# Patient Record
Sex: FEMALE | Race: WHITE | NOT HISPANIC OR LATINO | Employment: FULL TIME | URBAN - METROPOLITAN AREA
[De-identification: names, ages, dates, MRNs, and addresses within clinical notes are randomized per-mention and may not be internally consistent; named-entity substitution may affect disease eponyms.]

---

## 2017-01-03 ENCOUNTER — TELEPHONE (OUTPATIENT)
Dept: CARDIOLOGY | Facility: CLINIC | Age: 62
End: 2017-01-03

## 2017-01-03 NOTE — TELEPHONE ENCOUNTER
Informed patient of results of holter monitor.  Advised patient to call our office if she continues to have symptoms.  She voiced understanding.

## 2017-01-03 NOTE — TELEPHONE ENCOUNTER
----- Message from Nila Sheehan MD sent at 12/30/2016  8:51 PM CST -----  Has few skipped beats and short run of Pat if symptoms continue will make a trial low ose b blocker

## 2017-01-04 ENCOUNTER — HOSPITAL ENCOUNTER (EMERGENCY)
Facility: HOSPITAL | Age: 62
Discharge: HOME | End: 2017-01-04
Payer: COMMERCIAL

## 2017-01-04 VITALS
BODY MASS INDEX: 34.93 KG/M2 | HEIGHT: 61 IN | WEIGHT: 185 LBS | SYSTOLIC BLOOD PRESSURE: 153 MMHG | OXYGEN SATURATION: 96 % | DIASTOLIC BLOOD PRESSURE: 89 MMHG | TEMPERATURE: 98 F | RESPIRATION RATE: 20 BRPM | HEART RATE: 94 BPM

## 2017-01-04 DIAGNOSIS — S39.012A LUMBAR STRAIN, INITIAL ENCOUNTER: ICD-10-CM

## 2017-01-04 DIAGNOSIS — S80.10XA CONTUSION OF LOWER LEG, UNSPECIFIED LATERALITY, INITIAL ENCOUNTER: ICD-10-CM

## 2017-01-04 DIAGNOSIS — S16.1XXA CERVICAL STRAIN, ACUTE, INITIAL ENCOUNTER: ICD-10-CM
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
## 2017-01-04 DIAGNOSIS — S62.112A: Primary | ICD-10-CM

## 2017-01-04 PROCEDURE — 29125 APPL SHORT ARM SPLINT STATIC: CPT | Mod: LT

## 2017-01-04 PROCEDURE — 99284 EMERGENCY DEPT VISIT MOD MDM: CPT | Mod: 25

## 2017-01-04 PROCEDURE — 25000003 PHARM REV CODE 250: Performed by: PHYSICIAN ASSISTANT

## 2017-01-04 RX ORDER — HYDROCODONE BITARTRATE AND ACETAMINOPHEN 7.5; 325 MG/1; MG/1
1 TABLET ORAL ONCE
Status: COMPLETED | OUTPATIENT
Start: 2017-01-04 | End: 2017-01-04

## 2017-01-04 RX ADMIN — HYDROCODONE BITARTRATE AND ACETAMINOPHEN 1 TABLET: 7.5; 325 TABLET ORAL at 08:01
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
## 2017-01-06 ENCOUNTER — TELEPHONE (OUTPATIENT)
Dept: INTERNAL MEDICINE | Facility: CLINIC | Age: 62
End: 2017-01-06

## 2017-01-06 ENCOUNTER — OFFICE VISIT (OUTPATIENT)
Dept: INTERNAL MEDICINE | Facility: CLINIC | Age: 62
End: 2017-01-06
Payer: COMMERCIAL

## 2017-01-06 VITALS
BODY MASS INDEX: 34.92 KG/M2 | DIASTOLIC BLOOD PRESSURE: 64 MMHG | TEMPERATURE: 97 F | HEIGHT: 61 IN | WEIGHT: 184.94 LBS | SYSTOLIC BLOOD PRESSURE: 120 MMHG

## 2017-01-06 DIAGNOSIS — K14.8 TONGUE BITING: ICD-10-CM

## 2017-01-06 DIAGNOSIS — S63.502D LEFT WRIST SPRAIN, SUBSEQUENT ENCOUNTER: ICD-10-CM

## 2017-01-06 DIAGNOSIS — T07.XXXA MULTIPLE CONTUSIONS: ICD-10-CM

## 2017-01-06 DIAGNOSIS — S09.90XD HEAD TRAUMA, SUBSEQUENT ENCOUNTER: Primary | ICD-10-CM

## 2017-01-06 PROCEDURE — 99999 PR PBB SHADOW E&M-EST. PATIENT-LVL III: CPT | Mod: PBBFAC,,, | Performed by: PHYSICIAN ASSISTANT

## 2017-01-06 PROCEDURE — 99213 OFFICE O/P EST LOW 20 MIN: CPT | Mod: S$GLB,,, | Performed by: PHYSICIAN ASSISTANT

## 2017-01-06 PROCEDURE — 1159F MED LIST DOCD IN RCRD: CPT | Mod: S$GLB,,, | Performed by: PHYSICIAN ASSISTANT
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
## 2017-01-09 DIAGNOSIS — M25.532 LEFT WRIST PAIN: Primary | ICD-10-CM

## 2017-01-10 ENCOUNTER — OFFICE VISIT (OUTPATIENT)
Dept: ORTHOPEDICS | Facility: CLINIC | Age: 62
End: 2017-01-10
Payer: COMMERCIAL

## 2017-01-10 ENCOUNTER — HOSPITAL ENCOUNTER (OUTPATIENT)
Dept: RADIOLOGY | Facility: HOSPITAL | Age: 62
Discharge: HOME | End: 2017-01-10
Attending: ORTHOPAEDIC SURGERY
Payer: COMMERCIAL

## 2017-01-10 VITALS
SYSTOLIC BLOOD PRESSURE: 121 MMHG | DIASTOLIC BLOOD PRESSURE: 68 MMHG | WEIGHT: 185.19 LBS | HEART RATE: 76 BPM | BODY MASS INDEX: 34.96 KG/M2 | HEIGHT: 61 IN

## 2017-01-10 DIAGNOSIS — S62.102A LEFT WRIST FRACTURE, CLOSED, INITIAL ENCOUNTER: Primary | ICD-10-CM

## 2017-01-10 DIAGNOSIS — S62.102A AVULSION FRACTURE OF LEFT WRIST: Primary | ICD-10-CM

## 2017-01-10 DIAGNOSIS — M25.532 LEFT WRIST PAIN: ICD-10-CM

## 2017-01-10 PROCEDURE — 99999 PR PBB SHADOW E&M-EST. PATIENT-LVL III: CPT | Mod: PBBFAC,,, | Performed by: PHYSICIAN ASSISTANT

## 2017-01-10 PROCEDURE — 73110 X-RAY EXAM OF WRIST: CPT | Mod: 26,LT,, | Performed by: RADIOLOGY

## 2017-01-10 PROCEDURE — 1159F MED LIST DOCD IN RCRD: CPT | Mod: S$GLB,,, | Performed by: PHYSICIAN ASSISTANT

## 2017-01-10 PROCEDURE — 99203 OFFICE O/P NEW LOW 30 MIN: CPT | Mod: 25,S$GLB,, | Performed by: PHYSICIAN ASSISTANT

## 2017-01-10 PROCEDURE — 29085 APPL CAST HAND&LWR FOREARM: CPT | Mod: LT,S$GLB,, | Performed by: PHYSICIAN ASSISTANT

## 2017-01-10 PROCEDURE — 73110 X-RAY EXAM OF WRIST: CPT | Mod: TC,PO,LT

## 2017-01-10 RX ORDER — MELOXICAM 15 MG/1
15 TABLET ORAL DAILY
Qty: 30 TABLET | Refills: 0 | Status: SHIPPED | OUTPATIENT
Start: 2017-01-10 | End: 2017-01-31 | Stop reason: SDUPTHER
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
## 2017-01-17 DIAGNOSIS — R00.2 PALPITATIONS: Primary | ICD-10-CM

## 2017-01-17 RX ORDER — METOPROLOL TARTRATE 25 MG/1
25 TABLET, FILM COATED ORAL 2 TIMES DAILY
Qty: 60 TABLET | Refills: 5 | Status: SHIPPED | OUTPATIENT
Start: 2017-01-17 | End: 2017-12-06 | Stop reason: SDUPTHER

## 2017-01-17 NOTE — TELEPHONE ENCOUNTER
Patient called to inform us that she is still experiencing palpitations, SOB, along with her left foot and ankle swelling.  She wants to know that since she is continuing to experience symptoms, as discussed with her holter monitor results, if you recommend she start on a low dose beta blocker.  Please advise.

## 2017-01-17 NOTE — TELEPHONE ENCOUNTER
----- Message from Mihaela Martinez sent at 1/17/2017  3:33 PM CST -----  Pt at [phone]//hospitals is calling to ask for you to call in a Beta Blocker med//uses//[facility] in [place] on [address]//please call to discuss//belinda/stephanie

## 2017-01-31 ENCOUNTER — HOSPITAL ENCOUNTER (OUTPATIENT)
Dept: RADIOLOGY | Facility: HOSPITAL | Age: 62
Discharge: HOME | End: 2017-01-31
Attending: ORTHOPAEDIC SURGERY
Payer: COMMERCIAL

## 2017-01-31 ENCOUNTER — HOSPITAL ENCOUNTER (OUTPATIENT)
Dept: RADIOLOGY | Facility: HOSPITAL | Age: 62
Discharge: HOME | End: 2017-01-31
Attending: NURSE PRACTITIONER
Payer: COMMERCIAL

## 2017-01-31 ENCOUNTER — OFFICE VISIT (OUTPATIENT)
Dept: ORTHOPEDICS | Facility: CLINIC | Age: 62
End: 2017-01-31
Payer: COMMERCIAL

## 2017-01-31 ENCOUNTER — OFFICE VISIT (OUTPATIENT)
Dept: INTERNAL MEDICINE | Facility: CLINIC | Age: 62
End: 2017-01-31
Payer: COMMERCIAL

## 2017-01-31 VITALS
BODY MASS INDEX: 34.96 KG/M2 | WEIGHT: 185.19 LBS | DIASTOLIC BLOOD PRESSURE: 82 MMHG | HEART RATE: 103 BPM | HEIGHT: 61 IN | SYSTOLIC BLOOD PRESSURE: 142 MMHG

## 2017-01-31 VITALS
BODY MASS INDEX: 34.66 KG/M2 | TEMPERATURE: 101 F | DIASTOLIC BLOOD PRESSURE: 70 MMHG | SYSTOLIC BLOOD PRESSURE: 140 MMHG | WEIGHT: 183.44 LBS

## 2017-01-31 DIAGNOSIS — R68.89 FLU-LIKE SYMPTOMS: Primary | ICD-10-CM

## 2017-01-31 DIAGNOSIS — R09.89 CHEST CONGESTION: ICD-10-CM

## 2017-01-31 DIAGNOSIS — S62.102A AVULSION FRACTURE OF LEFT WRIST: ICD-10-CM

## 2017-01-31 DIAGNOSIS — S62.102A LEFT WRIST FRACTURE, CLOSED, INITIAL ENCOUNTER: ICD-10-CM

## 2017-01-31 DIAGNOSIS — R05.9 COUGH: ICD-10-CM

## 2017-01-31 LAB
FLUAV AG SPEC QL IA: NEGATIVE
FLUBV AG SPEC QL IA: NEGATIVE
SPECIMEN SOURCE: NORMAL

## 2017-01-31 PROCEDURE — 71020 XR CHEST PA AND LATERAL: CPT | Mod: TC,PO

## 2017-01-31 PROCEDURE — 99999 PR PBB SHADOW E&M-EST. PATIENT-LVL III: CPT | Mod: PBBFAC,,, | Performed by: NURSE PRACTITIONER

## 2017-01-31 PROCEDURE — 87400 INFLUENZA A/B EACH AG IA: CPT | Mod: 59,PO

## 2017-01-31 PROCEDURE — 1159F MED LIST DOCD IN RCRD: CPT | Mod: S$GLB,,, | Performed by: PHYSICIAN ASSISTANT

## 2017-01-31 PROCEDURE — 96372 THER/PROPH/DIAG INJ SC/IM: CPT | Mod: 59,S$GLB,, | Performed by: NURSE PRACTITIONER

## 2017-01-31 PROCEDURE — 94640 AIRWAY INHALATION TREATMENT: CPT | Mod: S$GLB,,, | Performed by: NURSE PRACTITIONER

## 2017-01-31 PROCEDURE — 99214 OFFICE O/P EST MOD 30 MIN: CPT | Mod: 25,S$GLB,, | Performed by: PHYSICIAN ASSISTANT

## 2017-01-31 PROCEDURE — 99999 PR PBB SHADOW E&M-EST. PATIENT-LVL IV: CPT | Mod: PBBFAC,,, | Performed by: PHYSICIAN ASSISTANT

## 2017-01-31 PROCEDURE — 99213 OFFICE O/P EST LOW 20 MIN: CPT | Mod: 25,S$GLB,, | Performed by: NURSE PRACTITIONER

## 2017-01-31 PROCEDURE — 1159F MED LIST DOCD IN RCRD: CPT | Mod: S$GLB,,, | Performed by: NURSE PRACTITIONER

## 2017-01-31 PROCEDURE — 71020 XR CHEST PA AND LATERAL: CPT | Mod: 26,,, | Performed by: RADIOLOGY

## 2017-01-31 PROCEDURE — 73110 X-RAY EXAM OF WRIST: CPT | Mod: 26,LT,, | Performed by: RADIOLOGY

## 2017-01-31 PROCEDURE — 73110 X-RAY EXAM OF WRIST: CPT | Mod: TC,PO,LT

## 2017-01-31 RX ORDER — DOXYCYCLINE 100 MG/1
100 CAPSULE ORAL EVERY 12 HOURS
Qty: 20 CAPSULE | Refills: 0 | Status: SHIPPED | OUTPATIENT
Start: 2017-01-31 | End: 2017-02-10

## 2017-01-31 RX ORDER — BENZONATATE 100 MG/1
100 CAPSULE ORAL 3 TIMES DAILY PRN
Qty: 15 CAPSULE | Refills: 0 | Status: SHIPPED | OUTPATIENT
Start: 2017-01-31 | End: 2017-06-21

## 2017-01-31 RX ORDER — IPRATROPIUM BROMIDE AND ALBUTEROL SULFATE 2.5; .5 MG/3ML; MG/3ML
3 SOLUTION RESPIRATORY (INHALATION)
Status: COMPLETED | OUTPATIENT
Start: 2017-01-31 | End: 2017-01-31

## 2017-01-31 RX ORDER — MELOXICAM 15 MG/1
15 TABLET ORAL DAILY
Qty: 30 TABLET | Refills: 0 | Status: SHIPPED | OUTPATIENT
Start: 2017-01-31 | End: 2017-06-21

## 2017-01-31 RX ORDER — ALBUTEROL SULFATE 90 UG/1
1-2 AEROSOL, METERED RESPIRATORY (INHALATION) EVERY 6 HOURS PRN
Qty: 1 INHALER | Refills: 0 | Status: SHIPPED | OUTPATIENT
Start: 2017-01-31 | End: 2020-05-06

## 2017-01-31 RX ORDER — METHYLPREDNISOLONE ACETATE 40 MG/ML
40 INJECTION, SUSPENSION INTRA-ARTICULAR; INTRALESIONAL; INTRAMUSCULAR; SOFT TISSUE
Status: COMPLETED | OUTPATIENT
Start: 2017-01-31 | End: 2017-01-31

## 2017-01-31 RX ADMIN — METHYLPREDNISOLONE ACETATE 40 MG: 40 INJECTION, SUSPENSION INTRA-ARTICULAR; INTRALESIONAL; INTRAMUSCULAR; SOFT TISSUE at 11:01

## 2017-01-31 RX ADMIN — IPRATROPIUM BROMIDE AND ALBUTEROL SULFATE 3 ML: 2.5; .5 SOLUTION RESPIRATORY (INHALATION) at 11:01

## 2017-01-31 NOTE — MR AVS SNAPSHOT
[facility]  [address] [place] [address]  Phone: [phone]  Fax: [phone]                  Mouna Chandra   2017 11:00 AM   Office Visit    Description:  Female : 1955   Provider:  AMIE Mota   Department:  [facility] - Internal Medicine           Reason for Visit     Cough     Diarrhea     Nasal Congestion     Fever     Sore Throat           Diagnoses this Visit        Comments    Flu-like symptoms    -  Primary     Cough         Chest congestion                To Do List           Future Appointments        Provider Department Dept Phone    2017 11:30 AM [facility] [phone]    2017 1:15 PM [facility] [phone]    2017 1:45 PM Dian Masters PA-C [facility] [phone]    3/20/2017 4:00 PM Ofe Velez MD [facility] [phone]    2018 8:30 AM GEORGIA Tobin MD [facility] [phone]      Goals (5 Years of Data)     None      Follow-Up and Disposition     Return if symptoms worsen or fail to improve.      [facility]     [facility] - 24/7 Assistance  Registered nurses in the [facility] provide clinical advisement, health education, appointment booking, and other advisory services.  Call for this free service at [phone].             Medications           Message regarding Medications     Verify the changes and/or additions to your medication regime listed below are the same as discussed with your clinician today.  If any of these changes or additions are incorrect, please notify your healthcare provider.        These medications were administered today        Dose Freq    albuterol-ipratropium 2.5mg-0.5mg/3mL nebulizer solution 3 mL 3 mL Clinic/HOD 1 time    Sig: Take 3 mLs by nebulization one time.    Class: Normal    Route: Nebulization    methylPREDNISolone acetate  injection 40 mg 40 mg Clinic/HOD 1 time    Sig: Inject 1 mL (40 mg total) into the muscle one time.    Class: Normal    Route: Intramuscular           Verify that the below list of medications is an accurate representation of the medications you are currently taking.  If none reported, the list may be blank. If incorrect, please contact your healthcare provider. Carry this list with you in case of emergency.           Current Medications     alprazolam (XANAX) 0.25 MG tablet Take 1 tablet (0.25 mg total) by mouth 3 (three) times daily as needed for Anxiety.    buPROPion (WELLBUTRIN) 100 MG tablet TAKE 1 TABLET BY MOUTH EVERY MORNING    ergocalciferol (ERGOCALCIFEROL) 50,000 unit Cap Take 1 capsule (50,000 Units total) by mouth every 7 days.    esomeprazole magnesium (NEXIUM 24HR) 22.3 mg CpDR Take 1 tablet by mouth once daily.    estradiol 0.05 mg/24 hr td ptsw (VIVELLE-DOT) 0.05 mg/24 hr Place 1 patch onto the skin twice a week.     fluticasone (FLONASE) 50 mcg/actuation nasal spray TWO SPRAYS EACH NOSTRIL ONCE DAILY.    ibuprofen (ADVIL,MOTRIN) 800 MG tablet Take 800 mg by mouth every 6 (six) hours as needed for Pain.    lovastatin (MEVACOR) 40 MG tablet Take 1 tablet (40 mg total) by mouth every evening.    meloxicam (MOBIC) 15 MG tablet Take 1 tablet (15 mg total) by mouth once daily. Take with food    methocarbamol (ROBAXIN) 750 MG Tab Take 1 tablet (750 mg total) by mouth 4 (four) times daily.    metoprolol tartrate (LOPRESSOR) 25 MG tablet Take 1 tablet (25 mg total) by mouth 2 (two) times daily.    paroxetine (PAXIL) 40 MG tablet TAKE 1 TABLET BY MOUTH EVERY MORNING    trazodone (DESYREL) 50 MG tablet Take 1.5 tablets (75 mg total) by mouth every evening.           Clinical Reference Information           Vital Signs - Last Recorded  Most recent update: 1/31/2017 10:38 AM by Ave Burkett LPN    BP Temp Wt BMI       (!) 140/70 (!) 101.4 °F (38.6 °C) (Tympanic) 83.2 kg (183 lb 6.8 oz) 34.66 kg/m2        Blood Pressure          Most Recent Value    BP  (!)  140/70      Allergies as of 1/31/2017     Adhesive    Codeine    Iodine Containing Multivitamin    Lanolin    Latex, Natural Rubber    Neosporin [Benzalkonium Chloride]    Shellfish Containing Products    Neosporin (Neomycin-polymyx)      Immunizations Administered on Date of Encounter - 1/31/2017     None      Orders Placed During Today's Visit     Future Labs/Procedures Expected by Expires    Influenza antigen Nasal Swab  1/31/2017 4/1/2018    X-Ray Chest PA And Lateral  1/31/2017 1/31/2018

## 2017-01-31 NOTE — PROGRESS NOTES
Patient ID: Mouna Chandra is a 62 y.o. female.    Chief Complaint: Pain of the Left Wrist      HPI: Mouna Chandra  is a 62 y.o. female who c/o Pain of the Left Wrist   for duration of about 1 month now.  She comes in today for routine evaluation and follow-up.  At last office visit, she had some tenderness along the triquetrum with questionable avulsion injury to this bone.  I put her into a short arm cast for immobilization.  She comes in today for routine follow-up.  She states that her pain is doing much better than it was at her previous office visit.  It is down to 3 out of 10 in severity.  She has aching and intermittent pain.  Alleviating factors included the cast immobilization.  Aggravating factors include particular movements.  She denies associated numbness, tingling, and swelling.        Objective:        General    Nursing note and vitals reviewed.  Constitutional: She is oriented to person, place, and time. She appears well-developed and well-nourished.   HENT:   Head: Normocephalic and atraumatic.   Eyes: EOM are normal.   Cardiovascular: Normal rate and regular rhythm.    Pulmonary/Chest: Effort normal.   Abdominal: Soft.   Neurological: She is alert and oriented to person, place, and time.   Psychiatric: She has a normal mood and affect. Her behavior is normal.         Left Hand/Wrist Exam     Comments:  She has well-preserved range of motion of the left wrist.  He is able to make a full fist.  She does have decreased  strength on the left side in comparison with the right.  She has good range of motion of all of her digits.  She has no tenderness to palpation over the second metacarpal base.  She still has some tenderness over the triquetrum.  This is much improved since last office visit.  There is no associated swelling or erythema.  Compartments are soft and capillary refill less than 2 seconds.  She has a 2+ radial pulse.  Sensation intact to light  touch.                  Xray:   Left wrist from today images and report were reviewed.  The calcification noted along the triquetrum is stable in appearance to x-rays from previous office visit.  Additionally, the radiologist appreciated some radiolucency within the second metacarpal base.  Clinically, the patient does not have any in this area, so I do not feel that this is significant for nondisplaced fracture.      Assessment:       Encounter Diagnosis   Name Primary?    Avulsion fracture of left wrist           Plan:       Mouna was seen today for pain.    Diagnoses and all orders for this visit:    Avulsion fracture of left wrist  -     Ambulatory Referral to Physical/Occupational Therapy  -     meloxicam (MOBIC) 15 MG tablet; Take 1 tablet (15 mg total) by mouth once daily. Take with food    Ms. Freire comes in today for follow-up of an avulsion fracture to the left wrist.  At this time, she has been immobilized in a cast for over 3 weeks.  I recommend having her come out of the cast and back into her wrist splint.  She should continue to use that while working.  She will also continue with her anti-inflammatory in the form of meloxicam.  She needs another prescription for that so I have written for her.  I would also like to get her started in some occupational therapy to work on regaining her strength and motion.  I will see her back in the office in 1 month.  If she is not made significant improvement, we may consider an trigger point injection in the wrist.  Patient verbalizes understanding and agrees with the above plan.  Return in about 1 month (around 2/28/2017).          The patient understands, chooses and consents to this plan and accepts all   the risks which include but are not limited to the risks mentioned above.     Disclaimer: This note was prepared using a voice recognition system and is likely to have sound alike errors within the text.

## 2017-01-31 NOTE — PROGRESS NOTES
Subjective:   Patient ID: Mouna Chandra is a 62 y.o. female.    Chief Complaint: cough, fever, body aches  HPI Comments: Pt. Presents today for c/o cough, body aches, and chills. Symptoms started suddenly yesterday. Associated symptoms of nasal congestion, SOB, wheezing fever, nausea, and diarrhea. No blood in stool. No vomiting. No abd pain. Had flu shot.     Review of Systems   Constitutional: Positive for chills, fatigue and fever.   HENT: Positive for congestion, rhinorrhea and sore throat (scratchy throat). Negative for ear discharge, ear pain, facial swelling, postnasal drip, sinus pressure, trouble swallowing and voice change.    Eyes: Negative for itching and visual disturbance.   Respiratory: Positive for cough, chest tightness, shortness of breath and wheezing.    Cardiovascular: Positive for chest pain.   Gastrointestinal: Positive for diarrhea and nausea. Negative for abdominal pain and vomiting.   Genitourinary: Negative for difficulty urinating.   Musculoskeletal: Positive for myalgias (generalized body aches).   Skin: Negative for rash.   Neurological: Positive for headaches. Negative for dizziness, weakness and light-headedness.       Objective:      Physical Exam   Constitutional: She is oriented to person, place, and time. She appears well-developed and well-nourished. No distress.   HENT:   Head: Normocephalic and atraumatic.   Right Ear: Tympanic membrane, external ear and ear canal normal.   Left Ear: Tympanic membrane, external ear and ear canal normal.   Nose: Mucosal edema present. Right sinus exhibits no maxillary sinus tenderness and no frontal sinus tenderness. Left sinus exhibits frontal sinus tenderness. Left sinus exhibits no maxillary sinus tenderness.   Mouth/Throat: Uvula is midline and mucous membranes are normal. No oropharyngeal exudate, posterior oropharyngeal edema or posterior oropharyngeal erythema.   Eyes: Lids are normal.   Cardiovascular: Normal rate, regular  rhythm and normal heart sounds.    Pulmonary/Chest: Effort normal. No accessory muscle usage. No apnea, no tachypnea and no bradypnea. No respiratory distress. She has decreased breath sounds. She has wheezes. She has no rhonchi. She has no rales. She exhibits tenderness.       Musculoskeletal: Normal range of motion.   Lymphadenopathy:     She has no cervical adenopathy.   Neurological: She is alert and oriented to person, place, and time.   Skin: Skin is warm and dry. She is not diaphoretic.   Psychiatric: She has a normal mood and affect. Her behavior is normal. Judgment and thought content normal.   Nursing note and vitals reviewed.      Assessment:       1. Flu-like symptoms    2. Cough    3. Chest congestion        Plan:   Flu-like symptoms  -     Influenza antigen Nasal Swab; Future; Expected date: 1/31/17  Cough with wheezing - 02 sats are 95%  Chest congestion  -     X-Ray Chest PA And Lateral; Future; Expected date: 1/31/17  Other orders  -     albuterol-ipratropium 2.5mg-0.5mg/3mL nebulizer solution 3 mL; Take 3 mLs by nebulization one time.  -     methylPREDNISolone acetate injection 40 mg; Inject 1 mL (40 mg total) into the muscle one time.    Will f/u with above results today w/ further recs.     To ER if symptoms worsen    Addendum: Pt notified that flu test was neg and CXR did not reveal any evidence of pneumonia. RX for doxy BID for 10 days, proair, and tessalon sent prn cough to pt's pharmacy. Instructed to f/u in 2 days for re-check or if symptoms worsen to go to ER - she verbalizes an understanding.     Return if symptoms worsen or fail to improve.

## 2017-01-31 NOTE — MR AVS SNAPSHOT
[facility]  [address] [place] [address]  Phone: [phone]  Fax: [phone]                  Mouna Chandra   2017 1:45 PM   Office Visit    Description:  Female : 1955   Provider:  Dian Masters PA-C   Department:  [facility] - Orth[facility]           Reason for Visit     Left Wrist - Pain           Diagnoses this Visit        Comments    Avulsion fracture of left wrist                To Do List           Future Appointments        Provider Department Dept Phone    2017 1:45 PM Dian Masters PA-C [facility] [phone]    3/20/2017 4:00 PM Ofe Velez MD [facility] [phone]    2018 8:30 AM GEORGIA Tobin MD [facility] [phone]      Goals (5 Years of Data)     None       These Medications        Disp Refills Start End    meloxicam (MOBIC) 15 MG tablet 30 tablet 0 2017     Take 1 tablet (15 mg total) by mouth once daily. Take with food - Oral    Pharmacy: [facility] [address] [place] [address] 16 AT [facility] [place] [address] Ph #: [phone]         [facility]     [facility] Care Line -  Assistance  Registered nurses in the [facility] provide clinical advisement, health education, appointment booking, and other advisory services.  Call for this free service at [phone].             Medications           Message regarding Medications     Verify the changes and/or additions to your medication regime listed below are the same as discussed with your clinician today.  If any of these changes or additions are incorrect, please notify your healthcare provider.             Verify that the below list of medications is an accurate representation of the medications you are currently taking.  If none reported, the list may be blank. If incorrect, please contact your healthcare provider. Carry this list with you in case of  "emergency.           Current Medications     alprazolam (XANAX) 0.25 MG tablet Take 1 tablet (0.25 mg total) by mouth 3 (three) times daily as needed for Anxiety.    buPROPion (WELLBUTRIN) 100 MG tablet TAKE 1 TABLET BY MOUTH EVERY MORNING    ergocalciferol (ERGOCALCIFEROL) 50,000 unit Cap Take 1 capsule (50,000 Units total) by mouth every 7 days.    esomeprazole magnesium (NEXIUM 24HR) 22.3 mg CpDR Take 1 tablet by mouth once daily.    estradiol 0.05 mg/24 hr td ptsw (VIVELLE-DOT) 0.05 mg/24 hr Place 1 patch onto the skin twice a week.     fluticasone (FLONASE) 50 mcg/actuation nasal spray TWO SPRAYS EACH NOSTRIL ONCE DAILY.    ibuprofen (ADVIL,MOTRIN) 800 MG tablet Take 800 mg by mouth every 6 (six) hours as needed for Pain.    lovastatin (MEVACOR) 40 MG tablet Take 1 tablet (40 mg total) by mouth every evening.    meloxicam (MOBIC) 15 MG tablet Take 1 tablet (15 mg total) by mouth once daily. Take with food    methocarbamol (ROBAXIN) 750 MG Tab Take 1 tablet (750 mg total) by mouth 4 (four) times daily.    metoprolol tartrate (LOPRESSOR) 25 MG tablet Take 1 tablet (25 mg total) by mouth 2 (two) times daily.    paroxetine (PAXIL) 40 MG tablet TAKE 1 TABLET BY MOUTH EVERY MORNING    trazodone (DESYREL) 50 MG tablet Take 1.5 tablets (75 mg total) by mouth every evening.    albuterol 90 mcg/actuation inhaler Inhale 1-2 puffs into the lungs every 6 (six) hours as needed for Wheezing.    benzonatate (TESSALON) 100 MG capsule Take 1 capsule (100 mg total) by mouth 3 (three) times daily as needed for Cough.    doxycycline (MONODOX) 100 MG capsule Take 1 capsule (100 mg total) by mouth every 12 (twelve) hours.           Clinical Reference Information           Vital Signs - Last Recorded  Most recent update: 1/31/2017  1:53 PM by Nohemy Lakhani    BP Pulse Ht Wt BMI    (!) 142/82 (BP Location: Right arm, Patient Position: Sitting, BP Method: Automatic) 103 5' 1" (1.549 m) 84 kg (185 lb 3 oz) 34.99 kg/m2      Blood " Pressure          Most Recent Value    BP  (!)  142/82      Allergies as of 1/31/2017     Adhesive    Codeine    Iodine Containing Multivitamin    Lanolin    Latex, Natural Rubber    Neosporin [Benzalkonium Chloride]    Shellfish Containing Products    Neosporin (Neomycin-polymyx)      Immunizations Administered on Date of Encounter - 1/31/2017     None      Orders Placed During Today's Visit      Normal Orders This Visit    Ambulatory Referral to Physical/Occupational Therapy

## 2017-02-02 ENCOUNTER — OFFICE VISIT (OUTPATIENT)
Dept: INTERNAL MEDICINE | Facility: CLINIC | Age: 62
End: 2017-02-02
Payer: COMMERCIAL

## 2017-02-02 VITALS
TEMPERATURE: 97 F | SYSTOLIC BLOOD PRESSURE: 118 MMHG | WEIGHT: 183 LBS | DIASTOLIC BLOOD PRESSURE: 68 MMHG | HEIGHT: 61 IN | BODY MASS INDEX: 34.55 KG/M2 | HEART RATE: 69 BPM

## 2017-02-02 DIAGNOSIS — J40 BRONCHITIS: Primary | ICD-10-CM

## 2017-02-02 PROCEDURE — 99999 PR PBB SHADOW E&M-EST. PATIENT-LVL III: CPT | Mod: PBBFAC,,, | Performed by: NURSE PRACTITIONER

## 2017-02-02 PROCEDURE — 99212 OFFICE O/P EST SF 10 MIN: CPT | Mod: S$GLB,,, | Performed by: NURSE PRACTITIONER

## 2017-02-02 NOTE — MR AVS SNAPSHOT
[facility]  [address] [place] [address]  Phone: [phone]  Fax: [phone]                  Mouna Chandra   2017 3:00 PM   Office Visit    Description:  Female : 1955   Provider:  AMIE Mota   Department:  [facility] - Internal Medicine           Reason for Visit     Follow-up                To Do List           Future Appointments        Provider Department Dept Phone    2017 7:30 AM Kranthi Perrin PT [facility] [phone]    2017 1:45 PM Dian Masters PA-C [facility] [phone]    3/20/2017 4:00 PM Ofe Velez MD [facility] Internal Medicine [phone]    2018 8:30 AM GEORGIA Tobin MD [facility] Ophthalmology [phone]      Goals (5 Years of Data)     None      Follow-Up and Disposition     Return if symptoms worsen or fail to improve.      [facility] On Call     [facility] On Call Nurse Care Line -  Assistance  Registered nurses in the [facility] On Call Center provide clinical advisement, health education, appointment booking, and other advisory services.  Call for this free service at [phone].             Medications           Message regarding Medications     Verify the changes and/or additions to your medication regime listed below are the same as discussed with your clinician today.  If any of these changes or additions are incorrect, please notify your healthcare provider.             Verify that the below list of medications is an accurate representation of the medications you are currently taking.  If none reported, the list may be blank. If incorrect, please contact your healthcare provider. Carry this list with you in case of emergency.           Current Medications     albuterol 90 mcg/actuation inhaler Inhale 1-2 puffs into the lungs every 6 (six) hours as needed for Wheezing.    alprazolam (XANAX) 0.25 MG tablet Take 1 tablet (0.25 mg total) by mouth 3 (three) times  "daily as needed for Anxiety.    benzonatate (TESSALON) 100 MG capsule Take 1 capsule (100 mg total) by mouth 3 (three) times daily as needed for Cough.    buPROPion (WELLBUTRIN) 100 MG tablet TAKE 1 TABLET BY MOUTH EVERY MORNING    doxycycline (MONODOX) 100 MG capsule Take 1 capsule (100 mg total) by mouth every 12 (twelve) hours.    ergocalciferol (ERGOCALCIFEROL) 50,000 unit Cap Take 1 capsule (50,000 Units total) by mouth every 7 days.    esomeprazole magnesium (NEXIUM 24HR) 22.3 mg CpDR Take 1 tablet by mouth once daily.    estradiol 0.05 mg/24 hr td ptsw (VIVELLE-DOT) 0.05 mg/24 hr Place 1 patch onto the skin twice a week.     fluticasone (FLONASE) 50 mcg/actuation nasal spray TWO SPRAYS EACH NOSTRIL ONCE DAILY.    ibuprofen (ADVIL,MOTRIN) 800 MG tablet Take 800 mg by mouth every 6 (six) hours as needed for Pain.    lovastatin (MEVACOR) 40 MG tablet Take 1 tablet (40 mg total) by mouth every evening.    meloxicam (MOBIC) 15 MG tablet Take 1 tablet (15 mg total) by mouth once daily. Take with food    methocarbamol (ROBAXIN) 750 MG Tab Take 1 tablet (750 mg total) by mouth 4 (four) times daily.    metoprolol tartrate (LOPRESSOR) 25 MG tablet Take 1 tablet (25 mg total) by mouth 2 (two) times daily.    paroxetine (PAXIL) 40 MG tablet TAKE 1 TABLET BY MOUTH EVERY MORNING    trazodone (DESYREL) 50 MG tablet Take 1.5 tablets (75 mg total) by mouth every evening.           Clinical Reference Information           Your Vitals Were     BP Pulse Temp Height Weight BMI    118/68 69 97.1 °F (36.2 °C) (Tympanic) 5' 1" (1.549 m) 83 kg (182 lb 15.7 oz) 34.57 kg/m2      Blood Pressure          Most Recent Value    BP  118/68      Allergies as of 2/2/2017     Adhesive    Codeine    Iodine Containing Multivitamin    Lanolin    Latex, Natural Rubber    Neosporin [Benzalkonium Chloride]    Shellfish Containing Products    Neosporin (Neomycin-polymyx)      Immunizations Administered on Date of Encounter - 2/2/2017     None    "   Language Assistance Services     ATTENTION: Language assistance services are available, free of charge. Please call [phone].      ATENCIÓN: Si habla romel, tiene a smith disposición servicios gratuitos de asistencia lingüística. Llame al [phone].     CHÚ Ý: N?u b?n nói Ti?ng Vi?t, có các d?ch v? h? tr? ngôn ng? mi?n phí dành cho b?n. G?i s? [phone].         [facility] complies with applicable Federal civil rights laws and does not discriminate on the basis of race, color, national origin, age, disability, or sex.

## 2017-02-02 NOTE — PROGRESS NOTES
"Subjective:   Patient ID: Mouna Chandra is a 62 y.o. female.    Chief Complaint: Follow-up    HPI Comments: Pt. Presents today for f/u from her prior visit with me on 1/31/17. At that visit she presented w/ cough, body aches, nasal congestion, and chills.  Also had chest tightness, SOB, and fever. CXR was done with no evidence of pneumonia; flu test was done which was negative. Was tx w/ doxy, depomedrol shot in clinic, tessalon, and proair. Today she is presenting for a f/u on her symptoms. She states she is feeling much better. Still has a dry cough, but improving. No SOB. Has not had to use the proair. Highest temp got was "100.0" last night.     Review of Systems   Constitutional: Positive for fever (improving. had low grade temp last night of "100.0" ). Negative for chills and fatigue.   HENT: Positive for congestion (states is improving. ), postnasal drip, rhinorrhea (improving) and sinus pressure (improving). Negative for ear pain.    Respiratory: Positive for cough (reports dry and improving). Negative for chest tightness and wheezing.    Cardiovascular: Negative for chest pain.   Musculoskeletal: Negative for myalgias.   Neurological: Negative for dizziness, weakness, light-headedness and headaches.       Objective:      Physical Exam   Constitutional: She is oriented to person, place, and time. She appears well-developed and well-nourished. No distress.   HENT:   Head: Normocephalic and atraumatic.   Eyes: Lids are normal.   Cardiovascular: Normal rate, regular rhythm and normal heart sounds.    Pulmonary/Chest: Effort normal. No respiratory distress. She has wheezes in the right lower field. She has no rales. She exhibits no tenderness.   Lung sounds much improve than prior visit   Musculoskeletal: Normal range of motion.   Lymphadenopathy:     She has no cervical adenopathy.   Neurological: She is alert and oriented to person, place, and time.   Skin: Skin is warm and dry. She is not " "diaphoretic.   Psychiatric: She has a normal mood and affect. Her behavior is normal. Judgment and thought content normal.   Nursing note and vitals reviewed.      Assessment:       1. Bronchitis        Plan:   Bronchitis  - pt symptoms improving and she feels better. Fever has resolved other than low grade temp she had last night of "100.0". She denies any SOB today.   - Continue doxy and proAir as needed w/ the tessalon prn    Return if symptoms worsen or fail to improve.  "

## 2017-02-10 DIAGNOSIS — M51.9 LUMBAR DISC DISEASE: ICD-10-CM

## 2017-02-10 DIAGNOSIS — F41.9 ANXIETY: ICD-10-CM

## 2017-02-10 DIAGNOSIS — E55.9 VITAMIN D DEFICIENCY: ICD-10-CM

## 2017-02-10 DIAGNOSIS — F32.A ANXIETY AND DEPRESSION: ICD-10-CM

## 2017-02-10 DIAGNOSIS — F51.01 PRIMARY INSOMNIA: ICD-10-CM

## 2017-02-10 DIAGNOSIS — F41.9 ANXIETY AND DEPRESSION: ICD-10-CM

## 2017-02-10 DIAGNOSIS — E78.2 MIXED HYPERLIPIDEMIA: Chronic | ICD-10-CM

## 2017-02-10 RX ORDER — TRAZODONE HYDROCHLORIDE 50 MG/1
75 TABLET ORAL NIGHTLY
Qty: 45 TABLET | Refills: 11 | Status: SHIPPED | OUTPATIENT
Start: 2017-02-10 | End: 2018-03-26 | Stop reason: SDUPTHER

## 2017-02-10 RX ORDER — ERGOCALCIFEROL 1.25 MG/1
50000 CAPSULE ORAL
Qty: 24 CAPSULE | Refills: 3 | Status: SHIPPED | OUTPATIENT
Start: 2017-02-10 | End: 2018-05-02 | Stop reason: SDUPTHER

## 2017-02-10 RX ORDER — FLUTICASONE PROPIONATE 50 MCG
SPRAY, SUSPENSION (ML) NASAL
Qty: 16 G | Refills: 6 | Status: SHIPPED | OUTPATIENT
Start: 2017-02-10 | End: 2020-05-06 | Stop reason: SDUPTHER

## 2017-02-10 RX ORDER — ALPRAZOLAM 0.25 MG/1
0.25 TABLET ORAL 3 TIMES DAILY PRN
Qty: 60 TABLET | Refills: 1 | Status: SHIPPED | OUTPATIENT
Start: 2017-02-10 | End: 2020-05-06 | Stop reason: SDUPTHER

## 2017-02-10 RX ORDER — PAROXETINE HYDROCHLORIDE 40 MG/1
40 TABLET, FILM COATED ORAL EVERY MORNING
Qty: 30 TABLET | Refills: 11 | Status: SHIPPED | OUTPATIENT
Start: 2017-02-10 | End: 2018-03-26 | Stop reason: SDUPTHER

## 2017-02-10 RX ORDER — LOVASTATIN 40 MG/1
40 TABLET ORAL NIGHTLY
Qty: 90 TABLET | Refills: 3 | Status: SHIPPED | OUTPATIENT
Start: 2017-02-10 | End: 2018-02-26 | Stop reason: SDUPTHER

## 2017-02-10 RX ORDER — METHOCARBAMOL 750 MG/1
750 TABLET, FILM COATED ORAL 4 TIMES DAILY
Qty: 180 TABLET | Refills: 6 | Status: SHIPPED | OUTPATIENT
Start: 2017-02-10 | End: 2018-03-26 | Stop reason: SDUPTHER

## 2017-02-10 RX ORDER — BUPROPION HYDROCHLORIDE 100 MG/1
100 TABLET ORAL EVERY MORNING
Qty: 30 TABLET | Refills: 11 | Status: SHIPPED | OUTPATIENT
Start: 2017-02-10 | End: 2018-03-26 | Stop reason: SDUPTHER

## 2017-02-28 ENCOUNTER — TELEPHONE (OUTPATIENT)
Dept: ORTHOPEDICS | Facility: CLINIC | Age: 62
End: 2017-02-28

## 2017-02-28 NOTE — TELEPHONE ENCOUNTER
Spoke to pt she stated her hand is feeling much better and she is not having any pain. Pt stated she wants to cancel appointment. I verbalized I understood.

## 2017-03-08 ENCOUNTER — PATIENT OUTREACH (OUTPATIENT)
Dept: ADMINISTRATIVE | Facility: HOSPITAL | Age: 62
End: 2017-03-08
Payer: COMMERCIAL

## 2017-03-21 ENCOUNTER — OFFICE VISIT (OUTPATIENT)
Dept: INTERNAL MEDICINE | Facility: CLINIC | Age: 62
End: 2017-03-21
Payer: COMMERCIAL

## 2017-03-21 VITALS
HEIGHT: 61 IN | WEIGHT: 190.5 LBS | DIASTOLIC BLOOD PRESSURE: 74 MMHG | TEMPERATURE: 97 F | BODY MASS INDEX: 35.97 KG/M2 | OXYGEN SATURATION: 96 % | HEART RATE: 76 BPM | SYSTOLIC BLOOD PRESSURE: 136 MMHG

## 2017-03-21 DIAGNOSIS — Z29.9 PREVENTIVE MEASURE: ICD-10-CM

## 2017-03-21 DIAGNOSIS — F41.8 MIXED ANXIETY AND DEPRESSIVE DISORDER: ICD-10-CM

## 2017-03-21 DIAGNOSIS — E78.2 MIXED HYPERLIPIDEMIA: Primary | Chronic | ICD-10-CM

## 2017-03-21 DIAGNOSIS — R00.2 PALPITATIONS: ICD-10-CM

## 2017-03-21 DIAGNOSIS — K21.9 GASTROESOPHAGEAL REFLUX DISEASE WITHOUT ESOPHAGITIS: ICD-10-CM

## 2017-03-21 DIAGNOSIS — E55.9 VITAMIN D DEFICIENCY: ICD-10-CM

## 2017-03-21 DIAGNOSIS — R25.2 CRAMP OF BOTH LOWER EXTREMITIES: ICD-10-CM

## 2017-03-21 PROCEDURE — 90670 PCV13 VACCINE IM: CPT | Mod: S$GLB,,, | Performed by: INTERNAL MEDICINE

## 2017-03-21 PROCEDURE — 99999 PR PBB SHADOW E&M-EST. PATIENT-LVL IV: CPT | Mod: PBBFAC,,, | Performed by: INTERNAL MEDICINE

## 2017-03-21 PROCEDURE — 1160F RVW MEDS BY RX/DR IN RCRD: CPT | Mod: S$GLB,,, | Performed by: INTERNAL MEDICINE

## 2017-03-21 PROCEDURE — 99214 OFFICE O/P EST MOD 30 MIN: CPT | Mod: 25,S$GLB,, | Performed by: INTERNAL MEDICINE

## 2017-03-21 PROCEDURE — 90471 IMMUNIZATION ADMIN: CPT | Mod: S$GLB,,, | Performed by: INTERNAL MEDICINE
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
## 2017-03-21 NOTE — MR AVS SNAPSHOT
[facility] Internal [facility]  [address] [place] [address]  Phone: [phone]  Fax: [phone]                  Mouna Chandra   3/21/2017 3:00 PM   Office Visit    Description:  Female : 1955   Provider:  Ofe Velez MD   Department:  [facility] Internal Medicine           Reason for Visit     Follow-up           Diagnoses this Visit        Comments    Mixed hyperlipidemia    -  Primary     Vitamin D deficiency         Mixed anxiety and depressive disorder         Palpitations         Preventive measure         Gastroesophageal reflux disease without esophagitis         Cramp of both lower extremities                To Do List           Future Appointments        Provider Department Dept Phone    2017 3:20 PM Ofe Velez MD [facility] Internal Medicine [phone]    2018 8:30 AM GEORGIA Tobin MD [facility] Ophthalmology [phone]      Goals (5 Years of Data)     None      Follow-Up and Disposition     Return in about 3 months (around 2017).    Follow-up and Disposition History      [facility] On Call     [facility] On Call Nurse Care Line - 24/7 Assistance  Registered nurses in the [facility] On Call Center provide clinical advisement, health education, appointment booking, and other advisory services.  Call for this free service at [phone].             Medications           Message regarding Medications     Verify the changes and/or additions to your medication regime listed below are the same as discussed with your clinician today.  If any of these changes or additions are incorrect, please notify your healthcare provider.             Verify that the below list of medications is an accurate representation of the medications you are currently taking.  If none reported, the list may be blank. If incorrect, please contact your healthcare provider. Carry this list with you in case of emergency.           Current Medications     albuterol 90 mcg/actuation  "inhaler Inhale 1-2 puffs into the lungs every 6 (six) hours as needed for Wheezing.    alprazolam (XANAX) 0.25 MG tablet Take 1 tablet (0.25 mg total) by mouth 3 (three) times daily as needed for Anxiety.    benzonatate (TESSALON) 100 MG capsule Take 1 capsule (100 mg total) by mouth 3 (three) times daily as needed for Cough.    buPROPion (WELLBUTRIN) 100 MG tablet Take 1 tablet (100 mg total) by mouth every morning.    ergocalciferol (ERGOCALCIFEROL) 50,000 unit Cap Take 1 capsule (50,000 Units total) by mouth every 7 days.    esomeprazole magnesium (NEXIUM 24HR) 22.3 mg CpDR Take 1 tablet by mouth once daily.    estradiol 0.05 mg/24 hr td ptsw (VIVELLE-DOT) 0.05 mg/24 hr Place 1 patch onto the skin twice a week.     fluticasone (FLONASE) 50 mcg/actuation nasal spray TWO SPRAYS EACH NOSTRIL ONCE DAILY.    ibuprofen (ADVIL,MOTRIN) 800 MG tablet Take 800 mg by mouth every 6 (six) hours as needed for Pain.    lovastatin (MEVACOR) 40 MG tablet Take 1 tablet (40 mg total) by mouth every evening.    meloxicam (MOBIC) 15 MG tablet Take 1 tablet (15 mg total) by mouth once daily. Take with food    methocarbamol (ROBAXIN) 750 MG Tab Take 1 tablet (750 mg total) by mouth 4 (four) times daily.    metoprolol tartrate (LOPRESSOR) 25 MG tablet Take 1 tablet (25 mg total) by mouth 2 (two) times daily.    paroxetine (PAXIL) 40 MG tablet Take 1 tablet (40 mg total) by mouth every morning.    trazodone (DESYREL) 50 MG tablet Take 1.5 tablets (75 mg total) by mouth every evening.           Clinical Reference Information           Your Vitals Were     BP Pulse Temp Height Weight SpO2    136/74 (BP Location: Right arm) 76 97.1 °F (36.2 °C) (Tympanic) 5' 1" (1.549 m) 86.4 kg (190 lb 7.6 oz) 96%    BMI                35.99 kg/m2          Blood Pressure          Most Recent Value    BP  136/74      Allergies as of 3/21/2017     Adhesive    Codeine    Iodine Containing Multivitamin    Lanolin    Latex, Natural Rubber    Neosporin " [Benzalkonium Chloride]    Shellfish Containing Products    Neosporin (Neomycin-polymyx)      Immunizations Administered on Date of Encounter - 3/21/2017     Name Date Dose VIS Date Route    Pneumococcal Conjugate - 13 Valent 3/21/2017 0.5 mL 11/5/2015 Intramuscular      Orders Placed During Today's Visit      Normal Orders This Visit    Pneumococcal Conjugate Vaccine (13 Valent) (IM)       Language Assistance Services     ATTENTION: Language assistance services are available, free of charge. Please call [phone].      ATENCIÓN: Si sudhirla romel, tiene a smith disposición servicios gratuitos de asistencia lingüística. Llame al [phone].     TOAN Ý: N?u b?n nói Ti?ng Vi?t, có các d?ch v? h? tr? ngôn ng? mi?n phí dành cho b?n. G?i s? [phone].         [facility] - Internal Medicine complies with applicable Federal civil rights laws and does not discriminate on the basis of race, color, national origin, age, disability, or sex.

## 2017-05-09 ENCOUNTER — TELEPHONE (OUTPATIENT)
Dept: INTERNAL MEDICINE | Facility: CLINIC | Age: 62
End: 2017-05-09

## 2017-05-09 DIAGNOSIS — M54.50 ACUTE LOW BACK PAIN WITHOUT SCIATICA, UNSPECIFIED BACK PAIN LATERALITY: Primary | ICD-10-CM

## 2017-05-09 NOTE — TELEPHONE ENCOUNTER
----- Message from Memo Jackson sent at 5/9/2017  9:20 AM CDT -----  Contact: Pt  Pt request call from nurse to get a referral to see pain management for back pain, please contact pt at [phone]      Please refer to Dr. Garcia or Bernie.  SM

## 2017-05-25 ENCOUNTER — TELEPHONE (OUTPATIENT)
Dept: OPHTHALMOLOGY | Facility: CLINIC | Age: 62
End: 2017-05-25

## 2017-05-25 NOTE — TELEPHONE ENCOUNTER
"----- Message from Liza Razo sent at 5/25/2017 12:52 PM CDT -----  Contact: pt  States she would like to be advised she is seeing black dot on grid she was given did not want to schedule her acct is block pls advise [phone]      Called Ms. Chandra, voice message left for her, recommend she see someone asap if there are amsler changes "black spot" as is stated in message.  If not, she should see Dr. Tobin next week, he is out today and tomorrow.  I left our number, asked her to call and schedule a urgent care visit.  "

## 2017-05-26 ENCOUNTER — TELEPHONE (OUTPATIENT)
Dept: OPHTHALMOLOGY | Facility: CLINIC | Age: 62
End: 2017-05-26

## 2017-05-26 DIAGNOSIS — S62.102A AVULSION FRACTURE OF LEFT WRIST: ICD-10-CM

## 2017-05-26 RX ORDER — MELOXICAM 15 MG/1
TABLET ORAL
Qty: 30 TABLET | Refills: 0 | OUTPATIENT
Start: 2017-05-26

## 2017-05-26 NOTE — TELEPHONE ENCOUNTER
----- Message from Yuki Buenrostro sent at 5/26/2017  8:50 AM CDT -----  Contact: Patient  Patient needs to schedule an emergency visit on Monday with Dr Tobin, please call her back at [phone] or [phone]. Thank you

## 2017-05-26 NOTE — TELEPHONE ENCOUNTER
SPOKE WITH PATIENT AND SHE NOW HAS 3 BLACK SPOTS IN HER OD  ON THE AMSLER GRID INSTEAD OF 2 THAT SHE HAD YESTERDAY ON THE AMSLER PER DR. RODGERS SHE CAN COME NOW AND WE CAN DO AN MOCT AND SHE KEEP HER APPOINTMENT WITH [facility] ON Monday.

## 2017-06-21 ENCOUNTER — OFFICE VISIT (OUTPATIENT)
Dept: INTERNAL MEDICINE | Facility: CLINIC | Age: 62
End: 2017-06-21
Payer: COMMERCIAL

## 2017-06-21 VITALS
BODY MASS INDEX: 36.09 KG/M2 | DIASTOLIC BLOOD PRESSURE: 82 MMHG | HEART RATE: 72 BPM | TEMPERATURE: 97 F | HEIGHT: 61 IN | WEIGHT: 191.13 LBS | SYSTOLIC BLOOD PRESSURE: 136 MMHG | OXYGEN SATURATION: 96 %

## 2017-06-21 DIAGNOSIS — F41.0 PANIC ATTACK: ICD-10-CM

## 2017-06-21 DIAGNOSIS — E78.2 MIXED HYPERLIPIDEMIA: Primary | Chronic | ICD-10-CM

## 2017-06-21 DIAGNOSIS — Z29.9 PREVENTIVE MEASURE: ICD-10-CM

## 2017-06-21 DIAGNOSIS — F41.8 MIXED ANXIETY AND DEPRESSIVE DISORDER: ICD-10-CM

## 2017-06-21 PROCEDURE — 99213 OFFICE O/P EST LOW 20 MIN: CPT | Mod: S$GLB,,, | Performed by: INTERNAL MEDICINE

## 2017-06-21 PROCEDURE — 99999 PR PBB SHADOW E&M-EST. PATIENT-LVL III: CPT | Mod: PBBFAC,,, | Performed by: INTERNAL MEDICINE

## 2017-06-21 NOTE — PROGRESS NOTES
"Subjective:       Patient ID: Mouna Chandra is a 62 y.o. female.    Chief Complaint: Follow-up    Here for follow up of medical problems.  Leg cramps are doing well with Tums.  Anxiety doing well on rx.  Walks a lot during her job, no exertional cp/sob/palp.  BMs normal.  Having some left base of thumb pain, better with Alen back and body.    Updated/ annual due 12/17:  HM: 10/16 fluvax, 3/17 [id number], 8/08 TDaP, 10/16 zostavax, 1/15 Cscope at 52yo rep due 10y, 9/16 BMD/MMG/ Gyn Dr. Odom, 12/16 Eye Dr. Tobin, 7/16 HCV neg.          Review of Systems   Constitutional: Negative for chills, diaphoresis and fever.   Respiratory: Negative for cough and shortness of breath.    Cardiovascular: Negative for chest pain, palpitations and leg swelling.   Gastrointestinal: Negative for blood in stool, constipation, diarrhea, nausea and vomiting.   Genitourinary: Negative for dysuria, frequency and hematuria.   Psychiatric/Behavioral: The patient is not nervous/anxious.        Objective:   /82 (BP Location: Right arm)   Pulse 72   Temp 96.8 °F (36 °C) (Tympanic)   Ht 5' 1" (1.549 m)   Wt 86.7 kg (191 lb 2.2 oz)   SpO2 96%   BMI 36.12 kg/m²     Physical Exam   Constitutional: She is oriented to person, place, and time. She appears well-developed.   HENT:   Mouth/Throat: Oropharynx is clear and moist.   Neck: Neck supple. Carotid bruit is not present. No thyroid mass present.   Cardiovascular: Normal rate, regular rhythm and intact distal pulses.  Exam reveals no gallop and no friction rub.    No murmur heard.  Pulmonary/Chest: Effort normal and breath sounds normal. She has no wheezes. She has no rales.   Abdominal: Soft. Bowel sounds are normal. She exhibits no mass. There is no hepatosplenomegaly. There is no tenderness.   Musculoskeletal: She exhibits no edema.   Lymphadenopathy:     She has no cervical adenopathy.   Neurological: She is alert and oriented to person, place, and time. "   Psychiatric: She has a normal mood and affect.       Assessment:       1. Mixed hyperlipidemia    2. Mixed anxiety and depressive disorder    3. Panic attack    4. Preventive measure        Plan:       Mouna was seen today for follow-up.    Diagnoses and all orders for this visit:    Mixed hyperlipidemia- check lab.    Mixed anxiety and depressive disorder, Panic attack- doing well on rx.    Preventive measure- due in 6mo:  -     Comprehensive metabolic panel; Future  -     Lipid panel; Future  -     TSH; Future  -     CBC auto differential; Future  -     Vitamin D; Future  -     Hemoglobin A1c; Future

## 2017-07-18 DIAGNOSIS — S62.102A AVULSION FRACTURE OF LEFT WRIST: ICD-10-CM

## 2017-09-19 RX ORDER — MELOXICAM 15 MG/1
TABLET ORAL
Qty: 30 TABLET | Refills: 0 | OUTPATIENT
Start: 2017-09-19

## 2017-10-17 ENCOUNTER — HOSPITAL ENCOUNTER (EMERGENCY)
Facility: HOSPITAL | Age: 62
Discharge: HOME | End: 2017-10-17
Attending: EMERGENCY MEDICINE
Payer: COMMERCIAL

## 2017-10-17 VITALS
OXYGEN SATURATION: 96 % | BODY MASS INDEX: 35.87 KG/M2 | HEIGHT: 61 IN | TEMPERATURE: 99 F | DIASTOLIC BLOOD PRESSURE: 72 MMHG | HEART RATE: 78 BPM | WEIGHT: 190 LBS | RESPIRATION RATE: 18 BRPM | SYSTOLIC BLOOD PRESSURE: 117 MMHG

## 2017-10-17 DIAGNOSIS — R11.10 VOMITING AND DIARRHEA: Primary | ICD-10-CM

## 2017-10-17 DIAGNOSIS — R79.89 ELEVATED LFTS: ICD-10-CM

## 2017-10-17 DIAGNOSIS — R19.7 VOMITING AND DIARRHEA: Primary | ICD-10-CM

## 2017-10-17 LAB
ALBUMIN SERPL BCP-MCNC: 3.9 G/DL
ALP SERPL-CCNC: 114 U/L
ALT SERPL W/O P-5'-P-CCNC: 109 U/L
ANION GAP SERPL CALC-SCNC: 13 MMOL/L
AST SERPL-CCNC: 142 U/L
BACTERIA #/AREA URNS HPF: ABNORMAL /HPF
BASOPHILS # BLD AUTO: 0.02 K/UL
BASOPHILS NFR BLD: 0.4 %
BILIRUB SERPL-MCNC: 1 MG/DL
BILIRUB UR QL STRIP: ABNORMAL
BUN SERPL-MCNC: 40 MG/DL
CALCIUM SERPL-MCNC: 9.6 MG/DL
CHLORIDE SERPL-SCNC: 101 MMOL/L
CLARITY UR: CLEAR
CO2 SERPL-SCNC: 22 MMOL/L
COLOR UR: YELLOW
CREAT SERPL-MCNC: 1.2 MG/DL
DIFFERENTIAL METHOD: ABNORMAL
EOSINOPHIL # BLD AUTO: 0.2 K/UL
EOSINOPHIL NFR BLD: 3.5 %
ERYTHROCYTE [DISTWIDTH] IN BLOOD BY AUTOMATED COUNT: 13.6 %
EST. GFR  (AFRICAN AMERICAN): 56 ML/MIN/1.73 M^2
EST. GFR  (NON AFRICAN AMERICAN): 49 ML/MIN/1.73 M^2
GLUCOSE SERPL-MCNC: 113 MG/DL
GLUCOSE UR QL STRIP: NEGATIVE
HCT VFR BLD AUTO: 45.9 %
HGB BLD-MCNC: 16.2 G/DL
HGB UR QL STRIP: ABNORMAL
HYALINE CASTS #/AREA URNS LPF: 5 /LPF
KETONES UR QL STRIP: NEGATIVE
LEUKOCYTE ESTERASE UR QL STRIP: NEGATIVE
LYMPHOCYTES # BLD AUTO: 1 K/UL
LYMPHOCYTES NFR BLD: 20.4 %
MCH RBC QN AUTO: 30.6 PG
MCHC RBC AUTO-ENTMCNC: 35.3 G/DL
MCV RBC AUTO: 87 FL
MICROSCOPIC COMMENT: ABNORMAL
MONOCYTES # BLD AUTO: 0.4 K/UL
MONOCYTES NFR BLD: 8.6 %
NEUTROPHILS # BLD AUTO: 3.3 K/UL
NEUTROPHILS NFR BLD: 67.1 %
NITRITE UR QL STRIP: NEGATIVE
PH UR STRIP: 6 [PH] (ref 5–8)
PLATELET # BLD AUTO: 122 K/UL
PMV BLD AUTO: 9.9 FL
POTASSIUM SERPL-SCNC: 4 MMOL/L
PROT SERPL-MCNC: 8.4 G/DL
PROT UR QL STRIP: ABNORMAL
RBC # BLD AUTO: 5.29 M/UL
RBC #/AREA URNS HPF: 5 /HPF (ref 0–4)
SODIUM SERPL-SCNC: 136 MMOL/L
SP GR UR STRIP: >=1.03 (ref 1–1.03)
SQUAMOUS #/AREA URNS HPF: 10 /HPF
URN SPEC COLLECT METH UR: ABNORMAL
UROBILINOGEN UR STRIP-ACNC: ABNORMAL EU/DL
WBC # BLD AUTO: 4.9 K/UL
WBC #/AREA URNS HPF: 15 /HPF (ref 0–5)

## 2017-10-17 PROCEDURE — 25000003 PHARM REV CODE 250: Performed by: EMERGENCY MEDICINE

## 2017-10-17 PROCEDURE — 99284 EMERGENCY DEPT VISIT MOD MDM: CPT | Mod: 25

## 2017-10-17 PROCEDURE — 36415 COLL VENOUS BLD VENIPUNCTURE: CPT

## 2017-10-17 PROCEDURE — 81000 URINALYSIS NONAUTO W/SCOPE: CPT

## 2017-10-17 PROCEDURE — 96361 HYDRATE IV INFUSION ADD-ON: CPT

## 2017-10-17 PROCEDURE — 85025 COMPLETE CBC W/AUTO DIFF WBC: CPT

## 2017-10-17 PROCEDURE — 96374 THER/PROPH/DIAG INJ IV PUSH: CPT

## 2017-10-17 PROCEDURE — 63600175 PHARM REV CODE 636 W HCPCS: Performed by: EMERGENCY MEDICINE

## 2017-10-17 PROCEDURE — 80074 ACUTE HEPATITIS PANEL: CPT

## 2017-10-17 PROCEDURE — 80053 COMPREHEN METABOLIC PANEL: CPT

## 2017-10-17 RX ORDER — ONDANSETRON 2 MG/ML
4 INJECTION INTRAMUSCULAR; INTRAVENOUS
Status: COMPLETED | OUTPATIENT
Start: 2017-10-17 | End: 2017-10-17

## 2017-10-17 RX ORDER — ESTRADIOL 10 UG/1
1 INSERT VAGINAL
COMMUNITY
End: 2020-05-06

## 2017-10-17 RX ORDER — ONDANSETRON 4 MG/1
4 TABLET, FILM COATED ORAL EVERY 8 HOURS PRN
Qty: 12 TABLET | Refills: 0 | Status: SHIPPED | OUTPATIENT
Start: 2017-10-17 | End: 2020-05-06

## 2017-10-17 RX ORDER — ONDANSETRON 4 MG/1
4 TABLET, FILM COATED ORAL EVERY 8 HOURS PRN
Qty: 12 TABLET | Refills: 0 | Status: SHIPPED | OUTPATIENT
Start: 2017-10-17 | End: 2017-10-17

## 2017-10-17 RX ADMIN — SODIUM CHLORIDE 1000 ML: 0.9 INJECTION, SOLUTION INTRAVENOUS at 05:10

## 2017-10-17 RX ADMIN — ONDANSETRON 4 MG: 2 INJECTION INTRAMUSCULAR; INTRAVENOUS at 04:10

## 2017-10-17 RX ADMIN — SODIUM CHLORIDE 1000 ML: 0.9 INJECTION, SOLUTION INTRAVENOUS at 04:10

## 2017-10-17 NOTE — ED PROVIDER NOTES
SCRIBE #1 NOTE: I, Maggie Lantigua, am scribing for, and in the presence of, Gen Schwab MD. I have scribed the entire note.      History      Chief Complaint   Patient presents with    Emesis     pt reports NVD on/off since friday.        Review of patient's allergies indicates:   Allergen Reactions    Adhesive Rash    Codeine Nausea And Vomiting    Iodine containing multivitamin Nausea Only    Lanolin Hives    Latex, natural rubber Hives    Neosporin [benzalkonium chloride] Hives    Shellfish containing products Nausea And Vomiting    Neosporin (neomycin-polymyx) Hives, Itching and Rash        HPI   HPI    10/17/2017, 4:14 AM   History obtained from the patient      History of Present Illness: Mouna Chandra is a 62 y.o. female patient who presents to the Emergency Department for nausea which onset gradually at 2am. Pt reports having fever (Tmax 101F) and episodes of vomiting and diarrhea the last 4 days. Pt states fever, vomiting, and diarrhea resolved today so she ate a hamburger, but began feeling bad again at 2am. Pt denies any sick contacts, recent travel, and recent abx use. Symptoms are constant and moderate in severity. No mitigating or exacerbating factors reported. No associated sxs included. Patient denies any fever, chills, abd pain, flank pain, dysuria, hematuria, constipation, and all other sxs at this time. No further complaints or concerns at this time.     Arrival mode: [facility]    PCP: Ofe Duff MD       Past Medical History:  Past Medical History:   Diagnosis Date    Anxiety 12/21/2016    Anxiety and depression     Familial juvenile macular degeneration syndrome - Both Eyes 11/12/2013    Hyperlipidemia LDL goal < 100     Lumbar disc disease     Dr. Chandra    Palpitation 12/21/2016    Panic attack 12/21/2016    Vitamin D deficiency        Past Surgical History:  Past Surgical History:   Procedure Laterality Date    TOTAL ABDOMINAL HYSTERECTOMY W/ BILATERAL  SALPINGOOPHORECTOMY           Family History:  Family History   Problem Relation Age of Onset    Diabetes Mother     Hypertension Mother     Heart failure Father     Heart attack Father     Macular degeneration Sister     Amblyopia Sister     Cataracts Paternal Uncle     Heart failure Paternal Uncle     Stroke Paternal Uncle     Heart failure Paternal Grandfather     Heart disease Brother 45    Heart attack Paternal Grandmother     Blindness Neg Hx     Cancer Neg Hx     Glaucoma Neg Hx     Retinal detachment Neg Hx     Strabismus Neg Hx     Thyroid disease Neg Hx     Colon cancer Neg Hx        Social History:  Social History     Social History Main Topics    Smoking status: Passive Smoke Exposure - Never Smoker    Smokeless tobacco: Never Used    Alcohol use 0.6 oz/week     1 Standard drinks or equivalent per week      Comment: Socially    Drug use: No    Sexual activity: Yes     Partners: Male       ROS   Review of Systems   Constitutional: Negative for chills and fever.   HENT: Negative for sore throat.    Respiratory: Negative for shortness of breath.    Cardiovascular: Negative for chest pain.   Gastrointestinal: Positive for nausea. Negative for abdominal pain, constipation, diarrhea and vomiting.   Genitourinary: Negative for dysuria, frequency and hematuria.   Musculoskeletal: Negative for back pain.   Skin: Negative for rash.   Neurological: Negative for weakness.   Hematological: Does not bruise/bleed easily.   All other systems reviewed and are negative.      Physical Exam      Initial Vitals [10/17/17 0356]   BP Pulse Resp Temp SpO2   (!) 120/90 82 18 98.7 °F (37.1 °C) 96 %      MAP       100          Physical Exam  Nursing Notes and Vital Signs Reviewed.  Constitutional: Patient is in no acute distress. Well-developed and well-nourished.  Head: Atraumatic. Normocephalic.  Eyes: PERRL. EOM intact. Conjunctivae are not pale. No scleral icterus.  ENT: Mucous membranes are moist.  "Oropharynx is clear and symmetric.    Neck: Supple. Full ROM. No lymphadenopathy.  Cardiovascular: Regular rate. Regular rhythm. No murmurs, rubs, or gallops. Distal pulses are 2+ and symmetric.  Pulmonary/Chest: No respiratory distress. Clear to auscultation bilaterally. No wheezing, rales, or rhonchi.  Abdominal: Soft and non-distended.  There is no tenderness.  No rebound, guarding, or rigidity. Good bowel sounds.  Genitourinary: No CVA tenderness  Musculoskeletal: Moves all extremities. No obvious deformities. No edema. No calf tenderness.  Skin: Warm and dry.  Neurological:  Alert, awake, and appropriate.  Normal speech.  No acute focal neurological deficits are appreciated.  Psychiatric: Normal affect. Good eye contact. Appropriate in content.    ED Course    Procedures  ED Vital Signs:  Vitals:    10/17/17 0356 10/17/17 0712   BP: (!) 120/90 117/72   Pulse: 82 78   Resp: 18    Temp: 98.7 °F (37.1 °C)    TempSrc: Oral    SpO2: 96%    Weight: 86.2 kg (190 lb)    Height: 5' 1" (1.549 m)        Abnormal Lab Results:  Labs Reviewed   CBC W/ AUTO DIFFERENTIAL - Abnormal; Notable for the following:        Result Value    Hemoglobin 16.2 (*)     Platelets 122 (*)     All other components within normal limits   COMPREHENSIVE METABOLIC PANEL - Abnormal; Notable for the following:     CO2 22 (*)     Glucose 113 (*)     BUN, Bld 40 (*)      (*)      (*)     eGFR if  56 (*)     eGFR if non  49 (*)     All other components within normal limits   URINALYSIS - Abnormal; Notable for the following:     Specific Gravity, UA >=1.030 (*)     Protein, UA 2+ (*)     Bilirubin (UA) 2+ (*)     Occult Blood UA Trace (*)     Urobilinogen, UA 4.0-6.0 (*)     All other components within normal limits   URINALYSIS MICROSCOPIC - Abnormal; Notable for the following:     RBC, UA 5 (*)     WBC, UA 15 (*)     Bacteria, UA Many (*)     Hyaline Casts, UA 5 (*)     All other components within normal " limits   HEPATITIS PANEL, ACUTE   HEPATITIS PANEL, ACUTE        All Lab Results:  Results for orders placed or performed during the hospital encounter of 10/17/17   CBC auto differential   Result Value Ref Range    WBC 4.90 3.90 - 12.70 K/uL    RBC 5.29 4.00 - 5.40 M/uL    Hemoglobin 16.2 (H) 12.0 - 16.0 g/dL    Hematocrit 45.9 37.0 - 48.5 %    MCV 87 82 - 98 fL    MCH 30.6 27.0 - 31.0 pg    MCHC 35.3 32.0 - 36.0 g/dL    RDW 13.6 11.5 - 14.5 %    Platelets 122 (L) 150 - 350 K/uL    MPV 9.9 9.2 - 12.9 fL    Gran # 3.3 1.8 - 7.7 K/uL    Lymph # 1.0 1.0 - 4.8 K/uL    Mono # 0.4 0.3 - 1.0 K/uL    Eos # 0.2 0.0 - 0.5 K/uL    Baso # 0.02 0.00 - 0.20 K/uL    Gran% 67.1 38.0 - 73.0 %    Lymph% 20.4 18.0 - 48.0 %    Mono% 8.6 4.0 - 15.0 %    Eosinophil% 3.5 0.0 - 8.0 %    Basophil% 0.4 0.0 - 1.9 %    Differential Method Automated    Comprehensive metabolic panel   Result Value Ref Range    Sodium 136 136 - 145 mmol/L    Potassium 4.0 3.5 - 5.1 mmol/L    Chloride 101 95 - 110 mmol/L    CO2 22 (L) 23 - 29 mmol/L    Glucose 113 (H) 70 - 110 mg/dL    BUN, Bld 40 (H) 8 - 23 mg/dL    Creatinine 1.2 0.5 - 1.4 mg/dL    Calcium 9.6 8.7 - 10.5 mg/dL    Total Protein 8.4 6.0 - 8.4 g/dL    Albumin 3.9 3.5 - 5.2 g/dL    Total Bilirubin 1.0 0.1 - 1.0 mg/dL    Alkaline Phosphatase 114 55 - 135 U/L     (H) 10 - 40 U/L     (H) 10 - 44 U/L    Anion Gap 13 8 - 16 mmol/L    eGFR if African American 56 (A) >60 mL/min/1.73 m^2    eGFR if non African American 49 (A) >60 mL/min/1.73 m^2   Urinalysis   Result Value Ref Range    Specimen UA Urine, Clean Catch     Color, UA Yellow Yellow, Straw, Aminta    Appearance, UA Clear Clear    pH, UA 6.0 5.0 - 8.0    Specific Gravity, UA >=1.030 (A) 1.005 - 1.030    Protein, UA 2+ (A) Negative    Glucose, UA Negative Negative    Ketones, UA Negative Negative    Bilirubin (UA) 2+ (A) Negative    Occult Blood UA Trace (A) Negative    Nitrite, UA Negative Negative    Urobilinogen, UA 4.0-6.0 (A) <2.0  EU/dL    Leukocytes, UA Negative Negative   Urinalysis Microscopic   Result Value Ref Range    RBC, UA 5 (H) 0 - 4 /hpf    WBC, UA 15 (H) 0 - 5 /hpf    Bacteria, UA Many (A) None-Occ /hpf    Squam Epithel, UA 10 /hpf    Hyaline Casts, UA 5 (A) 0-1/lpf /lpf    Microscopic Comment SEE COMMENT        Imaging Results:  Imaging Results    None                 The Emergency Provider reviewed the vital signs and test results, which are outlined above.    ED Discussion     5:34 AM: Reassessed pt at this time.  Pt states her condition has improved at this time. Discussed with pt all pertinent ED information and results. Discussed pt dx of vomiting and diarrhea and plan of tx. Gave pt all f/u and return to the ED instructions. All questions and concerns were addressed at this time. Pt expresses understanding of information and instructions, and is comfortable with plan to discharge. Pt is stable for discharge.    I discussed with patient and/or family/caretaker that evaluation in the ED does not suggest any emergent or life threatening medical conditions requiring immediate intervention beyond what was provided in the ED, and I believe patient is safe for discharge.  Regardless, an unremarkable evaluation in the ED does not preclude the development or presence of a serious of life threatening condition. As such, patient was instructed to return immediately for any worsening or change in current symptoms.      ED Medication(s):  Medications   sodium chloride 0.9% bolus 1,000 mL (0 mLs Intravenous Stopped 10/17/17 0544)   ondansetron injection 4 mg (4 mg Intravenous Given 10/17/17 0446)   sodium chloride 0.9% bolus 1,000 mL (0 mLs Intravenous Stopped 10/17/17 0706)       Discharge Medication List as of 10/17/2017  7:04 AM      START taking these medications    Details   ondansetron (ZOFRAN) 4 MG tablet Take 1 tablet (4 mg total) by mouth every 8 (eight) hours as needed for Nausea., Starting Tue 10/17/2017, Print              Follow-up Information     Ofe Duff MD In 2 days.    Specialty:  Internal Medicine  Contact information:  [address]  [facility] [address] [phone]             [facility] - .    Specialty:  Emergency Medicine  Why:  As needed, If symptoms worsen  Contact information:  [address]  [facility] [address] [phone]                   Medical Decision Making    Medical Decision Making:   Clinical Tests:   Lab Tests: Ordered and Reviewed           Scribe Attestation:   Scribe #1: I performed the above scribed service and the documentation accurately describes the services I performed. I attest to the accuracy of the note.    Attending:   Physician Attestation Statement for Scribe #1: I, Gen Schwab MD, personally performed the services described in this documentation, as scribed by Maggie Lantigua, in my presence, and it is both accurate and complete.          Clinical Impression       ICD-10-CM ICD-9-CM   1. Vomiting and diarrhea R11.10 787.03    R19.7 787.91   2. Elevated LFTs R79.89 790.6       Disposition:   Disposition: Discharged  Condition: Stable         Gen Schwab MD  10/18/17 0541

## 2017-10-18 LAB
HAV IGM SERPL QL IA: NEGATIVE
HBV CORE IGM SERPL QL IA: NEGATIVE
HBV SURFACE AG SERPL QL IA: NEGATIVE
HCV AB SERPL QL IA: NEGATIVE

## 2017-10-24 ENCOUNTER — LAB VISIT (OUTPATIENT)
Dept: LAB | Facility: HOSPITAL | Age: 62
End: 2017-10-24
Attending: PHYSICIAN ASSISTANT
Payer: COMMERCIAL

## 2017-10-24 ENCOUNTER — OFFICE VISIT (OUTPATIENT)
Dept: INTERNAL MEDICINE | Facility: CLINIC | Age: 62
End: 2017-10-24
Payer: COMMERCIAL

## 2017-10-24 ENCOUNTER — TELEPHONE (OUTPATIENT)
Dept: INTERNAL MEDICINE | Facility: CLINIC | Age: 62
End: 2017-10-24

## 2017-10-24 VITALS
SYSTOLIC BLOOD PRESSURE: 120 MMHG | HEIGHT: 61 IN | DIASTOLIC BLOOD PRESSURE: 74 MMHG | BODY MASS INDEX: 35.5 KG/M2 | WEIGHT: 188.06 LBS | HEART RATE: 82 BPM | OXYGEN SATURATION: 97 % | TEMPERATURE: 97 F

## 2017-10-24 DIAGNOSIS — R82.90 ABNORMAL URINALYSIS: ICD-10-CM

## 2017-10-24 DIAGNOSIS — R19.7 DIARRHEA, UNSPECIFIED TYPE: ICD-10-CM

## 2017-10-24 DIAGNOSIS — R74.8 ELEVATED LIVER ENZYMES: ICD-10-CM

## 2017-10-24 DIAGNOSIS — R10.9 ABDOMINAL PAIN, UNSPECIFIED ABDOMINAL LOCATION: ICD-10-CM

## 2017-10-24 DIAGNOSIS — K92.1 BLOOD IN STOOL: ICD-10-CM

## 2017-10-24 DIAGNOSIS — R94.4 DECREASED GFR: ICD-10-CM

## 2017-10-24 DIAGNOSIS — R11.2 NAUSEA AND VOMITING, INTRACTABILITY OF VOMITING NOT SPECIFIED, UNSPECIFIED VOMITING TYPE: ICD-10-CM

## 2017-10-24 DIAGNOSIS — R74.8 ELEVATED LIVER ENZYMES: Primary | ICD-10-CM

## 2017-10-24 LAB
ALBUMIN SERPL BCP-MCNC: 3.5 G/DL
ALP SERPL-CCNC: 81 U/L
ALT SERPL W/O P-5'-P-CCNC: 42 U/L
ANION GAP SERPL CALC-SCNC: 7 MMOL/L
AST SERPL-CCNC: 27 U/L
BASOPHILS # BLD AUTO: 0.04 K/UL
BASOPHILS NFR BLD: 0.7 %
BILIRUB SERPL-MCNC: 0.3 MG/DL
BUN SERPL-MCNC: 20 MG/DL
CALCIUM SERPL-MCNC: 9.3 MG/DL
CHLORIDE SERPL-SCNC: 106 MMOL/L
CO2 SERPL-SCNC: 27 MMOL/L
CREAT SERPL-MCNC: 0.8 MG/DL
DIFFERENTIAL METHOD: ABNORMAL
EOSINOPHIL # BLD AUTO: 0.2 K/UL
EOSINOPHIL NFR BLD: 3.4 %
ERYTHROCYTE [DISTWIDTH] IN BLOOD BY AUTOMATED COUNT: 13.1 %
EST. GFR  (AFRICAN AMERICAN): >60 ML/MIN/1.73 M^2
EST. GFR  (NON AFRICAN AMERICAN): >60 ML/MIN/1.73 M^2
GLUCOSE SERPL-MCNC: 91 MG/DL
HCT VFR BLD AUTO: 36.6 %
HGB BLD-MCNC: 12.2 G/DL
LYMPHOCYTES # BLD AUTO: 1.7 K/UL
LYMPHOCYTES NFR BLD: 28.7 %
MCH RBC QN AUTO: 29.8 PG
MCHC RBC AUTO-ENTMCNC: 33.3 G/DL
MCV RBC AUTO: 89 FL
MONOCYTES # BLD AUTO: 0.6 K/UL
MONOCYTES NFR BLD: 10.1 %
NEUTROPHILS # BLD AUTO: 3.4 K/UL
NEUTROPHILS NFR BLD: 57.1 %
PLATELET # BLD AUTO: 241 K/UL
PMV BLD AUTO: 9.1 FL
POTASSIUM SERPL-SCNC: 4.6 MMOL/L
PROT SERPL-MCNC: 7.1 G/DL
RBC # BLD AUTO: 4.1 M/UL
SODIUM SERPL-SCNC: 140 MMOL/L
WBC # BLD AUTO: 5.93 K/UL

## 2017-10-24 PROCEDURE — 85025 COMPLETE CBC W/AUTO DIFF WBC: CPT | Mod: PO

## 2017-10-24 PROCEDURE — 36415 COLL VENOUS BLD VENIPUNCTURE: CPT | Mod: PO

## 2017-10-24 PROCEDURE — 99214 OFFICE O/P EST MOD 30 MIN: CPT | Mod: S$GLB,,, | Performed by: PHYSICIAN ASSISTANT

## 2017-10-24 PROCEDURE — 80053 COMPREHEN METABOLIC PANEL: CPT | Mod: PO

## 2017-10-24 PROCEDURE — 99999 PR PBB SHADOW E&M-EST. PATIENT-LVL V: CPT | Mod: PBBFAC,,, | Performed by: PHYSICIAN ASSISTANT

## 2017-10-24 NOTE — TELEPHONE ENCOUNTER
----- Message from ABDIRASHID Frankel sent at 10/24/2017 12:01 PM CDT -----  Urine shows likely infection, culture pending. Will choose antibiotic after review of culture results.

## 2017-10-24 NOTE — TELEPHONE ENCOUNTER
----- Message from ABDIRASHID Frankel sent at 10/24/2017 11:32 AM CDT -----  Blood results improved since ER visit. Normal kidney function and normal liver enzymes.

## 2017-10-24 NOTE — PROGRESS NOTES
Subjective:       Patient ID: Mouna Chandra is a 62 y.o. female.    Chief Complaint: Follow-up ([facility] f/u)    Pt presents to clinic for [facility] 10/17/17 f/u. She reports onset of N/V/D and LUQ abdominal discomfort 10/13/17. She reports associated fever up to 101F with associated chills, which resolved on its own after a few days. She reports having small amount of bright red and pink blood in her stool intermittently with her diarrhea. She reports sxs seemed to almost resolve after a few days, then they recurred 10/17/17 and she went to the ER. She had blood work and urine studies - liver enzymes were elevated and kidney function decreased. She was given IV hydration and Zofran. She reports having to take only one Zofran since discharge from ER, as sxs completely resolved (except for LUQ abdominal discomfort) 10/19/17. She reports eating without any difficulty and is hydrating orally. She reports no CP, SOB, other sites of abnormal bleeding, urinary sxs, or other medical complaints.    PCP: Dr. Velez    Patient Active Problem List:     Familial juvenile macular degeneration syndrome - Both Eyes     Mixed hyperlipidemia     Mixed anxiety and depressive disorder     Lumbar disc disease     Vitamin D deficiency     Allergic rhinitis     GERD (gastroesophageal reflux disease)     Diaphragmatic hernia without mention of obstruction or gangrene     Panic attack     Palpitations      Review of Systems   Constitutional: Negative for chills and fever.   Respiratory: Negative for cough and shortness of breath.    Cardiovascular: Negative for chest pain, palpitations and leg swelling.   Gastrointestinal: Positive for diarrhea, nausea and vomiting. Negative for abdominal pain, blood in stool and constipation.   Genitourinary: Negative for difficulty urinating, dysuria, frequency, hematuria and urgency.   Skin: Negative for rash.   Neurological: Negative for dizziness, weakness, numbness and headaches.  "  Psychiatric/Behavioral: Negative for confusion.       Objective:       Vitals:    10/24/17 0914   BP: 120/74   BP Location: Right arm   Patient Position: Sitting   BP Method: Small (Manual)   Pulse: 82   Temp: 96.6 °F (35.9 °C)   TempSrc: Tympanic   SpO2: 97%   Weight: 85.3 kg (188 lb 0.8 oz)   Height: 5' 1" (1.549 m)     Physical Exam   Constitutional: She is oriented to person, place, and time. She appears well-developed and well-nourished. No distress.   HENT:   Head: Normocephalic and atraumatic.   Mouth/Throat: Oropharynx is clear and moist.   Eyes: EOM are normal. No scleral icterus.   Neck: Neck supple.   Cardiovascular: Normal rate and regular rhythm.    Pulmonary/Chest: Effort normal and breath sounds normal. No respiratory distress. She has no decreased breath sounds. She has no wheezes. She has no rhonchi. She has no rales.   Abdominal: Soft. Bowel sounds are normal. She exhibits no distension and no mass. There is tenderness in the right upper quadrant, right lower quadrant and left upper quadrant. There is no rigidity, no rebound, no guarding, no CVA tenderness, no tenderness at McBurney's point and negative Schafer's sign.   Musculoskeletal: Normal range of motion. She exhibits no edema.   Neurological: She is alert and oriented to person, place, and time. No cranial nerve deficit.   Skin: Skin is warm and dry. No rash noted.   Psychiatric: She has a normal mood and affect. Her speech is normal and behavior is normal. Thought content normal.       Component      Latest Ref Rng & Units 10/17/2017   WBC      3.90 - 12.70 K/uL 4.90   RBC      4.00 - 5.40 M/uL 5.29   Hemoglobin      12.0 - 16.0 g/dL 16.2 (H)   Hematocrit      37.0 - 48.5 % 45.9   MCV      82 - 98 fL 87   MCH      27.0 - 31.0 pg 30.6   MCHC      32.0 - 36.0 g/dL 35.3   RDW      11.5 - 14.5 % 13.6   Platelets      150 - 350 K/uL 122 (L)   MPV      9.2 - 12.9 fL 9.9   Gran #      1.8 - 7.7 K/uL 3.3   Lymph #      1.0 - 4.8 K/uL 1.0   Mono " "#      0.3 - 1.0 K/uL 0.4   Eos #      0.0 - 0.5 K/uL 0.2   Baso #      0.00 - 0.20 K/uL 0.02   Gran%      38.0 - 73.0 % 67.1   Lymph%      18.0 - 48.0 % 20.4   Mono%      4.0 - 15.0 % 8.6   Eosinophil%      0.0 - 8.0 % 3.5   Basophil%      0.0 - 1.9 % 0.4   Differential Method       Automated   Sodium      136 - 145 mmol/L 136   Potassium      3.5 - 5.1 mmol/L 4.0   Chloride      95 - 110 mmol/L 101   CO2      23 - 29 mmol/L 22 (L)   Glucose      70 - 110 mg/dL 113 (H)   BUN, Bld      8 - 23 mg/dL 40 (H)   Creatinine      0.5 - 1.4 mg/dL 1.2   Calcium      8.7 - 10.5 mg/dL 9.6   Total Protein      6.0 - 8.4 g/dL 8.4   Albumin      3.5 - 5.2 g/dL 3.9   Total Bilirubin      0.1 - 1.0 mg/dL 1.0   Alkaline Phosphatase      55 - 135 U/L 114   AST      10 - 40 U/L 142 (H)   ALT      10 - 44 U/L 109 (H)   Anion Gap      8 - 16 mmol/L 13   eGFR if African American      >60 mL/min/1.73 m:2 56 (A)   eGFR if non African American      >60 mL/min/1.73 m:2 49 (A)   Specimen UA       Urine, Clean Catch   Color, UA      Yellow, Straw, Aminta Yellow   Appearance, UA      Clear Clear   pH, UA      5.0 - 8.0 6.0   Specific Gravity, UA      1.005 - 1.030 >=1.030 (A)   Protein, UA      Negative 2+ (A)   Glucose, UA      Negative Negative   Ketones, UA      Negative Negative   Bilirubin (UA)      Negative 2+ (A)   Occult Blood UA      Negative Trace (A)   Nitrite, UA      Negative Negative   Urobilinogen, UA      <2.0 EU/dL 4.0-6.0 (A)   Leukocytes, UA      Negative Negative   RBC, UA      0 - 4 /hpf 5 (H)   WBC, UA      0 - 5 /hpf 15 (H)   Bacteria, UA      None-Occ /hpf Many (A)   Squam Epithel, UA      /hpf 10   Hyaline Casts, UA      0-1/lpf /lpf 5 (A)   Microscopic Comment       SEE COMMENT   Hepatitis B Surface Ag       Negative   Hep B C IgM       Negative   Hep A IgM       Negative   Hepatitis C Ab       Negative     Upper GI endoscopy Jan 2015: "- Z-line regular, 35 cm from the incisors.                        - Normal " "esophagus.                        - Hiatus hernia.                        - Normal stomach.                        - Normal examined duodenum."    Cscope Jan 2015: "- The examined portion of the ileum was normal. The entire examined colon is normal on direct and retroflexion views."  Assessment:       1. Elevated liver enzymes    2. Abnormal urinalysis    3. Decreased GFR    4. Diarrhea, unspecified type    5. Nausea and vomiting, intractability of vomiting not specified, unspecified vomiting type    6. Abdominal pain, unspecified abdominal location    7. Blood in stool        Plan:         Mouna was seen today for follow-up.    Diagnoses and all orders for this visit:    Elevated liver enzymes  -     Comprehensive metabolic panel; Future    Abnormal urinalysis  -     Urinalysis; Future  -     Urine culture; Future  Pt denies urinary sxs at this time.    Decreased GFR  -     Comprehensive metabolic panel; Future    Diarrhea, Nausea and vomiting  Sxs resolved. Stay hydrated and monitor for recurrence. Healthy diet.    Abdominal pain, Blood in stool  -     CBC auto differential; Future  -     Urinalysis; Future  -     Comprehensive metabolic panel; Future  -     Urine culture; Future  -     CT Abdomen Pelvis W Wo Contrast; Future  Further recommendations to follow review of pending results. ER if severe sxs occur.    F/u with PCP as scheduled in 2 months for health management. RTC sooner if needed.      "

## 2017-10-25 ENCOUNTER — HOSPITAL ENCOUNTER (OUTPATIENT)
Dept: RADIOLOGY | Facility: HOSPITAL | Age: 62
Discharge: HOME | End: 2017-10-25
Attending: PHYSICIAN ASSISTANT
Payer: COMMERCIAL

## 2017-10-25 DIAGNOSIS — R19.7 DIARRHEA, UNSPECIFIED TYPE: ICD-10-CM

## 2017-10-25 DIAGNOSIS — R11.2 NAUSEA AND VOMITING, INTRACTABILITY OF VOMITING NOT SPECIFIED, UNSPECIFIED VOMITING TYPE: ICD-10-CM

## 2017-10-25 DIAGNOSIS — R10.9 ABDOMINAL PAIN, UNSPECIFIED ABDOMINAL LOCATION: ICD-10-CM

## 2017-10-25 DIAGNOSIS — K92.1 BLOOD IN STOOL: ICD-10-CM

## 2017-10-25 PROCEDURE — 74178 CT ABD&PLV WO CNTR FLWD CNTR: CPT | Mod: 26,,, | Performed by: RADIOLOGY

## 2017-10-25 PROCEDURE — 25500020 PHARM REV CODE 255: Mod: PO | Performed by: PHYSICIAN ASSISTANT

## 2017-10-25 PROCEDURE — 74178 CT ABD&PLV WO CNTR FLWD CNTR: CPT | Mod: TC,PO

## 2017-10-25 RX ADMIN — IOHEXOL 100 ML: 350 INJECTION, SOLUTION INTRAVENOUS at 01:10

## 2017-10-25 RX ADMIN — IOHEXOL 30 ML: 350 INJECTION, SOLUTION INTRAVENOUS at 12:10

## 2017-10-26 ENCOUNTER — TELEPHONE (OUTPATIENT)
Dept: INTERNAL MEDICINE | Facility: CLINIC | Age: 62
End: 2017-10-26

## 2017-10-26 NOTE — TELEPHONE ENCOUNTER
----- Message from ABDIRASHID Frankel sent at 10/26/2017  8:05 AM CDT -----  Abd/pelv CT shows lower lung nodules - stable from 2013. Diverticulosis but not acute flare-up. She does have a hiatal hernia and mild arthritis in spine. Possible tiny cyst in left kidney. No worrisome findings to explain her recent sxs. Let us know if sxs persist or worsen.

## 2017-10-27 ENCOUNTER — TELEPHONE (OUTPATIENT)
Dept: INTERNAL MEDICINE | Facility: CLINIC | Age: 62
End: 2017-10-27

## 2017-10-27 DIAGNOSIS — N39.0 URINARY TRACT INFECTION WITHOUT HEMATURIA, SITE UNSPECIFIED: Primary | ICD-10-CM

## 2017-10-27 RX ORDER — DOXYCYCLINE 100 MG/1
100 CAPSULE ORAL EVERY 12 HOURS
Qty: 20 CAPSULE | Refills: 0 | Status: SHIPPED | OUTPATIENT
Start: 2017-10-27 | End: 2017-11-06

## 2017-12-06 DIAGNOSIS — R00.2 PALPITATIONS: ICD-10-CM

## 2017-12-06 RX ORDER — METOPROLOL TARTRATE 25 MG/1
TABLET, FILM COATED ORAL
Qty: 60 TABLET | Refills: 6 | Status: SHIPPED | OUTPATIENT
Start: 2017-12-06 | End: 2020-05-06

## 2017-12-21 ENCOUNTER — LAB VISIT (OUTPATIENT)
Dept: LAB | Facility: HOSPITAL | Age: 62
End: 2017-12-21
Attending: INTERNAL MEDICINE
Payer: COMMERCIAL

## 2017-12-21 DIAGNOSIS — Z29.9 PREVENTIVE MEASURE: ICD-10-CM

## 2017-12-21 DIAGNOSIS — E78.2 MIXED HYPERLIPIDEMIA: Chronic | ICD-10-CM

## 2017-12-21 LAB
25(OH)D3+25(OH)D2 SERPL-MCNC: 28 NG/ML
ALBUMIN SERPL BCP-MCNC: 3.6 G/DL
ALP SERPL-CCNC: 65 U/L
ALT SERPL W/O P-5'-P-CCNC: 16 U/L
ANION GAP SERPL CALC-SCNC: 4 MMOL/L
AST SERPL-CCNC: 19 U/L
BASOPHILS # BLD AUTO: 0.06 K/UL
BASOPHILS NFR BLD: 1 %
BILIRUB SERPL-MCNC: 0.4 MG/DL
BUN SERPL-MCNC: 19 MG/DL
CALCIUM SERPL-MCNC: 9.3 MG/DL
CHLORIDE SERPL-SCNC: 105 MMOL/L
CHOLEST SERPL-MCNC: 149 MG/DL
CHOLEST/HDLC SERPL: 3 {RATIO}
CO2 SERPL-SCNC: 29 MMOL/L
CREAT SERPL-MCNC: 0.8 MG/DL
DIFFERENTIAL METHOD: NORMAL
EOSINOPHIL # BLD AUTO: 0.2 K/UL
EOSINOPHIL NFR BLD: 3.1 %
ERYTHROCYTE [DISTWIDTH] IN BLOOD BY AUTOMATED COUNT: 13 %
EST. GFR  (AFRICAN AMERICAN): >60 ML/MIN/1.73 M^2
EST. GFR  (NON AFRICAN AMERICAN): >60 ML/MIN/1.73 M^2
ESTIMATED AVG GLUCOSE: 100 MG/DL
GLUCOSE SERPL-MCNC: 100 MG/DL
HBA1C MFR BLD HPLC: 5.1 %
HCT VFR BLD AUTO: 37.5 %
HDLC SERPL-MCNC: 49 MG/DL
HDLC SERPL: 32.9 %
HGB BLD-MCNC: 12.4 G/DL
IMM GRANULOCYTES # BLD AUTO: 0.01 K/UL
IMM GRANULOCYTES NFR BLD AUTO: 0.2 %
LDLC SERPL CALC-MCNC: 90.4 MG/DL
LYMPHOCYTES # BLD AUTO: 1.5 K/UL
LYMPHOCYTES NFR BLD: 25.7 %
MCH RBC QN AUTO: 29.5 PG
MCHC RBC AUTO-ENTMCNC: 33.1 G/DL
MCV RBC AUTO: 89 FL
MONOCYTES # BLD AUTO: 0.4 K/UL
MONOCYTES NFR BLD: 7.2 %
NEUTROPHILS # BLD AUTO: 3.6 K/UL
NEUTROPHILS NFR BLD: 62.8 %
NONHDLC SERPL-MCNC: 100 MG/DL
NRBC BLD-RTO: 0 /100 WBC
PLATELET # BLD AUTO: 218 K/UL
PMV BLD AUTO: 9.9 FL
POTASSIUM SERPL-SCNC: 4 MMOL/L
PROT SERPL-MCNC: 7.3 G/DL
RBC # BLD AUTO: 4.2 M/UL
SODIUM SERPL-SCNC: 138 MMOL/L
TRIGL SERPL-MCNC: 48 MG/DL
TSH SERPL DL<=0.005 MIU/L-ACNC: 1.36 UIU/ML
WBC # BLD AUTO: 5.8 K/UL

## 2017-12-21 PROCEDURE — 84443 ASSAY THYROID STIM HORMONE: CPT

## 2017-12-21 PROCEDURE — 82306 VITAMIN D 25 HYDROXY: CPT

## 2017-12-21 PROCEDURE — 85025 COMPLETE CBC W/AUTO DIFF WBC: CPT

## 2017-12-21 PROCEDURE — 83036 HEMOGLOBIN GLYCOSYLATED A1C: CPT

## 2017-12-21 PROCEDURE — 80053 COMPREHEN METABOLIC PANEL: CPT

## 2017-12-21 PROCEDURE — 36415 COLL VENOUS BLD VENIPUNCTURE: CPT | Mod: PO

## 2017-12-21 PROCEDURE — 80061 LIPID PANEL: CPT

## 2017-12-28 ENCOUNTER — OFFICE VISIT (OUTPATIENT)
Dept: INTERNAL MEDICINE | Facility: CLINIC | Age: 62
End: 2017-12-28
Payer: COMMERCIAL

## 2017-12-28 VITALS
SYSTOLIC BLOOD PRESSURE: 118 MMHG | WEIGHT: 183 LBS | TEMPERATURE: 96 F | HEIGHT: 61 IN | HEART RATE: 74 BPM | OXYGEN SATURATION: 98 % | BODY MASS INDEX: 34.55 KG/M2 | DIASTOLIC BLOOD PRESSURE: 68 MMHG

## 2017-12-28 DIAGNOSIS — F41.8 MIXED ANXIETY AND DEPRESSIVE DISORDER: ICD-10-CM

## 2017-12-28 DIAGNOSIS — E78.2 MIXED HYPERLIPIDEMIA: Chronic | ICD-10-CM

## 2017-12-28 DIAGNOSIS — K21.9 GASTROESOPHAGEAL REFLUX DISEASE WITHOUT ESOPHAGITIS: ICD-10-CM

## 2017-12-28 DIAGNOSIS — Z00.00 ENCOUNTER FOR PREVENTIVE HEALTH EXAMINATION: Primary | ICD-10-CM

## 2017-12-28 DIAGNOSIS — E55.9 VITAMIN D DEFICIENCY: ICD-10-CM

## 2017-12-28 PROCEDURE — 99396 PREV VISIT EST AGE 40-64: CPT | Mod: S$GLB,,, | Performed by: INTERNAL MEDICINE

## 2017-12-28 PROCEDURE — 99999 PR PBB SHADOW E&M-EST. PATIENT-LVL III: CPT | Mod: PBBFAC,,, | Performed by: INTERNAL MEDICINE

## 2017-12-28 NOTE — PROGRESS NOTES
"Subjective:       Patient ID: Mouna Chandra is a 62 y.o. female.    Chief Complaint: Follow-up    Here for f/u medical problems and preventive exam.  Active daily.  No f/c/sw/cough.  No cp/sob/palp.  Taking vit D weekly.  BMs normal.  Urine normal.    HM: 12/17 today fluvax, 3/17 [id number], 8/08 TDaP, 10/16 zostavax, 1/15 Cscope at 50yo rep due 10y, 9/17 BMD/MMG/ Gyn Dr. Odom, 1/18 sched Eye Dr. Tobin, 7/16 HCV neg.          Review of Systems   Constitutional: Negative for appetite change, chills, diaphoresis and fever.   HENT: Negative for congestion, ear pain, rhinorrhea, sinus pressure and sore throat.    Respiratory: Negative for cough, chest tightness and shortness of breath.    Cardiovascular: Negative for chest pain and palpitations.   Gastrointestinal: Negative for blood in stool, constipation, diarrhea, nausea and vomiting.   Genitourinary: Negative for dysuria, frequency, hematuria, menstrual problem, urgency and vaginal discharge.   Musculoskeletal: Negative for arthralgias.   Skin: Negative for rash.   Neurological: Negative for dizziness and headaches.   Psychiatric/Behavioral: Negative for sleep disturbance. The patient is not nervous/anxious.        Objective:   /68 (BP Location: Right arm, Patient Position: Sitting, BP Method: Medium (Manual))   Pulse 74   Temp 96.1 °F (35.6 °C) (Tympanic)   Ht 5' 1" (1.549 m)   Wt 83 kg (182 lb 15.7 oz)   SpO2 98%   BMI 34.57 kg/m²     Physical Exam   Constitutional: She is oriented to person, place, and time. She appears well-developed and well-nourished.   HENT:   Right Ear: External ear normal. Tympanic membrane is not injected.   Left Ear: External ear normal. Tympanic membrane is not injected.   Mouth/Throat: Oropharynx is clear and moist.   Eyes: Conjunctivae are normal.   Neck: Normal range of motion. Neck supple. No thyromegaly present.   Cardiovascular: Normal rate, regular rhythm and intact distal pulses.  Exam reveals no " gallop and no friction rub.    No murmur heard.  Pulmonary/Chest: Effort normal and breath sounds normal. She has no wheezes. She has no rales.   Abdominal: Soft. Bowel sounds are normal. She exhibits no mass. There is no tenderness.   Musculoskeletal: She exhibits no edema.   Lymphadenopathy:     She has no cervical adenopathy.   Neurological: She is alert and oriented to person, place, and time.   Skin: Skin is warm. No rash noted.   Psychiatric: She has a normal mood and affect.       Results for orders placed or performed in visit on 12/21/17   Comprehensive metabolic panel   Result Value Ref Range    Sodium 138 136 - 145 mmol/L    Potassium 4.0 3.5 - 5.1 mmol/L    Chloride 105 95 - 110 mmol/L    CO2 29 23 - 29 mmol/L    Glucose 100 70 - 110 mg/dL    BUN, Bld 19 8 - 23 mg/dL    Creatinine 0.8 0.5 - 1.4 mg/dL    Calcium 9.3 8.7 - 10.5 mg/dL    Total Protein 7.3 6.0 - 8.4 g/dL    Albumin 3.6 3.5 - 5.2 g/dL    Total Bilirubin 0.4 0.1 - 1.0 mg/dL    Alkaline Phosphatase 65 55 - 135 U/L    AST 19 10 - 40 U/L    ALT 16 10 - 44 U/L    Anion Gap 4 (L) 8 - 16 mmol/L    eGFR if African American >60.0 >60 mL/min/1.73 m^2    eGFR if non African American >60.0 >60 mL/min/1.73 m^2   Lipid panel   Result Value Ref Range    Cholesterol 149 120 - 199 mg/dL    Triglycerides 48 30 - 150 mg/dL    HDL 49 40 - 75 mg/dL    LDL Cholesterol 90.4 63.0 - 159.0 mg/dL    HDL/Chol Ratio 32.9 20.0 - 50.0 %    Total Cholesterol/HDL Ratio 3.0 2.0 - 5.0    Non-HDL Cholesterol 100 mg/dL   TSH   Result Value Ref Range    TSH 1.360 0.400 - 4.000 uIU/mL   CBC auto differential   Result Value Ref Range    WBC 5.80 3.90 - 12.70 K/uL    RBC 4.20 4.00 - 5.40 M/uL    Hemoglobin 12.4 12.0 - 16.0 g/dL    Hematocrit 37.5 37.0 - 48.5 %    MCV 89 82 - 98 fL    MCH 29.5 27.0 - 31.0 pg    MCHC 33.1 32.0 - 36.0 g/dL    RDW 13.0 11.5 - 14.5 %    Platelets 218 150 - 350 K/uL    MPV 9.9 9.2 - 12.9 fL    Immature Granulocytes 0.2 0.0 - 0.5 %    Gran # 3.6 1.8 - 7.7  K/uL    Immature Grans (Abs) 0.01 0.00 - 0.04 K/uL    Lymph # 1.5 1.0 - 4.8 K/uL    Mono # 0.4 0.3 - 1.0 K/uL    Eos # 0.2 0.0 - 0.5 K/uL    Baso # 0.06 0.00 - 0.20 K/uL    nRBC 0 0 /100 WBC    Gran% 62.8 38.0 - 73.0 %    Lymph% 25.7 18.0 - 48.0 %    Mono% 7.2 4.0 - 15.0 %    Eosinophil% 3.1 0.0 - 8.0 %    Basophil% 1.0 0.0 - 1.9 %    Differential Method Automated    Vitamin D   Result Value Ref Range    Vit D, 25-Hydroxy 28 (L) 30 - 96 ng/mL   Hemoglobin A1c   Result Value Ref Range    Hemoglobin A1C 5.1 4.0 - 5.6 %    Estimated Avg Glucose 100 68 - 131 mg/dL       Assessment:       1. Encounter for preventive health examination    2. Gastroesophageal reflux disease without esophagitis    3. Mixed anxiety and depressive disorder    4. Mixed hyperlipidemia    5. Vitamin D deficiency        Plan:       Mouna was seen today for follow-up.    Diagnoses and all orders for this visit:    Encounter for preventive health examination- fluvax.  utd.    Gastroesophageal reflux disease without esophagitis- stable on nexium.    Mixed anxiety and depressive disorder- doing well on rx, cont.    Mixed hyperlipidemia- cont lovastatin, 10yr vasc risk 2.8%.    Vitamin D deficiency- cont weekly rx.    RTC 6mo.

## 2018-01-02 ENCOUNTER — OFFICE VISIT (OUTPATIENT)
Dept: OPHTHALMOLOGY | Facility: CLINIC | Age: 63
End: 2018-01-02
Payer: COMMERCIAL

## 2018-01-02 DIAGNOSIS — H01.01A ULCERATIVE BLEPHARITIS OF UPPER AND LOWER EYELIDS OF BOTH EYES: ICD-10-CM

## 2018-01-02 DIAGNOSIS — H35.54: Primary | ICD-10-CM

## 2018-01-02 DIAGNOSIS — H01.01B ULCERATIVE BLEPHARITIS OF UPPER AND LOWER EYELIDS OF BOTH EYES: ICD-10-CM

## 2018-01-02 PROCEDURE — 92014 COMPRE OPH EXAM EST PT 1/>: CPT | Mod: S$GLB,,, | Performed by: OPHTHALMOLOGY

## 2018-01-02 PROCEDURE — 92134 CPTRZ OPH DX IMG PST SGM RTA: CPT | Mod: S$GLB,,, | Performed by: OPHTHALMOLOGY

## 2018-01-02 PROCEDURE — 99999 PR PBB SHADOW E&M-EST. PATIENT-LVL II: CPT | Mod: PBBFAC,,, | Performed by: OPHTHALMOLOGY

## 2018-01-02 RX ORDER — CIPROFLOXACIN HYDROCHLORIDE 3 MG/ML
1 SOLUTION/ DROPS OPHTHALMIC 3 TIMES DAILY
Qty: 5 ML | Refills: 0 | Status: SHIPPED | OUTPATIENT
Start: 2018-01-02 | End: 2018-01-16

## 2018-01-02 RX ORDER — PREDNISOLONE ACETATE 10 MG/ML
1 SUSPENSION/ DROPS OPHTHALMIC 3 TIMES DAILY
Qty: 5 ML | Refills: 0 | Status: SHIPPED | OUTPATIENT
Start: 2018-01-02 | End: 2018-01-16

## 2018-01-02 NOTE — PROGRESS NOTES
===============================  01/02/2018   Mouna Chandra,   62 y.o. female   Last visit [facility]: :12/30/2016   Last visit eye dept. Visit date not found  [facility]:  Corrected distance visual acuity was 20/25 in the right eye and 20/25 in the left eye.Corrected near visual acuity was J1 using both eyes.  Tonometry     Tonometry (Applanation, 8:29 AM)       Right Left    Pressure 16 16              Wearing Rx     Wearing Rx       Sphere Cylinder Axis Add    Right -5.25 +0.25 005 +2.25    Left -7.00 +0.50 165 +2.25    Type:  PAL              Manifest Refraction     Manifest Refraction       Sphere Cylinder [place] Dist VA    Right -5.25 +0.25 005 20/25    Left -7.00 +0.50 165 20/25              Chief Complaint   Patient presents with    Eye Exam     yearly exam / SMD     Ophthalmic Medications     Ophthalmic - Anti-inflammatory, Glucocorticoids Start End    prednisoLONE acetate (PRED FORTE) 1 % DrpS 1/2/2018 1/16/2018    Sig: Place 1 drop into both eyes 3 (three) times daily.    Route: Both Eyes         HPI     Eye Exam    Additional comments: yearly exam / SMD           Comments   Patient states VA seems stable. OD amsler fluctuates - sometimes spots are   darker. Floater fluctuates. OD byers.  No gtts. Has separate computer PAL at home. Happy with VA. Does Amsler   Grid every morning.        smd  Familial drusen       Last edited by Jesus Bruno MA on 1/2/2018  8:42 AM. 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  Oct drusen, no srf  1-2+ marginal blepharitis  erythema  Episodic blurring of vision  maxitrol drops tid in and on x 10 days - allergy to neosporin  So, rx for ciloxan gtts and pred ace tid  rtc 1 year    Artificial tear drops after ciloxan/pred ace  Then inb zaditor     ===========================

## 2018-02-26 DIAGNOSIS — E78.2 MIXED HYPERLIPIDEMIA: Chronic | ICD-10-CM

## 2018-02-27 RX ORDER — LOVASTATIN 40 MG/1
TABLET ORAL
Qty: 90 TABLET | Refills: 3 | Status: SHIPPED | OUTPATIENT
Start: 2018-02-27 | End: 2019-04-20 | Stop reason: SDUPTHER

## 2018-03-26 DIAGNOSIS — M51.9 LUMBAR DISC DISEASE: ICD-10-CM

## 2018-03-26 DIAGNOSIS — F51.01 PRIMARY INSOMNIA: ICD-10-CM

## 2018-03-26 RX ORDER — TRAZODONE HYDROCHLORIDE 50 MG/1
TABLET ORAL
Qty: 45 TABLET | Refills: 0 | Status: SHIPPED | OUTPATIENT
Start: 2018-03-26 | End: 2018-05-02 | Stop reason: SDUPTHER

## 2018-03-26 RX ORDER — PAROXETINE HYDROCHLORIDE 40 MG/1
TABLET, FILM COATED ORAL
Qty: 30 TABLET | Refills: 0 | Status: SHIPPED | OUTPATIENT
Start: 2018-03-26 | End: 2018-05-02 | Stop reason: SDUPTHER

## 2018-03-26 RX ORDER — BUPROPION HYDROCHLORIDE 100 MG/1
TABLET ORAL
Qty: 30 TABLET | Refills: 0 | Status: SHIPPED | OUTPATIENT
Start: 2018-03-26 | End: 2018-05-02 | Stop reason: SDUPTHER

## 2018-03-26 RX ORDER — METHOCARBAMOL 750 MG/1
TABLET, FILM COATED ORAL
Qty: 180 TABLET | Refills: 0 | Status: SHIPPED | OUTPATIENT
Start: 2018-03-26 | End: 2018-10-03 | Stop reason: SDUPTHER

## 2018-05-02 DIAGNOSIS — F51.01 PRIMARY INSOMNIA: ICD-10-CM

## 2018-05-02 DIAGNOSIS — E55.9 VITAMIN D DEFICIENCY: ICD-10-CM

## 2018-05-02 RX ORDER — ERGOCALCIFEROL 1.25 MG/1
CAPSULE ORAL
Qty: 24 CAPSULE | Refills: 11 | Status: SHIPPED | OUTPATIENT
Start: 2018-05-02 | End: 2019-07-08 | Stop reason: SDUPTHER

## 2018-05-02 RX ORDER — TRAZODONE HYDROCHLORIDE 50 MG/1
TABLET ORAL
Qty: 45 TABLET | Refills: 11 | Status: SHIPPED | OUTPATIENT
Start: 2018-05-02 | End: 2020-05-06 | Stop reason: SDUPTHER

## 2018-05-02 RX ORDER — BUPROPION HYDROCHLORIDE 100 MG/1
TABLET ORAL
Qty: 30 TABLET | Refills: 11 | Status: SHIPPED | OUTPATIENT
Start: 2018-05-02 | End: 2020-05-06

## 2018-05-02 RX ORDER — PAROXETINE HYDROCHLORIDE 40 MG/1
TABLET, FILM COATED ORAL
Qty: 30 TABLET | Refills: 11 | Status: SHIPPED | OUTPATIENT
Start: 2018-05-02 | End: 2020-05-06 | Stop reason: SDUPTHER

## 2018-08-22 ENCOUNTER — PATIENT MESSAGE (OUTPATIENT)
Dept: NEUROLOGY | Facility: CLINIC | Age: 63
End: 2018-08-22

## 2018-08-22 ENCOUNTER — TELEPHONE (OUTPATIENT)
Dept: NEUROLOGY | Facility: CLINIC | Age: 63
End: 2018-08-22

## 2018-08-22 NOTE — TELEPHONE ENCOUNTER
Wants referral to pain management for spine injections for back pain. Sent to dr hutton. 8/22/1822/18/1120/sf

## 2018-09-21 DIAGNOSIS — Z12.39 BREAST CANCER SCREENING: ICD-10-CM

## 2018-10-03 DIAGNOSIS — M51.9 LUMBAR DISC DISEASE: ICD-10-CM

## 2018-10-03 RX ORDER — METHOCARBAMOL 750 MG/1
TABLET, FILM COATED ORAL
Qty: 180 TABLET | Refills: 0 | Status: SHIPPED | OUTPATIENT
Start: 2018-10-03 | End: 2020-05-06

## 2019-04-19 DIAGNOSIS — E78.2 MIXED HYPERLIPIDEMIA: Chronic | ICD-10-CM

## 2019-04-20 RX ORDER — LOVASTATIN 40 MG/1
TABLET ORAL
Qty: 90 TABLET | Refills: 0 | Status: SHIPPED | OUTPATIENT
Start: 2019-04-20 | End: 2020-05-06 | Stop reason: SDUPTHER

## 2019-05-14 RX ORDER — PAROXETINE HYDROCHLORIDE 40 MG/1
TABLET, FILM COATED ORAL
Qty: 30 TABLET | Refills: 0 | OUTPATIENT
Start: 2019-05-14

## 2019-05-14 RX ORDER — BUPROPION HYDROCHLORIDE 100 MG/1
TABLET ORAL
Qty: 30 TABLET | Refills: 0 | OUTPATIENT
Start: 2019-05-14

## 2019-07-02 ENCOUNTER — OFFICE VISIT (OUTPATIENT)
Dept: OPHTHALMOLOGY | Facility: CLINIC | Age: 64
End: 2019-07-02

## 2019-07-02 ENCOUNTER — TELEPHONE (OUTPATIENT)
Dept: OPHTHALMOLOGY | Facility: CLINIC | Age: 64
End: 2019-07-02

## 2019-07-02 DIAGNOSIS — H43.812 POSTERIOR VITREOUS DETACHMENT OF LEFT EYE: Primary | ICD-10-CM

## 2019-07-02 DIAGNOSIS — H35.363 FAMILIAL DRUSEN OF BOTH EYES: ICD-10-CM

## 2019-07-02 PROCEDURE — 99999 PR PBB SHADOW E&M-EST. PATIENT-LVL II: ICD-10-PCS | Mod: PBBFAC,,, | Performed by: OPHTHALMOLOGY

## 2019-07-02 PROCEDURE — 92014 PR EYE EXAM, EST PATIENT,COMPREHESV: ICD-10-PCS | Mod: S$PBB,,, | Performed by: OPHTHALMOLOGY

## 2019-07-02 PROCEDURE — 92014 COMPRE OPH EXAM EST PT 1/>: CPT | Mod: S$PBB,,, | Performed by: OPHTHALMOLOGY

## 2019-07-02 PROCEDURE — 99212 OFFICE O/P EST SF 10 MIN: CPT | Mod: PBBFAC | Performed by: OPHTHALMOLOGY

## 2019-07-02 PROCEDURE — 99999 PR PBB SHADOW E&M-EST. PATIENT-LVL II: CPT | Mod: PBBFAC,,, | Performed by: OPHTHALMOLOGY

## 2019-07-02 RX ORDER — HYDROCODONE BITARTRATE AND ACETAMINOPHEN 10; 325 MG/1; MG/1
TABLET ORAL
Refills: 0 | COMMUNITY
Start: 2019-05-03 | End: 2020-05-06

## 2019-07-02 RX ORDER — TRIAZOLAM 0.25 MG/1
TABLET ORAL
Refills: 0 | COMMUNITY
Start: 2019-04-25 | End: 2020-05-06

## 2019-07-02 NOTE — PROGRESS NOTES
===============================  07/02/2019   Mouna Chandra,   64 y.o. female   Last visit [facility]: :1/2/2018   Last visit eye dept. Visit date not found  [facility]:  Corrected distance visual acuity was 20/25 in the right eye and 20/25 in the left eye.  Tonometry     Tonometry (Applanation, 11:54 AM)       Right Left    Pressure 14 13               Not recorded         Not recorded        Chief Complaint   Patient presents with    Spots and/or Floaters     seeing black dots/  started up last week        HPI     Spots and/or Floaters      Additional comments: seeing black dots/  started up last week              Comments     Familial drusen  blepharitis          Last edited by Dian Lowe on 7/2/2019 11:49 AM. (History)          ________________  7/2/2019  Problem List Items Addressed This Visit        Eye/Vision problems    Familial drusen of both eyes      Other Visit Diagnoses     Posterior vitreous detachment of left eye    -  Primary          .no holes or tears in retina  Reviewed s/s RT, RD  Instructed to call with any worsening  Familial drusen stable  rtc 1 year       ===========================

## 2019-07-02 NOTE — TELEPHONE ENCOUNTER
Spoke with pt.  She woke this a.m. With black spots that flashed into vision OS. She states that there is a veil that moves around in her vision OS.  Her vision in that eye is blurry.  Appt today with Dr. Tobin.

## 2019-07-02 NOTE — TELEPHONE ENCOUNTER
----- Message from Tiffany Junior sent at 7/2/2019  8:09 AM CDT -----  Contact: [phone]  Would like to consult with the nurse, patient states that's she is seeing a lot of black spots and blurriness, patient would like to speak with the nurse concerning this, please call back at [phone], thanks sj

## 2019-07-07 DIAGNOSIS — E55.9 VITAMIN D DEFICIENCY: ICD-10-CM

## 2019-07-08 RX ORDER — ERGOCALCIFEROL 1.25 MG/1
CAPSULE ORAL
Qty: 24 CAPSULE | Refills: 0 | Status: SHIPPED | OUTPATIENT
Start: 2019-07-08 | End: 2020-05-06

## 2019-10-28 ENCOUNTER — TELEPHONE (OUTPATIENT)
Dept: OPHTHALMOLOGY | Facility: CLINIC | Age: 64
End: 2019-10-28

## 2019-10-28 NOTE — TELEPHONE ENCOUNTER
----- Message from Kristel Veras sent at 10/28/2019  9:54 AM CDT -----  Contact: Pt  Pt is requesting call back in regards to having flash of black dots and spots in her eye.          Pls call back at [phone]

## 2019-11-22 DIAGNOSIS — Z12.39 BREAST CANCER SCREENING: ICD-10-CM

## 2020-04-20 ENCOUNTER — TELEPHONE (OUTPATIENT)
Dept: FAMILY MEDICINE | Facility: CLINIC | Age: 65
End: 2020-04-20

## 2020-04-20 DIAGNOSIS — Z29.9 PREVENTIVE MEASURE: Primary | ICD-10-CM

## 2020-04-20 NOTE — TELEPHONE ENCOUNTER
Pt requesting routine labs including lipids.  Has been off of meds x 1 year.  Pt is agreeable to virtual visit afterwards.  Please advise./rpr

## 2020-04-20 NOTE — TELEPHONE ENCOUNTER
----- Message from Nancy Galo sent at 4/20/2020  9:39 AM CDT -----  Contact: pt  States she needs to schedule some blood work done. Please call pt [phone]. Thank you

## 2020-04-21 ENCOUNTER — PATIENT MESSAGE (OUTPATIENT)
Dept: ADMINISTRATIVE | Facility: HOSPITAL | Age: 65
End: 2020-04-21

## 2020-04-22 ENCOUNTER — TELEPHONE (OUTPATIENT)
Dept: FAMILY MEDICINE | Facility: CLINIC | Age: 65
End: 2020-04-22

## 2020-04-22 NOTE — TELEPHONE ENCOUNTER
----- Message from Nancy Galo sent at 4/22/2020  8:45 AM CDT -----  Contact: [employer]  States she's calling regarding adding Dr Velez as her PCP. They need authorization. States please call the patient and then the patient has to call them back, so they can add her as her PCP. Please call pt [phone]. Thank you

## 2020-04-29 ENCOUNTER — LAB VISIT (OUTPATIENT)
Dept: LAB | Facility: HOSPITAL | Age: 65
End: 2020-04-29
Attending: INTERNAL MEDICINE
Payer: MEDICAID

## 2020-04-29 DIAGNOSIS — Z29.9 PREVENTIVE MEASURE: ICD-10-CM

## 2020-04-29 LAB
25(OH)D3+25(OH)D2 SERPL-MCNC: 23 NG/ML (ref 30–96)
ALBUMIN SERPL BCP-MCNC: 3.9 G/DL (ref 3.5–5.2)
ALP SERPL-CCNC: 72 U/L (ref 55–135)
ALT SERPL W/O P-5'-P-CCNC: 21 U/L (ref 10–44)
ANION GAP SERPL CALC-SCNC: 6 MMOL/L (ref 8–16)
AST SERPL-CCNC: 21 U/L (ref 10–40)
BASOPHILS # BLD AUTO: 0.07 K/UL (ref 0–0.2)
BASOPHILS NFR BLD: 1.1 % (ref 0–1.9)
BILIRUB SERPL-MCNC: 0.5 MG/DL (ref 0.1–1)
BUN SERPL-MCNC: 20 MG/DL (ref 8–23)
CALCIUM SERPL-MCNC: 9.4 MG/DL (ref 8.7–10.5)
CHLORIDE SERPL-SCNC: 106 MMOL/L (ref 95–110)
CHOLEST SERPL-MCNC: 221 MG/DL (ref 120–199)
CHOLEST/HDLC SERPL: 4.6 {RATIO} (ref 2–5)
CO2 SERPL-SCNC: 28 MMOL/L (ref 23–29)
CREAT SERPL-MCNC: 0.8 MG/DL (ref 0.5–1.4)
DIFFERENTIAL METHOD: NORMAL
EOSINOPHIL # BLD AUTO: 0.3 K/UL (ref 0–0.5)
EOSINOPHIL NFR BLD: 4 % (ref 0–8)
ERYTHROCYTE [DISTWIDTH] IN BLOOD BY AUTOMATED COUNT: 12.9 % (ref 11.5–14.5)
EST. GFR  (AFRICAN AMERICAN): >60 ML/MIN/1.73 M^2
EST. GFR  (NON AFRICAN AMERICAN): >60 ML/MIN/1.73 M^2
ESTIMATED AVG GLUCOSE: 108 MG/DL (ref 68–131)
GLUCOSE SERPL-MCNC: 98 MG/DL (ref 70–110)
HBA1C MFR BLD HPLC: 5.4 % (ref 4–5.6)
HCT VFR BLD AUTO: 41.9 % (ref 37–48.5)
HDLC SERPL-MCNC: 48 MG/DL (ref 40–75)
HDLC SERPL: 21.7 % (ref 20–50)
HGB BLD-MCNC: 13.4 G/DL (ref 12–16)
IMM GRANULOCYTES # BLD AUTO: 0.02 K/UL (ref 0–0.04)
IMM GRANULOCYTES NFR BLD AUTO: 0.3 % (ref 0–0.5)
LDLC SERPL CALC-MCNC: 156.6 MG/DL (ref 63–159)
LYMPHOCYTES # BLD AUTO: 2 K/UL (ref 1–4.8)
LYMPHOCYTES NFR BLD: 31.8 % (ref 18–48)
MCH RBC QN AUTO: 29.2 PG (ref 27–31)
MCHC RBC AUTO-ENTMCNC: 32 G/DL (ref 32–36)
MCV RBC AUTO: 91 FL (ref 82–98)
MONOCYTES # BLD AUTO: 0.5 K/UL (ref 0.3–1)
MONOCYTES NFR BLD: 7.6 % (ref 4–15)
NEUTROPHILS # BLD AUTO: 3.4 K/UL (ref 1.8–7.7)
NEUTROPHILS NFR BLD: 55.2 % (ref 38–73)
NONHDLC SERPL-MCNC: 173 MG/DL
NRBC BLD-RTO: 0 /100 WBC
PLATELET # BLD AUTO: 224 K/UL (ref 150–350)
PMV BLD AUTO: 9.5 FL (ref 9.2–12.9)
POTASSIUM SERPL-SCNC: 4.3 MMOL/L (ref 3.5–5.1)
PROT SERPL-MCNC: 7.6 G/DL (ref 6–8.4)
RBC # BLD AUTO: 4.59 M/UL (ref 4–5.4)
SODIUM SERPL-SCNC: 140 MMOL/L (ref 136–145)
TRIGL SERPL-MCNC: 82 MG/DL (ref 30–150)
TSH SERPL DL<=0.005 MIU/L-ACNC: 1.89 UIU/ML (ref 0.4–4)
WBC # BLD AUTO: 6.2 K/UL (ref 3.9–12.7)

## 2020-04-29 PROCEDURE — 80053 COMPREHEN METABOLIC PANEL: CPT

## 2020-04-29 PROCEDURE — 80061 LIPID PANEL: CPT

## 2020-04-29 PROCEDURE — 83036 HEMOGLOBIN GLYCOSYLATED A1C: CPT

## 2020-04-29 PROCEDURE — 85025 COMPLETE CBC W/AUTO DIFF WBC: CPT

## 2020-04-29 PROCEDURE — 82306 VITAMIN D 25 HYDROXY: CPT

## 2020-04-29 PROCEDURE — 36415 COLL VENOUS BLD VENIPUNCTURE: CPT | Mod: PO

## 2020-04-29 PROCEDURE — 84443 ASSAY THYROID STIM HORMONE: CPT

## 2020-05-01 ENCOUNTER — OFFICE VISIT (OUTPATIENT)
Dept: OPHTHALMOLOGY | Facility: CLINIC | Age: 65
End: 2020-05-01
Payer: MEDICAID

## 2020-05-01 DIAGNOSIS — H43.812 POSTERIOR VITREOUS DETACHMENT, LEFT: ICD-10-CM

## 2020-05-01 DIAGNOSIS — H10.32 ACUTE BACTERIAL CONJUNCTIVITIS OF LEFT EYE: ICD-10-CM

## 2020-05-01 DIAGNOSIS — H01.02B SQUAMOUS BLEPHARITIS OF UPPER AND LOWER EYELIDS OF BOTH EYES: Primary | ICD-10-CM

## 2020-05-01 DIAGNOSIS — H01.02A SQUAMOUS BLEPHARITIS OF UPPER AND LOWER EYELIDS OF BOTH EYES: Primary | ICD-10-CM

## 2020-05-01 PROCEDURE — 99999 PR PBB SHADOW E&M-EST. PATIENT-LVL II: ICD-10-PCS | Mod: PBBFAC,,, | Performed by: OPTOMETRIST

## 2020-05-01 PROCEDURE — 99999 PR PBB SHADOW E&M-EST. PATIENT-LVL II: CPT | Mod: PBBFAC,,, | Performed by: OPTOMETRIST

## 2020-05-01 PROCEDURE — 92014 PR EYE EXAM, EST PATIENT,COMPREHESV: ICD-10-PCS | Mod: S$PBB,,, | Performed by: OPTOMETRIST

## 2020-05-01 PROCEDURE — 99212 OFFICE O/P EST SF 10 MIN: CPT | Mod: PBBFAC | Performed by: OPTOMETRIST

## 2020-05-01 PROCEDURE — 92014 COMPRE OPH EXAM EST PT 1/>: CPT | Mod: S$PBB,,, | Performed by: OPTOMETRIST

## 2020-05-01 RX ORDER — MOXIFLOXACIN 5 MG/ML
1 SOLUTION/ DROPS OPHTHALMIC 3 TIMES DAILY
Qty: 3 ML | Refills: 0 | Status: SHIPPED | OUTPATIENT
Start: 2020-05-01 | End: 2020-05-08

## 2020-05-01 NOTE — PROGRESS NOTES
HPI     Pt c/o increase in floaters in her left eye for about 4 months. No   flashes of light.   No changes to vision  Redness OS x 3 months, no pain  Last visit 7/2/19 with C  PVD OS  Familial drusen OU    Last edited by Lulu Carlos MA on 5/1/2020  9:15 AM. (History)            Assessment /Plan     For exam results, see Encounter Report.    Squamous blepharitis of upper and lower eyelids of both eyes  Recommend starting lid scrubs daily with Sterilid    Acute bacterial conjunctivitis of left eye  -     moxifloxacin (VIGAMOX) 0.5 % ophthalmic solution; Place 1 drop into the left eye 3 (three) times daily. for 7 days  Dispense: 3 mL; Refill: 0    Posterior vitreous detachment, left  stable today, no tears, holes or RD   Signs and symptoms of retinal detachment were reviewed with patient  Return to clinic as soon as possible (same day) if you notice any new floaters, flashes of light, curtain/veil over your vision from any direction, or any change in vision.    Otherwise, RTC PRN

## 2020-05-04 NOTE — PROGRESS NOTES
Primary Care Telemedicine Note  The patient location is:  Patient Home   The chief complaint leading to consultation is: Follow up of medical problems.      Visit type: Virtual visit with synchronous audio only and video  Each patient to whom he or she provides medical services by telemedicine is:  (1) informed of the relationship between the physician and patient and the respective role of any other health care provider with respect to management of the patient; and (2) notified that he or she may decline to receive medical services by telemedicine and may withdraw from such care at any time.    Updated/ not seen since 12/17:  HM: 12/17 fluvax, 3/17 acavev10, 8/08 TDaP, 10/16 zostavax, 1/15 Cscope at 52yo rep due 10y, 9/17 BMD/MMG/ Gyn Dr. Odom, 1/18 Eye Dr. Tobin, 7/16 HCV neg.     HPI:  Having lots stress lately, with covid and boyfriend in [place].  Gets easily angry, sometimes palpitations with panic attack.  Off paxil and sleeping med x 1yr.  Active with job.  No f/c/sw/cough. Morning sinus drip.  No exertional cp/sob/palp.  BMs usually normal.  Urine normal.  No vit D.    Problem List Items Addressed This Visit     Allergic rhinitis    Relevant Medications    fluticasone propionate (FLONASE) 50 mcg/actuation nasal spray    Mixed anxiety and depressive disorder - Primary    Relevant Medications    paroxetine (PAXIL) 40 MG tablet    ALPRAZolam (XANAX) 0.25 MG tablet    Mixed hyperlipidemia (Chronic)    Relevant Medications    lovastatin (MEVACOR) 40 MG tablet    Other Relevant Orders    ALT (SGPT)    Lipid Panel    Panic attack    Relevant Medications    ALPRAZolam (XANAX) 0.25 MG tablet    Vitamin D deficiency    Relevant Medications    cholecalciferol, vitamin D3, (VITAMIN D3) 50 mcg (2,000 unit) Cap      Other Visit Diagnoses     Preventive measure        Primary insomnia        Relevant Medications    traZODone (DESYREL) 50 MG tablet    Anxiety and depression        Relevant Medications    paroxetine  (PAXIL) 40 MG tablet    ALPRAZolam (XANAX) 0.25 MG tablet    Anxiety              The patient's Health Maintenance was reviewed and the following appears to be due at this time:  Health Maintenance Due   Topic Date Due    DEXA SCAN  08/12/2015    Mammogram  09/08/2019       [unfilled]  Outpatient Medications Prior to Visit   Medication Sig Dispense Refill    ibuprofen (ADVIL,MOTRIN) 800 MG tablet Take 800 mg by mouth every 6 (six) hours as needed for Pain.      moxifloxacin (VIGAMOX) 0.5 % ophthalmic solution Place 1 drop into the left eye 3 (three) times daily. for 7 days 3 mL 0    albuterol 90 mcg/actuation inhaler Inhale 1-2 puffs into the lungs every 6 (six) hours as needed for Wheezing. 1 Inhaler 0    alprazolam (XANAX) 0.25 MG tablet Take 1 tablet (0.25 mg total) by mouth 3 (three) times daily as needed for Anxiety. 60 tablet 1    buPROPion (WELLBUTRIN) 100 MG tablet TAKE 1 TABLET(100 MG) BY MOUTH EVERY MORNING 30 tablet 11    ergocalciferol (ERGOCALCIFEROL) 50,000 unit Cap TAKE ONE CAPSULE BY MOUTH EVERY 7 DAYS 24 capsule 0    esomeprazole magnesium (NEXIUM 24HR) 22.3 mg CpDR Take 1 tablet by mouth once daily.      estradiol 0.05 mg/24 hr td ptsw (VIVELLE-DOT) 0.05 mg/24 hr Place 1 patch onto the skin twice a week.       estradiol 10 mcg Tab Place 1 tablet vaginally twice a week.      fluticasone (FLONASE) 50 mcg/actuation nasal spray TWO SPRAYS EACH NOSTRIL ONCE DAILY. 16 g 6    HYDROcodone-acetaminophen (NORCO)  mg per tablet TK 1 T PO Q 6 H PRN P  0    lovastatin (MEVACOR) 40 MG tablet TAKE 1 TABLET(40 MG) BY MOUTH EVERY EVENING 90 tablet 0    methocarbamol (ROBAXIN) 750 MG Tab TAKE 1 TABLET(750 MG) BY MOUTH FOUR TIMES DAILY 180 tablet 0    metoprolol tartrate (LOPRESSOR) 25 MG tablet TAKE 1 TABLET BY MOUTH TWICE DAILY 60 tablet 6    ondansetron (ZOFRAN) 4 MG tablet Take 1 tablet (4 mg total) by mouth every 8 (eight) hours as needed for Nausea. 12 tablet 0    paroxetine (PAXIL) 40  MG tablet TAKE 1 TABLET(40 MG) BY MOUTH EVERY MORNING 30 tablet 11    traZODone (DESYREL) 50 MG tablet TAKE 1 AND 1/2 TABLETS(75 MG) BY MOUTH EVERY EVENING 45 tablet 11    triazolam (HALCION) 0.25 MG Tab TAKE 1 TABLET BY MOUTH ONE HOUR PRIOR TO APPOINTMENT AND BRING ONE WITH YOU TO THE APPOINTMENT  0     No facility-administered medications prior to visit.         Physical Exam   No flowsheet data found.  No flowsheet data found.      Constitutional: The patient appears well-developed and well-nourished. No distress.   Psychiatric: The mood appears not anxious and the affect is not angry, not blunt, not labile and not inappropriate. Speech is not rapid and/or pressured, not delayed, not tangential and not slurred. The patient is not agitated, not aggressive, is not hyperactive, not slowed, not withdrawn, not actively hallucinating and not combative. Thought content is not paranoid and not delusional. Cognition and memory are not impaired. The patient does not express impulsivity or inappropriate judgment and does not exhibit a depressed mood. The patient expresses no homicidal and no suicidal ideation and has no suicidal plans and no homicidal plans. The patient is communicative and exhibits a normal recent memory and normal remote memory. The patient is attentive.     Results for orders placed or performed in visit on 04/29/20   CBC auto differential   Result Value Ref Range    WBC 6.20 3.90 - 12.70 K/uL    RBC 4.59 4.00 - 5.40 M/uL    Hemoglobin 13.4 12.0 - 16.0 g/dL    Hematocrit 41.9 37.0 - 48.5 %    Mean Corpuscular Volume 91 82 - 98 fL    Mean Corpuscular Hemoglobin 29.2 27.0 - 31.0 pg    Mean Corpuscular Hemoglobin Conc 32.0 32.0 - 36.0 g/dL    RDW 12.9 11.5 - 14.5 %    Platelets 224 150 - 350 K/uL    MPV 9.5 9.2 - 12.9 fL    Immature Granulocytes 0.3 0.0 - 0.5 %    Gran # (ANC) 3.4 1.8 - 7.7 K/uL    Immature Grans (Abs) 0.02 0.00 - 0.04 K/uL    Lymph # 2.0 1.0 - 4.8 K/uL    Mono # 0.5 0.3 - 1.0 K/uL    Eos  # 0.3 0.0 - 0.5 K/uL    Baso # 0.07 0.00 - 0.20 K/uL    nRBC 0 0 /100 WBC    Gran% 55.2 38.0 - 73.0 %    Lymph% 31.8 18.0 - 48.0 %    Mono% 7.6 4.0 - 15.0 %    Eosinophil% 4.0 0.0 - 8.0 %    Basophil% 1.1 0.0 - 1.9 %    Differential Method Automated    Comprehensive metabolic panel   Result Value Ref Range    Sodium 140 136 - 145 mmol/L    Potassium 4.3 3.5 - 5.1 mmol/L    Chloride 106 95 - 110 mmol/L    CO2 28 23 - 29 mmol/L    Glucose 98 70 - 110 mg/dL    BUN, Bld 20 8 - 23 mg/dL    Creatinine 0.8 0.5 - 1.4 mg/dL    Calcium 9.4 8.7 - 10.5 mg/dL    Total Protein 7.6 6.0 - 8.4 g/dL    Albumin 3.9 3.5 - 5.2 g/dL    Total Bilirubin 0.5 0.1 - 1.0 mg/dL    Alkaline Phosphatase 72 55 - 135 U/L    AST 21 10 - 40 U/L    ALT 21 10 - 44 U/L    Anion Gap 6 (L) 8 - 16 mmol/L    eGFR if African American >60.0 >60 mL/min/1.73 m^2    eGFR if non African American >60.0 >60 mL/min/1.73 m^2   Lipid panel   Result Value Ref Range    Cholesterol 221 (H) 120 - 199 mg/dL    Triglycerides 82 30 - 150 mg/dL    HDL 48 40 - 75 mg/dL    LDL Cholesterol 156.6 63.0 - 159.0 mg/dL    Hdl/Cholesterol Ratio 21.7 20.0 - 50.0 %    Total Cholesterol/HDL Ratio 4.6 2.0 - 5.0    Non-HDL Cholesterol 173 mg/dL   TSH   Result Value Ref Range    TSH 1.888 0.400 - 4.000 uIU/mL   Vitamin D   Result Value Ref Range    Vit D, 25-Hydroxy 23 (L) 30 - 96 ng/mL   Hemoglobin A1c   Result Value Ref Range    Hemoglobin A1C 5.4 4.0 - 5.6 %    Estimated Avg Glucose 108 68 - 131 mg/dL       Encounter Diagnoses   Name Primary?    Mixed anxiety and depressive disorder Yes    Mixed hyperlipidemia     Panic attack     Vitamin D deficiency     Preventive measure     Primary insomnia     Anxiety and depression     Anxiety     Seasonal allergic rhinitis due to pollen        PLAN:    I have discontinued Mouna Chandra's estradiol 0.05 mg/24 hr td ptsw, esomeprazole magnesium, albuterol, estradioL, ondansetron, metoprolol tartrate, buPROPion, methocarbamoL,  HYDROcodone-acetaminophen, triazolam, and ergocalciferol. I have also changed her ALPRAZolam and fluticasone propionate. Additionally, I am having her start on cholecalciferol (vitamin D3). Lastly, I am having her maintain her ibuprofen, moxifloxacin, paroxetine, traZODone, and lovastatin.    Diagnoses and all orders for this visit:    Mixed hyperlipidemia- restart statin, recheck 2mo.  -     ALT (SGPT); Future  -     Lipid Panel; Future  -     lovastatin (MEVACOR) 40 MG tablet; TAKE 1 TABLET(40 MG) BY MOUTH EVERY EVENING    Panic attack  -     ALPRAZolam (XANAX) 0.25 MG tablet; Take 1 tablet (0.25 mg total) by mouth 2 (two) times daily as needed for Anxiety.    Vitamin D deficiency-s tart daily supp.  -     cholecalciferol, vitamin D3, (VITAMIN D3) 50 mcg (2,000 unit) Cap; Take 1 capsule (2,000 Units total) by mouth once daily.    Preventive measure- physical in 2mo, with Tet.    Primary insomnia- restart rx.  -     traZODone (DESYREL) 50 MG tablet; TAKE 1 AND 1/2 TABLETS(75 MG) BY MOUTH EVERY EVENING    Anxiety and depression- start half tab paxil, xanax prn.  Increase to 40mg in 2 weeks.  RTC 2mo.  -     paroxetine (PAXIL) 40 MG tablet; TAKE 1 TABLET(40 MG) BY MOUTH EVERY MORNING  -     ALPRAZolam (XANAX) 0.25 MG tablet; Take 1 tablet (0.25 mg total) by mouth 2 (two) times daily as needed for Anxiety.    Seasonal allergic rhinitis due to pollen- start flonase daily.  -     fluticasone propionate (FLONASE) 50 mcg/actuation nasal spray; TWO SPRAYS EACH NOSTRIL ONCE DAILY.        Medications Ordered This Encounter   Medications    ALPRAZolam (XANAX) 0.25 MG tablet     Sig: Take 1 tablet (0.25 mg total) by mouth 2 (two) times daily as needed for Anxiety.     Dispense:  40 tablet     Refill:  1    cholecalciferol, vitamin D3, (VITAMIN D3) 50 mcg (2,000 unit) Cap     Sig: Take 1 capsule (2,000 Units total) by mouth once daily.     Dispense:  100 capsule     Refill:  6    fluticasone propionate (FLONASE) 50  mcg/actuation nasal spray     Sig: TWO SPRAYS EACH NOSTRIL ONCE DAILY.     Dispense:  16 g     Refill:  6    lovastatin (MEVACOR) 40 MG tablet     Sig: TAKE 1 TABLET(40 MG) BY MOUTH EVERY EVENING     Dispense:  90 tablet     Refill:  0    paroxetine (PAXIL) 40 MG tablet     Sig: TAKE 1 TABLET(40 MG) BY MOUTH EVERY MORNING     Dispense:  30 tablet     Refill:  11    traZODone (DESYREL) 50 MG tablet     Sig: TAKE 1 AND 1/2 TABLETS(75 MG) BY MOUTH EVERY EVENING     Dispense:  45 tablet     Refill:  11     Medications Ordered This Encounter   Medications    ALPRAZolam (XANAX) 0.25 MG tablet     Sig: Take 1 tablet (0.25 mg total) by mouth 2 (two) times daily as needed for Anxiety.     Dispense:  40 tablet     Refill:  1    cholecalciferol, vitamin D3, (VITAMIN D3) 50 mcg (2,000 unit) Cap     Sig: Take 1 capsule (2,000 Units total) by mouth once daily.     Dispense:  100 capsule     Refill:  6    fluticasone propionate (FLONASE) 50 mcg/actuation nasal spray     Sig: TWO SPRAYS EACH NOSTRIL ONCE DAILY.     Dispense:  16 g     Refill:  6    lovastatin (MEVACOR) 40 MG tablet     Sig: TAKE 1 TABLET(40 MG) BY MOUTH EVERY EVENING     Dispense:  90 tablet     Refill:  0    paroxetine (PAXIL) 40 MG tablet     Sig: TAKE 1 TABLET(40 MG) BY MOUTH EVERY MORNING     Dispense:  30 tablet     Refill:  11    traZODone (DESYREL) 50 MG tablet     Sig: TAKE 1 AND 1/2 TABLETS(75 MG) BY MOUTH EVERY EVENING     Dispense:  45 tablet     Refill:  11     Orders Placed This Encounter   Procedures    ALT (SGPT)     Standing Status:   Future     Standing Expiration Date:   7/5/2021    Lipid Panel     Standing Status:   Future     Standing Expiration Date:   5/6/2021

## 2020-05-06 ENCOUNTER — PATIENT MESSAGE (OUTPATIENT)
Dept: OPHTHALMOLOGY | Facility: CLINIC | Age: 65
End: 2020-05-06

## 2020-05-06 ENCOUNTER — OFFICE VISIT (OUTPATIENT)
Dept: FAMILY MEDICINE | Facility: CLINIC | Age: 65
End: 2020-05-06
Payer: MEDICAID

## 2020-05-06 DIAGNOSIS — J30.1 SEASONAL ALLERGIC RHINITIS DUE TO POLLEN: ICD-10-CM

## 2020-05-06 DIAGNOSIS — F32.A ANXIETY AND DEPRESSION: ICD-10-CM

## 2020-05-06 DIAGNOSIS — E78.2 MIXED HYPERLIPIDEMIA: Chronic | ICD-10-CM

## 2020-05-06 DIAGNOSIS — F51.01 PRIMARY INSOMNIA: ICD-10-CM

## 2020-05-06 DIAGNOSIS — F41.9 ANXIETY AND DEPRESSION: ICD-10-CM

## 2020-05-06 DIAGNOSIS — E55.9 VITAMIN D DEFICIENCY: ICD-10-CM

## 2020-05-06 DIAGNOSIS — F41.8 MIXED ANXIETY AND DEPRESSIVE DISORDER: Primary | ICD-10-CM

## 2020-05-06 DIAGNOSIS — Z29.9 PREVENTIVE MEASURE: ICD-10-CM

## 2020-05-06 DIAGNOSIS — F41.0 PANIC ATTACK: ICD-10-CM

## 2020-05-06 DIAGNOSIS — F41.9 ANXIETY: ICD-10-CM

## 2020-05-06 PROCEDURE — 99214 OFFICE O/P EST MOD 30 MIN: CPT | Mod: 95,,, | Performed by: INTERNAL MEDICINE

## 2020-05-06 PROCEDURE — 99214 PR OFFICE/OUTPT VISIT, EST, LEVL IV, 30-39 MIN: ICD-10-PCS | Mod: 95,,, | Performed by: INTERNAL MEDICINE

## 2020-05-06 RX ORDER — LOVASTATIN 40 MG/1
TABLET ORAL
Qty: 90 TABLET | Refills: 0 | Status: SHIPPED | OUTPATIENT
Start: 2020-05-06 | End: 2020-07-28 | Stop reason: SDUPTHER

## 2020-05-06 RX ORDER — ALPRAZOLAM 0.25 MG/1
0.25 TABLET ORAL 2 TIMES DAILY PRN
Qty: 40 TABLET | Refills: 1 | Status: SHIPPED | OUTPATIENT
Start: 2020-05-06 | End: 2021-03-29

## 2020-05-06 RX ORDER — ACETAMINOPHEN 500 MG
1 TABLET ORAL DAILY
Qty: 100 CAPSULE | Refills: 6 | Status: SHIPPED | OUTPATIENT
Start: 2020-05-06 | End: 2022-05-19 | Stop reason: SDUPTHER

## 2020-05-06 RX ORDER — TRAZODONE HYDROCHLORIDE 50 MG/1
TABLET ORAL
Qty: 45 TABLET | Refills: 11 | Status: SHIPPED | OUTPATIENT
Start: 2020-05-06 | End: 2021-04-28

## 2020-05-06 RX ORDER — FLUTICASONE PROPIONATE 50 MCG
SPRAY, SUSPENSION (ML) NASAL
Qty: 16 G | Refills: 6 | Status: SHIPPED | OUTPATIENT
Start: 2020-05-06 | End: 2021-03-29

## 2020-05-06 RX ORDER — PAROXETINE HYDROCHLORIDE 40 MG/1
TABLET, FILM COATED ORAL
Qty: 30 TABLET | Refills: 11 | Status: SHIPPED | OUTPATIENT
Start: 2020-05-06 | End: 2021-04-28

## 2020-05-07 ENCOUNTER — HOSPITAL ENCOUNTER (OUTPATIENT)
Dept: RADIOLOGY | Facility: HOSPITAL | Age: 65
Discharge: HOME | End: 2020-05-07
Attending: INTERNAL MEDICINE
Payer: MEDICAID

## 2020-05-07 VITALS — HEIGHT: 61 IN | BODY MASS INDEX: 34.55 KG/M2 | WEIGHT: 183 LBS

## 2020-05-07 DIAGNOSIS — Z12.39 BREAST CANCER SCREENING: ICD-10-CM

## 2020-05-07 PROCEDURE — 77067 SCR MAMMO BI INCL CAD: CPT | Mod: 26,,, | Performed by: RADIOLOGY

## 2020-05-07 PROCEDURE — 77067 SCR MAMMO BI INCL CAD: CPT | Mod: TC

## 2020-05-07 PROCEDURE — 77067 MAMMO DIGITAL SCREENING BILAT WITH TOMOSYNTHESIS_CAD: ICD-10-PCS | Mod: 26,,, | Performed by: RADIOLOGY

## 2020-05-07 PROCEDURE — 77063 BREAST TOMOSYNTHESIS BI: CPT | Mod: 26,,, | Performed by: RADIOLOGY

## 2020-05-07 PROCEDURE — 77063 MAMMO DIGITAL SCREENING BILAT WITH TOMOSYNTHESIS_CAD: ICD-10-PCS | Mod: 26,,, | Performed by: RADIOLOGY

## 2020-05-11 ENCOUNTER — TELEPHONE (OUTPATIENT)
Dept: OPHTHALMOLOGY | Facility: CLINIC | Age: 65
End: 2020-05-11

## 2020-06-09 ENCOUNTER — OFFICE VISIT (OUTPATIENT)
Dept: OPHTHALMOLOGY | Facility: CLINIC | Age: 65
End: 2020-06-09
Payer: MEDICAID

## 2020-06-09 DIAGNOSIS — H35.3133 ADVANCED ATROPHIC NONEXUDATIVE AGE-RELATED MACULAR DEGENERATION OF BOTH EYES WITHOUT SUBFOVEAL INVOLVEMENT: ICD-10-CM

## 2020-06-09 DIAGNOSIS — H35.363 FAMILIAL DRUSEN OF BOTH EYES: Primary | ICD-10-CM

## 2020-06-09 PROCEDURE — 99999 PR PBB SHADOW E&M-EST. PATIENT-LVL II: ICD-10-PCS | Mod: PBBFAC,,, | Performed by: OPHTHALMOLOGY

## 2020-06-09 PROCEDURE — 92014 PR EYE EXAM, EST PATIENT,COMPREHESV: ICD-10-PCS | Mod: S$PBB,,, | Performed by: OPHTHALMOLOGY

## 2020-06-09 PROCEDURE — 92134 CPTRZ OPH DX IMG PST SGM RTA: CPT | Mod: PBBFAC | Performed by: OPHTHALMOLOGY

## 2020-06-09 PROCEDURE — 99999 PR PBB SHADOW E&M-EST. PATIENT-LVL II: CPT | Mod: PBBFAC,,, | Performed by: OPHTHALMOLOGY

## 2020-06-09 PROCEDURE — 92134 POSTERIOR SEGMENT OCT RETINA (OCULAR COHERENCE TOMOGRAPHY)-BOTH EYES: ICD-10-PCS | Mod: 26,S$PBB,, | Performed by: OPHTHALMOLOGY

## 2020-06-09 PROCEDURE — 99212 OFFICE O/P EST SF 10 MIN: CPT | Mod: PBBFAC,25 | Performed by: OPHTHALMOLOGY

## 2020-06-09 PROCEDURE — 92014 COMPRE OPH EXAM EST PT 1/>: CPT | Mod: S$PBB,,, | Performed by: OPHTHALMOLOGY

## 2020-06-09 RX ORDER — MOXIFLOXACIN 5 MG/ML
SOLUTION/ DROPS OPHTHALMIC
COMMUNITY
Start: 2020-05-09 | End: 2021-05-25

## 2020-06-09 NOTE — PROGRESS NOTES
===============================  Mouna Vaca Chandra,  6/9/2020 today   65 y.o. female   Last visit [facility]: :7/2/2019   Last visit eye dept. 5/1/2020  VA:  Corrected distance visual acuity was 20/25 in the right eye and 20/25 in the left eye.  Tonometry     Tonometry (Applanation, 8:52 AM)       Right Left    Pressure 14 14              Wearing Rx     Wearing Rx       Sphere Cylinder Axis Add    Right -5.25 +0.25 005 +2.25    Left -7.00 +0.50 165 +2.25    Type:  PAL              Manifest Refraction     Manifest Refraction       Sphere Cylinder Axis Add    Right -5.25 +0.25 005 +2.25    Left -7.00 +0.50 165 +2.25               Not recorded        Chief Complaint   Patient presents with    Spots and/or Floaters     yearly check up    Macular Degeneration       ________________  6/9/2020 today  HPI     Spots and/or Floaters      Additional comments: yearly check up              Comments     Familial drusen  blepharitis          Last edited by Dian Lowe on 6/9/2020  8:37 AM. 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      ===========================

## 2020-06-30 ENCOUNTER — LAB VISIT (OUTPATIENT)
Dept: LAB | Facility: HOSPITAL | Age: 65
End: 2020-06-30
Attending: INTERNAL MEDICINE
Payer: MEDICAID

## 2020-06-30 DIAGNOSIS — E78.2 MIXED HYPERLIPIDEMIA: Chronic | ICD-10-CM

## 2020-06-30 LAB
ALT SERPL W/O P-5'-P-CCNC: 13 U/L (ref 10–44)
CHOLEST SERPL-MCNC: 164 MG/DL (ref 120–199)
CHOLEST/HDLC SERPL: 3.4 {RATIO} (ref 2–5)
HDLC SERPL-MCNC: 48 MG/DL (ref 40–75)
HDLC SERPL: 29.3 % (ref 20–50)
LDLC SERPL CALC-MCNC: 98.8 MG/DL (ref 63–159)
NONHDLC SERPL-MCNC: 116 MG/DL
TRIGL SERPL-MCNC: 86 MG/DL (ref 30–150)

## 2020-06-30 PROCEDURE — 36415 COLL VENOUS BLD VENIPUNCTURE: CPT | Mod: PO

## 2020-06-30 PROCEDURE — 80061 LIPID PANEL: CPT

## 2020-06-30 PROCEDURE — 84460 ALANINE AMINO (ALT) (SGPT): CPT

## 2020-07-02 NOTE — PROGRESS NOTES
Subjective:      Patient ID: Mouna Chandra is a 65 y.o. female.    Chief Complaint: No chief complaint on file.      HPI  Here for f/u medical problems and preventive exam.  Brother with Crohns.  Active with walking.  Energy good.  Staying safe distance, due to covid.  No f/c/sw/cough.  No cp/sob.  Occas palpitations, used to have a prn med.  Anxiety doing well on higher dose paxil, only 1 xanax q 3 weeks.  Tolerating lovastatin.  Taking vit D.    HM: 12/17 fluvax, 3/17 [id number], 11/16 TDaP, 10/16 zostavax, 1/15 Cscope rep due 10y, 5/20 MMG/me, 1/18 Eye Dr. Tobin, 7/16 HCV neg.     Review of Systems   Constitutional: Negative for activity change, appetite change, chills, diaphoresis, fever and unexpected weight change.   HENT: Positive for hearing loss. Negative for congestion, ear pain, rhinorrhea, sinus pressure, sore throat and trouble swallowing.    Eyes: Positive for visual disturbance. Negative for discharge.   Respiratory: Negative for cough, chest tightness, shortness of breath and wheezing.    Cardiovascular: Positive for palpitations. Negative for chest pain.   Gastrointestinal: Positive for constipation and diarrhea. Negative for blood in stool, nausea and vomiting.   Endocrine: Negative for polydipsia and polyuria.   Genitourinary: Negative for difficulty urinating, dysuria, frequency, hematuria, menstrual problem, urgency and vaginal discharge.   Musculoskeletal: Positive for arthralgias and neck pain. Negative for joint swelling.   Skin: Negative for rash.   Neurological: Positive for weakness and headaches. Negative for dizziness.   Psychiatric/Behavioral: Positive for dysphoric mood. Negative for confusion and sleep disturbance. The patient is not nervous/anxious.          Objective:   There were no vitals taken for this visit.    Physical Exam  Constitutional:       Appearance: She is well-developed.   HENT:      Right Ear: External ear normal. Tympanic membrane is not injected.       Left Ear: External ear normal. Tympanic membrane is not injected.   Eyes:      Conjunctiva/sclera: Conjunctivae normal.   Neck:      Musculoskeletal: Normal range of motion and neck supple.      Thyroid: No thyromegaly.   Cardiovascular:      Rate and Rhythm: Normal rate and regular rhythm.      Heart sounds: No murmur. No friction rub. No gallop.    Pulmonary:      Effort: Pulmonary effort is normal.      Breath sounds: Normal breath sounds. No wheezing or rales.   Chest:      Breasts:         Right: No mass, nipple discharge, skin change or tenderness.         Left: No mass, nipple discharge, skin change or tenderness.   Abdominal:      General: Bowel sounds are normal.      Palpations: Abdomen is soft. There is no mass.      Tenderness: There is no abdominal tenderness.   Lymphadenopathy:      Cervical: No cervical adenopathy.   Skin:     General: Skin is warm.      Findings: No rash.   Neurological:      Mental Status: She is alert and oriented to person, place, and time.       Results for PATRIZIA EVNEGAS (MRN [id number]) as of 7/7/2020 13:24   Ref. Range 4/29/2020 08:21 6/30/2020 08:08   WBC Latest Ref Range: 3.90 - 12.70 K/uL 6.20    RBC Latest Ref Range: 4.00 - 5.40 M/uL 4.59    Hemoglobin Latest Ref Range: 12.0 - 16.0 g/dL 13.4    Hematocrit Latest Ref Range: 37.0 - 48.5 % 41.9    MCV Latest Ref Range: 82 - 98 fL 91    MCH Latest Ref Range: 27.0 - 31.0 pg 29.2    MCHC Latest Ref Range: 32.0 - 36.0 g/dL 32.0    RDW Latest Ref Range: 11.5 - 14.5 % 12.9    Platelets Latest Ref Range: 150 - 350 K/uL 224    MPV Latest Ref Range: 9.2 - 12.9 fL 9.5    Gran% Latest Ref Range: 38.0 - 73.0 % 55.2    Gran # (ANC) Latest Ref Range: 1.8 - 7.7 K/uL 3.4    Lymph% Latest Ref Range: 18.0 - 48.0 % 31.8    Lymph # Latest Ref Range: 1.0 - 4.8 K/uL 2.0    Mono% Latest Ref Range: 4.0 - 15.0 % 7.6    Mono # Latest Ref Range: 0.3 - 1.0 K/uL 0.5    Eosinophil% Latest Ref Range: 0.0 - 8.0 % 4.0    Eos # Latest Ref Range:  0.0 - 0.5 K/uL 0.3    Basophil% Latest Ref Range: 0.0 - 1.9 % 1.1    Baso # Latest Ref Range: 0.00 - 0.20 K/uL 0.07    nRBC Latest Ref Range: 0 /100 WBC 0    Differential Method Unknown Automated    Immature Grans (Abs) Latest Ref Range: 0.00 - 0.04 K/uL 0.02    Immature Granulocytes Latest Ref Range: 0.0 - 0.5 % 0.3    Sodium Latest Ref Range: 136 - 145 mmol/L 140    Potassium Latest Ref Range: 3.5 - 5.1 mmol/L 4.3    Chloride Latest Ref Range: 95 - 110 mmol/L 106    CO2 Latest Ref Range: 23 - 29 mmol/L 28    Anion Gap Latest Ref Range: 8 - 16 mmol/L 6 (L)    BUN, Bld Latest Ref Range: 8 - 23 mg/dL 20    Creatinine Latest Ref Range: 0.5 - 1.4 mg/dL 0.8    eGFR if non African American Latest Ref Range: >60 mL/min/1.73 m^2 >60.0    eGFR if African American Latest Ref Range: >60 mL/min/1.73 m^2 >60.0    Glucose Latest Ref Range: 70 - 110 mg/dL 98    Calcium Latest Ref Range: 8.7 - 10.5 mg/dL 9.4    Alkaline Phosphatase Latest Ref Range: 55 - 135 U/L 72    PROTEIN TOTAL Latest Ref Range: 6.0 - 8.4 g/dL 7.6    Albumin Latest Ref Range: 3.5 - 5.2 g/dL 3.9    BILIRUBIN TOTAL Latest Ref Range: 0.1 - 1.0 mg/dL 0.5    AST Latest Ref Range: 10 - 40 U/L 21    ALT Latest Ref Range: 10 - 44 U/L 21 13   Triglycerides Latest Ref Range: 30 - 150 mg/dL 82 86   Cholesterol Latest Ref Range: 120 - 199 mg/dL 221 (H) 164   HDL Latest Ref Range: 40 - 75 mg/dL 48 48   Hdl/Cholesterol Ratio Latest Ref Range: 20.0 - 50.0 % 21.7 29.3   LDL Cholesterol External Latest Ref Range: 63.0 - 159.0 mg/dL 156.6 98.8   Non-HDL Cholesterol Latest Units: mg/dL 173 116   Total Cholesterol/HDL Ratio Latest Ref Range: 2.0 - 5.0  4.6 3.4   Vit D, 25-Hydroxy Latest Ref Range: 30 - 96 ng/mL 23 (L)    Hemoglobin A1C External Latest Ref Range: 4.0 - 5.6 % 5.4    Estimated Avg Glucose Latest Ref Range: 68 - 131 mg/dL 108    TSH Latest Ref Range: 0.400 - 4.000 uIU/mL 1.888          Assessment:       1. Encounter for preventive health examination    2. Vitamin D  deficiency    3. Mixed anxiety and depressive disorder    4. Mixed hyperlipidemia    5. Gastroesophageal reflux disease without esophagitis    6. Palpitation    7. Asymptomatic postmenopausal state          Plan:     Encounter for preventive health examination- sched BMD.  Discussed pt needs to get Shingrix vaccination at pharmacy.    Vitamin D deficiency- cont supplement.    Mixed anxiety and depressive disorder- doing well, cont rx.    Mixed hyperlipidemia- much improved, cont statin.    Gastroesophageal reflux disease without esophagitis    Palpitation  -     Ambulatory referral/consult to Cardiology; Future; Expected date: 07/14/2020    Asymptomatic postmenopausal state  -     DXA Bone Density Spine And Hip; Future; Expected date: 07/07/2020    RTC 6mo.

## 2020-07-07 ENCOUNTER — OFFICE VISIT (OUTPATIENT)
Dept: FAMILY MEDICINE | Facility: CLINIC | Age: 65
End: 2020-07-07
Payer: MEDICAID

## 2020-07-07 DIAGNOSIS — F41.8 MIXED ANXIETY AND DEPRESSIVE DISORDER: ICD-10-CM

## 2020-07-07 DIAGNOSIS — E55.9 VITAMIN D DEFICIENCY: ICD-10-CM

## 2020-07-07 DIAGNOSIS — Z00.00 ENCOUNTER FOR PREVENTIVE HEALTH EXAMINATION: Primary | ICD-10-CM

## 2020-07-07 DIAGNOSIS — Z78.0 ASYMPTOMATIC POSTMENOPAUSAL STATE: ICD-10-CM

## 2020-07-07 DIAGNOSIS — K21.9 GASTROESOPHAGEAL REFLUX DISEASE WITHOUT ESOPHAGITIS: ICD-10-CM

## 2020-07-07 DIAGNOSIS — E78.2 MIXED HYPERLIPIDEMIA: Chronic | ICD-10-CM

## 2020-07-07 DIAGNOSIS — R00.2 PALPITATION: ICD-10-CM

## 2020-07-07 PROCEDURE — 99396 PR PREVENTIVE VISIT,EST,40-64: ICD-10-PCS | Mod: S$PBB,,, | Performed by: INTERNAL MEDICINE

## 2020-07-07 PROCEDURE — 99213 OFFICE O/P EST LOW 20 MIN: CPT | Mod: PBBFAC,PO | Performed by: INTERNAL MEDICINE

## 2020-07-07 PROCEDURE — 99999 PR PBB SHADOW E&M-EST. PATIENT-LVL III: ICD-10-PCS | Mod: PBBFAC,,, | Performed by: INTERNAL MEDICINE

## 2020-07-07 PROCEDURE — 99999 PR PBB SHADOW E&M-EST. PATIENT-LVL III: CPT | Mod: PBBFAC,,, | Performed by: INTERNAL MEDICINE

## 2020-07-07 PROCEDURE — 99396 PREV VISIT EST AGE 40-64: CPT | Mod: S$PBB,,, | Performed by: INTERNAL MEDICINE

## 2020-07-23 ENCOUNTER — APPOINTMENT (OUTPATIENT)
Dept: RADIOLOGY | Facility: HOSPITAL | Age: 65
End: 2020-07-23
Attending: INTERNAL MEDICINE
Payer: MEDICAID

## 2020-07-23 ENCOUNTER — PATIENT MESSAGE (OUTPATIENT)
Dept: FAMILY MEDICINE | Facility: CLINIC | Age: 65
End: 2020-07-23

## 2020-07-23 DIAGNOSIS — Z78.0 ASYMPTOMATIC POSTMENOPAUSAL STATE: ICD-10-CM

## 2020-07-23 PROCEDURE — 77080 DEXA BONE DENSITY SPINE HIP: ICD-10-PCS | Mod: 26,,, | Performed by: RADIOLOGY

## 2020-07-23 PROCEDURE — 77080 DXA BONE DENSITY AXIAL: CPT | Mod: 26,,, | Performed by: RADIOLOGY

## 2020-07-23 PROCEDURE — 77080 DXA BONE DENSITY AXIAL: CPT | Mod: TC

## 2020-07-28 DIAGNOSIS — E78.2 MIXED HYPERLIPIDEMIA: Chronic | ICD-10-CM

## 2020-07-29 RX ORDER — LOVASTATIN 40 MG/1
TABLET ORAL
Qty: 90 TABLET | Refills: 3 | Status: SHIPPED | OUTPATIENT
Start: 2020-07-29 | End: 2021-07-19 | Stop reason: SDUPTHER

## 2020-07-31 ENCOUNTER — LAB VISIT (OUTPATIENT)
Dept: INTERNAL MEDICINE | Facility: CLINIC | Age: 65
End: 2020-07-31
Payer: MEDICAID

## 2020-07-31 DIAGNOSIS — Z29.9 PREVENTIVE MEASURE: ICD-10-CM

## 2020-07-31 PROCEDURE — U0003 INFECTIOUS AGENT DETECTION BY NUCLEIC ACID (DNA OR RNA); SEVERE ACUTE RESPIRATORY SYNDROME CORONAVIRUS 2 (SARS-COV-2) (CORONAVIRUS DISEASE [COVID-19]), AMPLIFIED PROBE TECHNIQUE, MAKING USE OF HIGH THROUGHPUT TECHNOLOGIES AS DESCRIBED BY CMS-2020-01-R: HCPCS

## 2020-08-02 DIAGNOSIS — U07.1 COVID-19 VIRUS DETECTED: ICD-10-CM

## 2020-08-02 LAB — SARS-COV-2 RNA RESP QL NAA+PROBE: DETECTED

## 2020-08-04 ENCOUNTER — NURSE TRIAGE (OUTPATIENT)
Dept: ADMINISTRATIVE | Facility: CLINIC | Age: 65
End: 2020-08-04

## 2020-08-04 NOTE — TELEPHONE ENCOUNTER
Pt reports mild occasional chest pain that is intermittent and does not last long.  Pt able to talk, laugh with no present complaints of chest pain or discomfort and pt denies any SOB.  Advised pt to take Tylenol and Motrin, alternating between the two as she has been doing.  Pt also reports loss of taste and smell.    Advised pt to continue to closely monitor symptoms and if symptoms worsen or new symptoms present, go to the ED, Urgent Care Clinic or call back to the On-call line if needed or to her [facility] PCP.    Pt verbalized understanding.  Reason for Disposition   [1] COVID-19 diagnosed by positive lab test AND [2] mild symptoms (e.g., cough, fever, others) AND [3] no complications or SOB    Additional Information   Negative: SEVERE difficulty breathing (e.g., struggling for each breath, speaks in single words)   Negative: Difficult to awaken or acting confused (e.g., disoriented, slurred speech)   Negative: Bluish (or gray) lips or face now   Negative: Shock suspected (e.g., cold/pale/clammy skin, too weak to stand, low BP, rapid pulse)   Negative: Sounds like a life-threatening emergency to the triager   Negative: [1] COVID-19 exposure AND [2] NO symptoms   Negative: COVID-19 and Breastfeeding, questions about   Negative: [1] Adult with possible COVID-19 symptoms AND [2] triager concerned about severity of symptoms or other causes   Negative: SEVERE or constant chest pain or pressure (Exception: mild central chest pain, present only when coughing)   Negative: MODERATE difficulty breathing (e.g., speaks in phrases, SOB even at rest, pulse 100-120)   Negative: MILD difficulty breathing (e.g., minimal/no SOB at rest, SOB with walking, pulse <100)   Negative: Chest pain   Negative: Patient sounds very sick or weak to the triager   Negative: Fever > 103 F (39.4 C)   Negative: [1] Fever > 101 F (38.3 C) AND [2] age > 60   Negative: [1] Fever > 100.0 F (37.8 C) AND [2] bedridden (e.g., nursing home  patient, CVA, chronic illness, recovering from surgery)   Negative: HIGH RISK patient (e.g., age > 64 years, diabetes, heart or lung disease, weak immune system)   Negative: [1] COVID-19 infection suspected by caller or triager AND [2] mild symptoms (cough, fever, or others) AND [3] no complications or SOB   Negative: Fever present > 3 days (72 hours)   Negative: [1] Fever returns after gone for over 24 hours AND [2] symptoms worse or not improved   Negative: [1] Continuous (nonstop) coughing interferes with work or school AND [2] no improvement using cough treatment per protocol   Negative: Cough present > 3 weeks   Negative: [1] COVID-19 diagnosed by HCP (doctor, NP or PA) AND [2] mild symptoms (e.g., cough, fever, others) AND [3] no complications or SOB   Negative: COVID-19 Home Isolation, questions about   Negative: COVID-19 Testing, questions about   Negative: COVID-19 Prevention and Healthy Living, questions about   Negative: COVID-19, questions about    Protocols used: CORONAVIRUS (COVID-19) DIAGNOSED OR [id number]

## 2020-08-10 ENCOUNTER — PATIENT MESSAGE (OUTPATIENT)
Dept: FAMILY MEDICINE | Facility: CLINIC | Age: 65
End: 2020-08-10

## 2020-08-10 DIAGNOSIS — Z29.9 PREVENTIVE MEASURE: Primary | ICD-10-CM

## 2020-08-27 ENCOUNTER — PATIENT MESSAGE (OUTPATIENT)
Dept: FAMILY MEDICINE | Facility: CLINIC | Age: 65
End: 2020-08-27

## 2020-08-27 RX ORDER — DICYCLOMINE HYDROCHLORIDE 10 MG/1
10 CAPSULE ORAL
Qty: 60 CAPSULE | Refills: 3 | Status: SHIPPED | OUTPATIENT
Start: 2020-08-27 | End: 2020-09-26

## 2020-08-27 NOTE — TELEPHONE ENCOUNTER
Please see patient's mychart message and advise. Patient stated she is still having the headaches.

## 2020-09-03 ENCOUNTER — PATIENT OUTREACH (OUTPATIENT)
Dept: ADMINISTRATIVE | Facility: OTHER | Age: 65
End: 2020-09-03

## 2020-09-03 DIAGNOSIS — R00.2 PALPITATIONS: Primary | ICD-10-CM

## 2020-09-04 ENCOUNTER — HOSPITAL ENCOUNTER (OUTPATIENT)
Dept: CARDIOLOGY | Facility: HOSPITAL | Age: 65
Discharge: HOME | End: 2020-09-04
Attending: INTERNAL MEDICINE
Payer: MEDICAID

## 2020-09-04 ENCOUNTER — OFFICE VISIT (OUTPATIENT)
Dept: CARDIOLOGY | Facility: CLINIC | Age: 65
End: 2020-09-04
Payer: MEDICAID

## 2020-09-04 VITALS
WEIGHT: 183 LBS | OXYGEN SATURATION: 99 % | DIASTOLIC BLOOD PRESSURE: 78 MMHG | SYSTOLIC BLOOD PRESSURE: 126 MMHG | BODY MASS INDEX: 34.55 KG/M2 | HEART RATE: 69 BPM | HEIGHT: 61 IN

## 2020-09-04 DIAGNOSIS — E78.2 MIXED HYPERLIPIDEMIA: Chronic | ICD-10-CM

## 2020-09-04 DIAGNOSIS — R07.9 CHEST PAIN SYNDROME: ICD-10-CM

## 2020-09-04 DIAGNOSIS — F41.8 MIXED ANXIETY AND DEPRESSIVE DISORDER: ICD-10-CM

## 2020-09-04 DIAGNOSIS — R00.2 PALPITATION: Primary | ICD-10-CM

## 2020-09-04 DIAGNOSIS — K21.9 GASTROESOPHAGEAL REFLUX DISEASE WITHOUT ESOPHAGITIS: ICD-10-CM

## 2020-09-04 DIAGNOSIS — R00.2 PALPITATIONS: ICD-10-CM

## 2020-09-04 DIAGNOSIS — E55.9 VITAMIN D DEFICIENCY: ICD-10-CM

## 2020-09-04 PROCEDURE — 93005 ELECTROCARDIOGRAM TRACING: CPT

## 2020-09-04 PROCEDURE — 93010 ELECTROCARDIOGRAM REPORT: CPT | Mod: ,,, | Performed by: INTERNAL MEDICINE

## 2020-09-04 PROCEDURE — 99204 PR OFFICE/OUTPT VISIT, NEW, LEVL IV, 45-59 MIN: ICD-10-PCS | Mod: S$PBB,,, | Performed by: INTERNAL MEDICINE

## 2020-09-04 PROCEDURE — 99999 PR PBB SHADOW E&M-EST. PATIENT-LVL IV: ICD-10-PCS | Mod: PBBFAC,,, | Performed by: INTERNAL MEDICINE

## 2020-09-04 PROCEDURE — 99214 OFFICE O/P EST MOD 30 MIN: CPT | Mod: PBBFAC | Performed by: INTERNAL MEDICINE

## 2020-09-04 PROCEDURE — 99999 PR PBB SHADOW E&M-EST. PATIENT-LVL IV: CPT | Mod: PBBFAC,,, | Performed by: INTERNAL MEDICINE

## 2020-09-04 PROCEDURE — 99204 OFFICE O/P NEW MOD 45 MIN: CPT | Mod: S$PBB,,, | Performed by: INTERNAL MEDICINE

## 2020-09-04 PROCEDURE — 93010 EKG 12-LEAD: ICD-10-PCS | Mod: ,,, | Performed by: INTERNAL MEDICINE

## 2020-09-04 NOTE — LETTER
September 4, 2020      Ofe Velez MD  [address] [facility] [place] [address]           [facility]  [address] [facility] [place] [place] [address]  Phone: [phone]  Fax: [phone]          Patient: Mouna Chandra   MR Number: [id number]   YOB: 1955   Date of Visit: 9/4/2020       Dear Dr. Ofe Velez:    Thank you for referring Mouna Chandra to me for evaluation. Attached you will find relevant portions of my assessment and plan of care.    If you have questions, please do not hesitate to call me. I look forward to following Mouna Chandra along with you.    Sincerely,    Nila Sheehan MD    Enclosure  CC:  No Recipients    If you would like to receive this communication electronically, please contact [email or web address] or [phone] to request more information on [employer] access.    For providers and/or their staff who would like to refer a patient to [facility], please contact us through our one-stop-shop provider referral line, [facility], at [phone].    If you feel you have received this communication in error or would no longer like to receive these types of communications, please e-mail [email or web address]

## 2020-09-04 NOTE — PROGRESS NOTES
Subjective:   Patient ID:  Mouna Chandra is a 65 y.o. female who presents for evaluation of No chief complaint on file.      HPI  A  66 yo female was covid positive July 31 has gerd h/o hlp panic attack is here referred from DR BUTTERFIELD FOR PALPITATION. SHE HAD SHORTNESS OF BREATH WEAKNESS WEAKNESS FATIGUE THAT HAS IMPROVED WITH COVID. HAS ALSO CHEST PAIN THAT WAS PRESSURE LIKE MID CHEST   DIFFUSE MAKE HER FEEL WORRIED SHE HAD ASSOCIATED SHORTNESS OF BREATH NO RADIATION NON EXERTIONAL. SHE HAS STRONG FH CAD.  HER CHEST PAIN IS IMPROVED. SHE ALSO HAS RECURRENT EPISODE OF SUDDEN EPISODE OF HEART JUMPING FAST BEATING THAT CAN OCCUR  WITH NO RHYME OR REASON OCCURS AND STOPS SUDDENLY GETS  NO SHORTNESS OF BREATH SWEATING NAUSEA OR CHEST PAIN. NO SYNCOPE . HER EPISODE ARE VERY SHORT LIVED NO REAL TRIGGER. SHE SNORES AT NITE STOPS BREATHING HAS STIGMATA FOR SLEEP APNEA.   Past Medical History:   Diagnosis Date    Anxiety 12/21/2016    Anxiety and depression     Familial juvenile macular degeneration syndrome - Both Eyes 11/12/2013    Hyperlipidemia LDL goal < 100     Lumbar disc disease     Dr. Chandra    Palpitation 12/21/2016    Panic attack 12/21/2016    Vitamin D deficiency        Past Surgical History:   Procedure Laterality Date    HYSTERECTOMY      TOTAL ABDOMINAL HYSTERECTOMY W/ BILATERAL SALPINGOOPHORECTOMY  2002       Social History     Tobacco Use    Smoking status: Passive Smoke Exposure - Never Smoker    Smokeless tobacco: Never Used   Substance Use Topics    Alcohol use: Yes     Alcohol/week: 1.0 standard drinks     Types: 1 Standard drinks or equivalent per week     Frequency: 2-4 times a month     Drinks per session: 1 or 2     Binge frequency: Never     Comment: Socially    Drug use: No       Family History   Problem Relation Age of Onset    Diabetes Mother     Hypertension Mother     Heart failure Father     Heart attack Father     Macular degeneration Sister      Amblyopia Sister     Cataracts Paternal Uncle     Heart failure Paternal Uncle     Stroke Paternal Uncle     Heart failure Paternal Grandfather     Heart disease Brother 45    Heart attack Paternal Grandmother     Blindness Neg Hx     Cancer Neg Hx     Glaucoma Neg Hx     Retinal detachment Neg Hx     Strabismus Neg Hx     Thyroid disease Neg Hx     Colon cancer Neg Hx        Current Outpatient Medications   Medication Sig    ALPRAZolam (XANAX) 0.25 MG tablet Take 1 tablet (0.25 mg total) by mouth 2 (two) times daily as needed for Anxiety.    cholecalciferol, vitamin D3, (VITAMIN D3) 50 mcg (2,000 unit) Cap Take 1 capsule (2,000 Units total) by mouth once daily.    dicyclomine (BENTYL) 10 MG capsule Take 1 capsule (10 mg total) by mouth before meals and at bedtime as needed (abdominal spasms).    fluticasone propionate (FLONASE) 50 mcg/actuation nasal spray TWO SPRAYS EACH NOSTRIL ONCE DAILY.    ibuprofen (ADVIL,MOTRIN) 800 MG tablet Take 800 mg by mouth every 6 (six) hours as needed for Pain.    lovastatin (MEVACOR) 40 MG tablet TAKE 1 TABLET(40 MG) BY MOUTH EVERY EVENING    paroxetine (PAXIL) 40 MG tablet TAKE 1 TABLET(40 MG) BY MOUTH EVERY MORNING    traZODone (DESYREL) 50 MG tablet TAKE 1 AND 1/2 TABLETS(75 MG) BY MOUTH EVERY EVENING    moxifloxacin (VIGAMOX) 0.5 % ophthalmic solution INT 1 GTT IN OS TID FOR 7 DAYS     No current facility-administered medications for this visit.      Current Outpatient Medications on File Prior to Visit   Medication Sig    ALPRAZolam (XANAX) 0.25 MG tablet Take 1 tablet (0.25 mg total) by mouth 2 (two) times daily as needed for Anxiety.    cholecalciferol, vitamin D3, (VITAMIN D3) 50 mcg (2,000 unit) Cap Take 1 capsule (2,000 Units total) by mouth once daily.    dicyclomine (BENTYL) 10 MG capsule Take 1 capsule (10 mg total) by mouth before meals and at bedtime as needed (abdominal spasms).    fluticasone propionate (FLONASE) 50 mcg/actuation nasal  spray TWO SPRAYS EACH NOSTRIL ONCE DAILY.    ibuprofen (ADVIL,MOTRIN) 800 MG tablet Take 800 mg by mouth every 6 (six) hours as needed for Pain.    lovastatin (MEVACOR) 40 MG tablet TAKE 1 TABLET(40 MG) BY MOUTH EVERY EVENING    paroxetine (PAXIL) 40 MG tablet TAKE 1 TABLET(40 MG) BY MOUTH EVERY MORNING    traZODone (DESYREL) 50 MG tablet TAKE 1 AND 1/2 TABLETS(75 MG) BY MOUTH EVERY EVENING    moxifloxacin (VIGAMOX) 0.5 % ophthalmic solution INT 1 GTT IN OS TID FOR 7 DAYS     No current facility-administered medications on file prior to visit.        Review of patient's allergies indicates:   Allergen Reactions    Adhesive Rash    Codeine Nausea And Vomiting    Iodine containing multivitamin Nausea Only    Lanolin Hives    Latex, natural rubber Hives    Neosporin [benzalkonium chloride] Hives    Shellfish containing products Nausea And Vomiting    Neosporin (neomycin-polymyx) Hives, Itching and Rash       Review of Systems   Constitution: Positive for malaise/fatigue. Negative for diaphoresis and weight gain.   HENT: Negative for hoarse voice.    Eyes: Negative for double vision and visual disturbance.   Cardiovascular: Positive for chest pain, dyspnea on exertion and palpitations. Negative for claudication, cyanosis, irregular heartbeat, leg swelling, near-syncope, orthopnea, paroxysmal nocturnal dyspnea and syncope.   Respiratory: Positive for shortness of breath, sleep disturbances due to breathing and snoring. Negative for cough and hemoptysis.    Hematologic/Lymphatic: Negative for bleeding problem. Does not bruise/bleed easily.   Skin: Negative for color change and poor wound healing.   Musculoskeletal: Negative for muscle cramps, muscle weakness and myalgias.   Gastrointestinal: Negative for bloating, abdominal pain, change in bowel habit, diarrhea, heartburn, hematemesis, hematochezia, melena and nausea.   Neurological: Positive for excessive daytime sleepiness. Negative for dizziness,  headaches, light-headedness, loss of balance, numbness and weakness.   Psychiatric/Behavioral: Negative for memory loss. The patient does not have insomnia.    Allergic/Immunologic: Negative for hives.       Objective:   Physical Exam   Constitutional: She is oriented to person, place, and time. She appears well-developed and well-nourished. She does not appear ill. No distress.   HENT:   Head: Normocephalic and atraumatic.   Eyes: Pupils are equal, round, and reactive to light. EOM are normal. No scleral icterus.   Neck: Normal range of motion. Neck supple. Normal carotid pulses, no hepatojugular reflux and no JVD present. Carotid bruit is not present. No tracheal deviation present. No thyromegaly present.   Cardiovascular: Normal rate, regular rhythm, normal heart sounds, intact distal pulses and normal pulses. Exam reveals no gallop and no friction rub.   No murmur heard.  Pulses:       Carotid pulses are 2+ on the right side and 2+ on the left side.       Radial pulses are 2+ on the right side and 2+ on the left side.        Femoral pulses are 2+ on the right side and 2+ on the left side.       Popliteal pulses are 2+ on the right side and 2+ on the left side.        Dorsalis pedis pulses are 2+ on the right side and 2+ on the left side.        Posterior tibial pulses are 2+ on the right side and 2+ on the left side.   Pulmonary/Chest: Effort normal and breath sounds normal. No respiratory distress. She has no wheezes. She has no rhonchi. She has no rales. She exhibits no tenderness.   Abdominal: Soft. Normal appearance, normal aorta and bowel sounds are normal. She exhibits no distension, no abdominal bruit, no ascites and no pulsatile midline mass. There is no hepatomegaly. There is no abdominal tenderness.   Musculoskeletal:         General: No edema.      Right shoulder: She exhibits no deformity.   Neurological: She is alert and oriented to person, place, and time. She has normal strength. No cranial nerve  "deficit. Coordination normal.   Skin: Skin is warm and dry. No rash noted. She is not diaphoretic. No cyanosis or erythema. Nails show no clubbing.   Psychiatric: She has a normal mood and affect. Her speech is normal and behavior is normal.   Nursing note and vitals reviewed.    Vitals:    09/04/20 1010 09/04/20 1015   BP: 124/76 126/78   BP Location: Left arm Right arm   Patient Position: Sitting    Pulse: 69    SpO2: 99%    Weight: 83 kg (182 lb 15.7 oz)    Height: 5' 1" (1.549 m)      Lab Results   Component Value Date    CHOL 164 06/30/2020    CHOL 221 (H) 04/29/2020    CHOL 149 12/21/2017     Lab Results   Component Value Date    HDL 48 06/30/2020    HDL 48 04/29/2020    HDL 49 12/21/2017     Lab Results   Component Value Date    LDLCALC 98.8 06/30/2020    LDLCALC 156.6 04/29/2020    LDLCALC 90.4 12/21/2017     Lab Results   Component Value Date    TRIG 86 06/30/2020    TRIG 82 04/29/2020    TRIG 48 12/21/2017     Lab Results   Component Value Date    CHOLHDL 29.3 06/30/2020    CHOLHDL 21.7 04/29/2020    CHOLHDL 32.9 12/21/2017       Chemistry        Component Value Date/Time     04/29/2020 0821    K 4.3 04/29/2020 0821     04/29/2020 0821    CO2 28 04/29/2020 0821    BUN 20 04/29/2020 0821    CREATININE 0.8 04/29/2020 0821    GLU 98 04/29/2020 0821        Component Value Date/Time    CALCIUM 9.4 04/29/2020 0821    ALKPHOS 72 04/29/2020 0821    AST 21 04/29/2020 0821    ALT 13 06/30/2020 0808    BILITOT 0.5 04/29/2020 0821    ESTGFRAFRICA >60.0 04/29/2020 0821    EGFRNONAA >60.0 04/29/2020 0821          Lab Results   Component Value Date    TSH 1.888 04/29/2020     Lab Results   Component Value Date    INR 1.0 06/07/2015     Lab Results   Component Value Date    WBC 6.20 04/29/2020    HGB 13.4 04/29/2020    HCT 41.9 04/29/2020    MCV 91 04/29/2020     04/29/2020     BNP  @LABRCNTIP(BNP,BNPTRIAGEBLO)@  CrCl cannot be calculated (Patient's most recent lab result is older than the maximum 7 " days allowed.).   EKG HAS NSR NON SPECIFIC T WAVE CHANGES.  Assessment:     1. Palpitation    2. Mixed hyperlipidemia    3. Mixed anxiety and depressive disorder    4. Vitamin D deficiency    5. Gastroesophageal reflux disease without esophagitis    6. Palpitations    7. Chest pain syndrome      HAS STIGMATA FOR SLEEP APNEA NEEDING EVALUATION CONTRIBUTING TO THESE PALPITATION.NEEDS EVAL   RECOVERING FROM COVID   RECURRENT EPISODES OF PALPITATION AND CHEST PAIN SHORTNESS OF BREATH NEEDING EVALUATION NON INVASIVELY SPECIALLY HSE HAS STRONG FH CAD.  Plan:   HOLTER   ECHO   CARDIOLITE   SLEEP EVAL.  F/U IN 1 MONTH FOR REVIEW.

## 2020-09-08 ENCOUNTER — TELEPHONE (OUTPATIENT)
Dept: PULMONOLOGY | Facility: CLINIC | Age: 65
End: 2020-09-08

## 2020-09-08 NOTE — TELEPHONE ENCOUNTER
----- Message from Kristel Veras sent at 9/4/2020  4:15 PM CDT -----  Regarding: Appt  Pt is requesting call back in regards to scheduling appt from referral            Pls call back at [phone]

## 2020-09-09 ENCOUNTER — TELEPHONE (OUTPATIENT)
Dept: PULMONOLOGY | Facility: CLINIC | Age: 65
End: 2020-09-09

## 2020-09-09 DIAGNOSIS — R06.02 SOB (SHORTNESS OF BREATH): Primary | ICD-10-CM

## 2020-09-21 ENCOUNTER — HOSPITAL ENCOUNTER (OUTPATIENT)
Dept: RADIOLOGY | Facility: HOSPITAL | Age: 65
Discharge: HOME | End: 2020-09-21
Attending: INTERNAL MEDICINE
Payer: MEDICAID

## 2020-09-21 ENCOUNTER — TELEPHONE (OUTPATIENT)
Dept: PULMONOLOGY | Facility: CLINIC | Age: 65
End: 2020-09-21

## 2020-09-21 ENCOUNTER — OFFICE VISIT (OUTPATIENT)
Dept: PULMONOLOGY | Facility: CLINIC | Age: 65
End: 2020-09-21
Payer: MEDICAID

## 2020-09-21 VITALS
RESPIRATION RATE: 17 BRPM | HEART RATE: 83 BPM | WEIGHT: 180.75 LBS | DIASTOLIC BLOOD PRESSURE: 80 MMHG | BODY MASS INDEX: 34.13 KG/M2 | SYSTOLIC BLOOD PRESSURE: 110 MMHG | OXYGEN SATURATION: 98 % | HEIGHT: 61 IN

## 2020-09-21 DIAGNOSIS — G47.33 OSA (OBSTRUCTIVE SLEEP APNEA): Primary | ICD-10-CM

## 2020-09-21 DIAGNOSIS — Z86.16 HISTORY OF 2019 NOVEL CORONAVIRUS DISEASE (COVID-19): ICD-10-CM

## 2020-09-21 DIAGNOSIS — E78.2 MIXED HYPERLIPIDEMIA: Chronic | ICD-10-CM

## 2020-09-21 DIAGNOSIS — I77.1 TORTUOUS AORTA: ICD-10-CM

## 2020-09-21 DIAGNOSIS — R06.02 SOB (SHORTNESS OF BREATH): ICD-10-CM

## 2020-09-21 PROCEDURE — 71046 X-RAY EXAM CHEST 2 VIEWS: CPT | Mod: TC

## 2020-09-21 PROCEDURE — 99205 PR OFFICE/OUTPT VISIT, NEW, LEVL V, 60-74 MIN: ICD-10-PCS | Mod: S$PBB,,, | Performed by: INTERNAL MEDICINE

## 2020-09-21 PROCEDURE — 99999 PR PBB SHADOW E&M-EST. PATIENT-LVL IV: CPT | Mod: PBBFAC,,, | Performed by: INTERNAL MEDICINE

## 2020-09-21 PROCEDURE — 99214 OFFICE O/P EST MOD 30 MIN: CPT | Mod: PBBFAC,25 | Performed by: INTERNAL MEDICINE

## 2020-09-21 PROCEDURE — 99205 OFFICE O/P NEW HI 60 MIN: CPT | Mod: S$PBB,,, | Performed by: INTERNAL MEDICINE

## 2020-09-21 PROCEDURE — 71046 XR CHEST PA AND LATERAL: ICD-10-PCS | Mod: 26,,, | Performed by: RADIOLOGY

## 2020-09-21 PROCEDURE — 71046 X-RAY EXAM CHEST 2 VIEWS: CPT | Mod: 26,,, | Performed by: RADIOLOGY

## 2020-09-21 PROCEDURE — 99999 PR PBB SHADOW E&M-EST. PATIENT-LVL IV: ICD-10-PCS | Mod: PBBFAC,,, | Performed by: INTERNAL MEDICINE

## 2020-09-21 NOTE — PATIENT INSTRUCTIONS
Your provider has scheduled you for a sleep study.   You should be receiving a phone call from the sleep lab shortly after your study has been approved by your insurance. Please make sure you have your current phone numbers in the [facility] system. If you do not hear from anyone in the next 10 business days, please call the sleep lab at [phone] to schedule your sleep study. The sleep studies are performed at [facility] seven nights a week.  When you are scheduling your sleep study, they will also make you a follow up appointment with your provider. This follow up appointment will be 10-14 days after your sleep study to review the results. If it is noted that you do have sleep apnea on your initial sleep study, you may receive a call back for a second night study with the CPAP before you come back to the office.

## 2020-09-21 NOTE — PROGRESS NOTES
Pulmonary Outpatient   Visit     Subjective:       Patient ID: Mouna Chandra is a 65 y.o. female.  Patient Active Problem List   Diagnosis    Familial drusen of both eyes    Mixed hyperlipidemia    Mixed anxiety and depressive disorder    Lumbar disc disease    Vitamin D deficiency    Allergic rhinitis    GERD (gastroesophageal reflux disease)    Diaphragmatic hernia without mention of obstruction or gangrene    Panic attack    Palpitations    Advanced atrophic nonexudative age-related macular degeneration of both eyes without subfoveal involvement    Chest pain syndrome    Tortuous aorta    History of 2019 novel coronavirus disease (COVID-19)      Immunization History   Administered Date(s) Administered    Influenza 10/07/2008, 10/14/2011, 11/29/2012, 11/06/2015    Influenza - High Dose - PF (65 years and older) 11/06/2015    Influenza - Quadrivalent 12/16/2014, 10/05/2016    Influenza - Quadrivalent - PF *Preferred* (6 months and older) 12/28/2017    Pneumococcal Conjugate - 13 Valent 03/21/2017    Tdap 08/29/2008, 11/21/2016    Zoster 10/05/2016      Social History     Tobacco Use   Smoking Status Passive Smoke Exposure - Never Smoker   Smokeless Tobacco Never Used      EPWORTH SLEEPINESS SCALE 9/21/2020   Sitting and reading 3   Watching TV 3   Sitting, inactive in a public place (e.g. a theatre or a meeting) 2   As a passenger in a car for an hour without a break 0   Lying down to rest in the afternoon when circumstances permit 2   Sitting and talking to someone 0   Sitting quietly after a lunch without alcohol 1   In a car, while stopped for a few minutes in traffic 0   Total score 11     RLS score 18 ( Q 7-10) not filled     Chief Complaint: Sleep Apnea      Mouna Chandra is 65 y.o.  Asked to see me by Nila Sheehan MD  Reports loud snoring, witnessed apnea, recurrent WASO  Reported to her by Boyfriend: Now sleeps oin  couch, 2 deseased spuses: 2016 and 2007.  Bed time 10 pm, Wake time 6 am  SOL sometimes delayed by legs: better after warm bath  Legs bother  Signs of narcolepsy: vivid dreams, weakness when excited, move violently in sleep, physical discomfort often.  Naps 30 mins, 3 days a week    Legs restless all night, sudden sleep attacks        STOP - BANG Questionnaire:     1. Snoring : Do you snore loudly ?    Yes    2. Tired : Do you often feel tired, fatigued, or sleepy during daytime? Yes    3. Observed: Has anyone observed you stop breathing during your sleep?   Yes     4. Blood pressure : Do you have or are you being treated for high blood pressure?   No    5. BMI :BMI more than 35 kg/m2?   No    6. Age : Age over 50 yr old?   Yes    7. Neck circumference: Neck circumference greater than 40 cm?   No    8. Gender: Gender male?   No       Intermediate risk of TANIKA: Yes  4         References:   STOP Questionnaire   A Tool to Screen Patients for Obstructive Sleep Apnea: [facility]OST[facility] Bills.PEDRO.C.P.C., Gab Salgado M.B.B.S., Cristal Braxton M.D.,Jackelyn Peacock, Ph.D., Jack Michaud M.B.B.S.,_ FRAN Fernandez.Sc.,_ Sina Will M.D., KOSTAS Saeed.R.C.P.C.; Anesthesiology 2008; 108:812-21 Copyright © 2008, the American Society of Anesthesiologists, Inc. [employer]             Review of Systems   Constitutional: Positive for fatigue.   Respiratory: Positive for apnea and asthma nighttime symptoms.    All other systems reviewed and are negative.      Outpatient Encounter Medications as of 9/21/2020   Medication Sig Dispense Refill    ALPRAZolam (XANAX) 0.25 MG tablet Take 1 tablet (0.25 mg total) by mouth 2 (two) times daily as needed for Anxiety. 40 tablet 1    cholecalciferol, vitamin D3, (VITAMIN D3) 50 mcg (2,000 unit) Cap Take 1 capsule (2,000 Units total) by mouth once daily. 100 capsule 6    dicyclomine (BENTYL) 10 MG capsule Take 1 capsule (10 mg total) by mouth before meals  "and at bedtime as needed (abdominal spasms). 60 capsule 3    fluticasone propionate (FLONASE) 50 mcg/actuation nasal spray TWO SPRAYS EACH NOSTRIL ONCE DAILY. 16 g 6    ibuprofen (ADVIL,MOTRIN) 800 MG tablet Take 800 mg by mouth every 6 (six) hours as needed for Pain.      lovastatin (MEVACOR) 40 MG tablet TAKE 1 TABLET(40 MG) BY MOUTH EVERY EVENING 90 tablet 3    moxifloxacin (VIGAMOX) 0.5 % ophthalmic solution INT 1 GTT IN OS TID FOR 7 DAYS      paroxetine (PAXIL) 40 MG tablet TAKE 1 TABLET(40 MG) BY MOUTH EVERY MORNING 30 tablet 11    traZODone (DESYREL) 50 MG tablet TAKE 1 AND 1/2 TABLETS(75 MG) BY MOUTH EVERY EVENING 45 tablet 11     No facility-administered encounter medications on file as of 9/21/2020.        Objective:     Vital Signs (Most Recent)  Vital Signs  Pulse: 83  Resp: 17  SpO2: 98 %  BP: 110/80  Height and Weight  Height: 5' 1" (154.9 cm)  Weight: 82 kg (180 lb 12.4 oz)  BSA (Calculated - sq m): 1.88 sq meters  BMI (Calculated): 34.2  Weight in (lb) to have BMI = 25: 132]  Wt Readings from Last 2 Encounters:   09/21/20 82 kg (180 lb 12.4 oz)   09/04/20 83 kg (182 lb 15.7 oz)       Physical Exam   Constitutional: She is oriented to person, place, and time. She appears well-developed. She is obese.   HENT:   Head: Normocephalic.   Nose: Nose normal.   Mouth/Throat: Oropharynx is clear and moist. Mallampati Score: I.   Neck: Normal range of motion. Neck supple.   Cardiovascular: Normal rate, normal heart sounds and intact distal pulses.   No murmur heard.  Pulmonary/Chest: Normal expansion, symmetric chest wall expansion, effort normal and breath sounds normal.   Abdominal: Soft. Bowel sounds are normal.   Musculoskeletal: Normal range of motion.         General: No deformity or edema.   Lymphadenopathy:     She has no cervical adenopathy.     She has no axillary adenopathy.   Neurological: She is alert and oriented to person, place, and time. She has normal reflexes. Gait normal.   Skin: Skin " is warm and dry.   Psychiatric: She has a normal mood and affect.   Nursing note and vitals reviewed.      Laboratory  Lab Results   Component Value Date    WBC 6.20 04/29/2020    RBC 4.59 04/29/2020    HGB 13.4 04/29/2020    HCT 41.9 04/29/2020    MCV 91 04/29/2020    MCH 29.2 04/29/2020    MCHC 32.0 04/29/2020    RDW 12.9 04/29/2020     04/29/2020    MPV 9.5 04/29/2020    GRAN 3.4 04/29/2020    GRAN 55.2 04/29/2020    LYMPH 2.0 04/29/2020    LYMPH 31.8 04/29/2020    MONO 0.5 04/29/2020    MONO 7.6 04/29/2020    EOS 0.3 04/29/2020    BASO 0.07 04/29/2020    EOSINOPHIL 4.0 04/29/2020    BASOPHIL 1.1 04/29/2020       BMP  Lab Results   Component Value Date     04/29/2020    K 4.3 04/29/2020     04/29/2020    CO2 28 04/29/2020    BUN 20 04/29/2020    CREATININE 0.8 04/29/2020    CALCIUM 9.4 04/29/2020    ANIONGAP 6 (L) 04/29/2020    ESTGFRAFRICA >60.0 04/29/2020    EGFRNONAA >60.0 04/29/2020    AST 21 04/29/2020    ALT 13 06/30/2020    PROT 7.6 04/29/2020       Lab Results   Component Value Date    BNP 70 06/07/2015       Lab Results   Component Value Date    TSH 1.888 04/29/2020       Lab Results   Component Value Date    SEDRATE 14 06/11/2015       Lab Results   Component Value Date    CRP 4.2 06/11/2015     No results found for: IGE     No results found for: ASPERGILLUS  No results found for: AFUMIGATUSCL     No results found for: ACE     Diagnostic Results:  I have personally reviewed today the following studies:  CXR    EXAMINATION:  XR CHEST PA AND LATERAL     CLINICAL HISTORY:  Shortness of breath     TECHNIQUE:  PA and lateral views of the chest were performed.     COMPARISON:  01/31/2017     FINDINGS:  The cardiac and mediastinal silhouettes appear within normal limits.   The lungs are clear bilaterally.  No acute osseous findings demonstrated.     Impression:     No acute findings.      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    We have discussed weight loss , non supine position, good sleep hygiene, and  other interventions to foster good natural healthy sleep.  We have discussed behavioral modifications, as well.  After her study, she  could certainly try PAP therapy or out suitable alternatives      Follow up in about 4 weeks (around 10/19/2020), or PSG, RLS score, labs, for scheduled follow review results Ms Lejeune/Ms Schulte.    This note was prepared using voice recognition system and is likely to have sound alike errors that may have been overlooked even after proof reading.  Please call me with any questions    Discussed diagnosis, its evaluation, treatment and usual course. All questions answered.      Charlie Nobles MD

## 2020-09-21 NOTE — ASSESSMENT & PLAN NOTE
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

## 2020-09-21 NOTE — LETTER
September 21, 2020      Nila Sheehan MD  [address]  [place] [address]           [facility]  [address]  [place] [address]  Phone: [phone]  Fax: [phone]          Patient: Mouna Chandra   MR Number: [id number]   YOB: 1955   Date of Visit: 9/21/2020       Dear Dr. Nila Sheehan:    Thank you for referring Mouna Chandra to me for evaluation. Attached you will find relevant portions of my assessment and plan of care.    If you have questions, please do not hesitate to call me. I look forward to following Mouna Chandra along with you.    Sincerely,    Charlie Nobles MD    Enclosure  CC:  No Recipients    If you would like to receive this communication electronically, please contact [email or web address] or [phone] to request more information on [employer] Link access.    For providers and/or their staff who would like to refer a patient to [facility], please contact us through our one-stop-shop provider referral line, [facility], at [phone].    If you feel you have received this communication in error or would no longer like to receive these types of communications, please e-mail [email or web address]

## 2020-09-22 ENCOUNTER — TELEPHONE (OUTPATIENT)
Dept: PULMONOLOGY | Facility: CLINIC | Age: 65
End: 2020-09-22

## 2020-09-23 ENCOUNTER — PATIENT MESSAGE (OUTPATIENT)
Dept: FAMILY MEDICINE | Facility: CLINIC | Age: 65
End: 2020-09-23

## 2020-09-24 ENCOUNTER — HOSPITAL ENCOUNTER (OUTPATIENT)
Dept: CARDIOLOGY | Facility: HOSPITAL | Age: 65
Discharge: HOME | End: 2020-09-24
Attending: INTERNAL MEDICINE
Payer: MEDICAID

## 2020-09-24 VITALS
HEIGHT: 61 IN | SYSTOLIC BLOOD PRESSURE: 121 MMHG | DIASTOLIC BLOOD PRESSURE: 74 MMHG | BODY MASS INDEX: 33.99 KG/M2 | WEIGHT: 180 LBS

## 2020-09-24 DIAGNOSIS — R00.2 PALPITATIONS: ICD-10-CM

## 2020-09-24 DIAGNOSIS — R07.9 CHEST PAIN SYNDROME: ICD-10-CM

## 2020-09-24 PROCEDURE — 93351 STRESS TTE COMPLETE: CPT | Mod: 26,,, | Performed by: INTERNAL MEDICINE

## 2020-09-24 PROCEDURE — 93320 STRESS ECHO (CUPID ONLY): ICD-10-PCS | Mod: 26,,, | Performed by: INTERNAL MEDICINE

## 2020-09-24 PROCEDURE — 93227 XTRNL ECG REC<48 HR R&I: CPT | Mod: ,,, | Performed by: INTERNAL MEDICINE

## 2020-09-24 PROCEDURE — 93351 STRESS TTE COMPLETE: CPT

## 2020-09-24 PROCEDURE — 93351 STRESS ECHO (CUPID ONLY): ICD-10-PCS | Mod: 26,,, | Performed by: INTERNAL MEDICINE

## 2020-09-24 PROCEDURE — 93227 HOLTER MONITOR - 48 HOUR (CUPID ONLY): ICD-10-PCS | Mod: ,,, | Performed by: INTERNAL MEDICINE

## 2020-09-24 PROCEDURE — 93226 XTRNL ECG REC<48 HR SCAN A/R: CPT

## 2020-09-24 PROCEDURE — 93325 STRESS ECHO (CUPID ONLY): ICD-10-PCS | Mod: 26,,, | Performed by: INTERNAL MEDICINE

## 2020-09-24 PROCEDURE — 93325 DOPPLER ECHO COLOR FLOW MAPG: CPT | Mod: 26,,, | Performed by: INTERNAL MEDICINE

## 2020-09-24 PROCEDURE — 93320 DOPPLER ECHO COMPLETE: CPT | Mod: 26,,, | Performed by: INTERNAL MEDICINE

## 2020-09-25 LAB
AORTIC ROOT ANNULUS: 2.99 CM
AV INDEX (PROSTH): 0.98
AV MEAN GRADIENT: 4 MMHG
AV PEAK GRADIENT: 8 MMHG
AV VALVE AREA: 2.85 CM2
AV VELOCITY RATIO: 0.94
BSA FOR ECHO PROCEDURE: 1.87 M2
CV ECHO LV RWT: 0.52 CM
CV STRESS BASE HR: 78 BPM
DIASTOLIC BLOOD PRESSURE: 74 MMHG
DOP CALC AO PEAK VEL: 1.41 M/S
DOP CALC AO VTI: 29.33 CM
DOP CALC LVOT AREA: 2.9 CM2
DOP CALC LVOT DIAMETER: 1.92 CM
DOP CALC LVOT PEAK VEL: 1.32 M/S
DOP CALC LVOT STROKE VOLUME: 83.57 CM3
DOP CALC RVOT PEAK VEL: 0.93 M/S
DOP CALC RVOT VTI: 23.23 CM
DOP CALCLVOT PEAK VEL VTI: 28.88 CM
E WAVE DECELERATION TIME: 169.14 MSEC
E/A RATIO: 1.56
ECHO LV POSTERIOR WALL: 0.98 CM (ref 0.6–1.1)
FRACTIONAL SHORTENING: 41 % (ref 28–44)
INTERVENTRICULAR SEPTUM: 1.28 CM (ref 0.6–1.1)
IVRT: 69.2 MSEC
LA MAJOR: 3.89 CM
LA MINOR: 4.15 CM
LA WIDTH: 4.1 CM
LEFT ATRIUM SIZE: 4.05 CM
LEFT ATRIUM VOLUME INDEX: 31.4 ML/M2
LEFT ATRIUM VOLUME: 56.68 CM3
LEFT INTERNAL DIMENSION IN SYSTOLE: 2.21 CM (ref 2.1–4)
LEFT VENTRICLE DIASTOLIC VOLUME INDEX: 33.74 ML/M2
LEFT VENTRICLE DIASTOLIC VOLUME: 60.94 ML
LEFT VENTRICLE MASS INDEX: 77 G/M2
LEFT VENTRICLE SYSTOLIC VOLUME INDEX: 9.1 ML/M2
LEFT VENTRICLE SYSTOLIC VOLUME: 16.36 ML
LEFT VENTRICULAR INTERNAL DIMENSION IN DIASTOLE: 3.77 CM (ref 3.5–6)
LEFT VENTRICULAR MASS: 138.44 G
LV SEPTAL E/E' RATIO: 11.1 M/S
MV PEAK A VEL: 0.71 M/S
MV PEAK E VEL: 1.11 M/S
MV STENOSIS PRESSURE HALF TIME: 49.05 MS
MV VALVE AREA P 1/2 METHOD: 4.49 CM2
OHS CV CPX 1 MINUTE RECOVERY HEART RATE: 122 BPM
OHS CV CPX 85 PERCENT MAX PREDICTED HEART RATE MALE: 127
OHS CV CPX ESTIMATED METS: 9
OHS CV CPX MAX PREDICTED HEART RATE: 150
OHS CV CPX PATIENT IS FEMALE: 1
OHS CV CPX PATIENT IS MALE: 0
OHS CV CPX PEAK DIASTOLIC BLOOD PRESSURE: 55 MMHG
OHS CV CPX PEAK HEAR RATE: 141 BPM
OHS CV CPX PEAK RATE PRESSURE PRODUCT: ABNORMAL
OHS CV CPX PEAK SYSTOLIC BLOOD PRESSURE: 171 MMHG
OHS CV CPX PERCENT MAX PREDICTED HEART RATE ACHIEVED: 94
OHS CV CPX RATE PRESSURE PRODUCT PRESENTING: 9438
PISA TR MAX VEL: 2.04 M/S
PV MEAN GRADIENT: 2.25 MMHG
PV PEAK VELOCITY: 0.98 CM/S
RA MAJOR: 3.72 CM
RA WIDTH: 2.97 CM
RIGHT VENTRICULAR END-DIASTOLIC DIMENSION: 2.97 CM
SINUS: 2.82 CM
STJ: 2.78 CM
STRESS ANGINA INDEX: 0
STRESS ECHO POST EXERCISE DUR MIN: 6 MINUTES
STRESS ECHO POST EXERCISE DUR SEC: 22 SECONDS
SYSTOLIC BLOOD PRESSURE: 121 MMHG
TDI SEPTAL: 0.1 M/S
TR MAX PG: 17 MMHG
TRICUSPID ANNULAR PLANE SYSTOLIC EXCURSION: 2.18 CM

## 2020-09-27 ENCOUNTER — PATIENT MESSAGE (OUTPATIENT)
Dept: FAMILY MEDICINE | Facility: CLINIC | Age: 65
End: 2020-09-27

## 2020-09-28 LAB
OHS CV EVENT MONITOR DAY: 2
OHS CV HOLTER LENGTH DECIMAL HOURS: 96
OHS CV HOLTER LENGTH HOURS: 48
OHS CV HOLTER LENGTH MINUTES: 0

## 2020-09-29 ENCOUNTER — TELEPHONE (OUTPATIENT)
Dept: CARDIOLOGY | Facility: CLINIC | Age: 65
End: 2020-09-29

## 2020-10-06 ENCOUNTER — PATIENT OUTREACH (OUTPATIENT)
Dept: ADMINISTRATIVE | Facility: OTHER | Age: 65
End: 2020-10-06

## 2020-10-07 ENCOUNTER — OFFICE VISIT (OUTPATIENT)
Dept: CARDIOLOGY | Facility: CLINIC | Age: 65
End: 2020-10-07
Payer: MEDICAID

## 2020-10-07 VITALS
HEART RATE: 83 BPM | SYSTOLIC BLOOD PRESSURE: 132 MMHG | WEIGHT: 183.19 LBS | BODY MASS INDEX: 34.58 KG/M2 | HEIGHT: 61 IN | OXYGEN SATURATION: 97 % | DIASTOLIC BLOOD PRESSURE: 70 MMHG

## 2020-10-07 DIAGNOSIS — R00.2 PALPITATIONS: ICD-10-CM

## 2020-10-07 DIAGNOSIS — E55.9 VITAMIN D DEFICIENCY: ICD-10-CM

## 2020-10-07 DIAGNOSIS — F41.8 MIXED ANXIETY AND DEPRESSIVE DISORDER: ICD-10-CM

## 2020-10-07 DIAGNOSIS — E78.2 MIXED HYPERLIPIDEMIA: Primary | Chronic | ICD-10-CM

## 2020-10-07 DIAGNOSIS — F41.0 PANIC ATTACK: ICD-10-CM

## 2020-10-07 DIAGNOSIS — I77.1 TORTUOUS AORTA: ICD-10-CM

## 2020-10-07 DIAGNOSIS — K21.9 GASTROESOPHAGEAL REFLUX DISEASE WITHOUT ESOPHAGITIS: ICD-10-CM

## 2020-10-07 DIAGNOSIS — R07.9 CHEST PAIN SYNDROME: ICD-10-CM

## 2020-10-07 DIAGNOSIS — Z86.16 HISTORY OF 2019 NOVEL CORONAVIRUS DISEASE (COVID-19): ICD-10-CM

## 2020-10-07 PROCEDURE — 99999 PR PBB SHADOW E&M-EST. PATIENT-LVL IV: CPT | Mod: PBBFAC,,, | Performed by: INTERNAL MEDICINE

## 2020-10-07 PROCEDURE — 99214 OFFICE O/P EST MOD 30 MIN: CPT | Mod: PBBFAC | Performed by: INTERNAL MEDICINE

## 2020-10-07 PROCEDURE — 99999 PR PBB SHADOW E&M-EST. PATIENT-LVL IV: ICD-10-PCS | Mod: PBBFAC,,, | Performed by: INTERNAL MEDICINE

## 2020-10-07 PROCEDURE — 99214 OFFICE O/P EST MOD 30 MIN: CPT | Mod: S$PBB,,, | Performed by: INTERNAL MEDICINE

## 2020-10-07 PROCEDURE — 99214 PR OFFICE/OUTPT VISIT, EST, LEVL IV, 30-39 MIN: ICD-10-PCS | Mod: S$PBB,,, | Performed by: INTERNAL MEDICINE

## 2020-10-07 RX ORDER — MAGNESIUM 250 MG
250 TABLET ORAL ONCE
COMMUNITY

## 2020-10-07 RX ORDER — VITAMIN E 268 MG
400 CAPSULE ORAL DAILY
COMMUNITY

## 2020-10-07 NOTE — PROGRESS NOTES
Subjective:   Patient ID:  Mouna Chandra is a 65 y.o. female who presents for follow up of No chief complaint on file.      HPI   9/4/2020  A  66 yo female was covid positive July 31 has gerd h/o hlp panic attack is here referred from DR BUTTERFIELD FOR PALPITATION. SHE HAD SHORTNESS OF BREATH WEAKNESS WEAKNESS FATIGUE THAT HAS IMPROVED WITH COVID. HAS ALSO CHEST PAIN THAT WAS PRESSURE LIKE MID CHEST   DIFFUSE MAKE HER FEEL WORRIED SHE HAD ASSOCIATED SHORTNESS OF BREATH NO RADIATION NON EXERTIONAL. SHE HAS STRONG FH CAD.  HER CHEST PAIN IS IMPROVED. SHE ALSO HAS RECURRENT EPISODE OF SUDDEN EPISODE OF HEART JUMPING FAST BEATING THAT CAN OCCUR  WITH NO RHYME OR REASON OCCURS AND STOPS SUDDENLY GETS  NO SHORTNESS OF BREATH SWEATING NAUSEA OR CHEST PAIN. NO SYNCOPE . HER EPISODE ARE VERY SHORT LIVED NO REAL TRIGGER. SHE SNORES AT NITE STOPS BREATHING HAS STIGMATA FOR SLEEP APNEA.     10/7/2020   SINCE LAST VISIT [facility] OCCASIONAL PALPITATION STILL FEELS FATIGUE NO CHEST PAIN OR SHORTNESS OF BREATH.   STRESS ECHO DONE NEGATIVE   HOLTER BENIGN.  NO CHF TIA CLAUDICATION NO ANGINA. TRIES TO BE ACTIVE NEEDS MORE MOTIVATION.   SLEEP STUDY PENDING. STILL WORKS 2 JOBS.   Past Medical History:   Diagnosis Date    Anxiety 12/21/2016    Anxiety and depression     Familial juvenile macular degeneration syndrome - Both Eyes 11/12/2013    Hyperlipidemia LDL goal < 100     Lumbar disc disease     Dr. Chandra    Palpitation 12/21/2016    Panic attack 12/21/2016    Vitamin D deficiency        Past Surgical History:   Procedure Laterality Date    HYSTERECTOMY      TOTAL ABDOMINAL HYSTERECTOMY W/ BILATERAL SALPINGOOPHORECTOMY  2002       Social History     Tobacco Use    Smoking status: Passive Smoke Exposure - Never Smoker    Smokeless tobacco: Never Used   Substance Use Topics    Alcohol use: Yes     Alcohol/week: 1.0 standard drinks     Types: 1 Standard drinks or equivalent per week     Frequency: 2-4 times a  month     Drinks per session: 1 or 2     Binge frequency: Never     Comment: Socially    Drug use: No       Family History   Problem Relation Age of Onset    Diabetes Mother     Hypertension Mother     Heart failure Father     Heart attack Father     Macular degeneration Sister     Amblyopia Sister     Cataracts Paternal Uncle     Heart failure Paternal Uncle     Stroke Paternal Uncle     Heart failure Paternal Grandfather     Heart disease Brother 45    Heart attack Paternal Grandmother     Blindness Neg Hx     Cancer Neg Hx     Glaucoma Neg Hx     Retinal detachment Neg Hx     Strabismus Neg Hx     Thyroid disease Neg Hx     Colon cancer Neg Hx        Current Outpatient Medications   Medication Sig    ALPRAZolam (XANAX) 0.25 MG tablet Take 1 tablet (0.25 mg total) by mouth 2 (two) times daily as needed for Anxiety.    cholecalciferol, vitamin D3, (VITAMIN D3) 50 mcg (2,000 unit) Cap Take 1 capsule (2,000 Units total) by mouth once daily. (Patient taking differently: Take 1 capsule by mouth once a week. )    fluticasone propionate (FLONASE) 50 mcg/actuation nasal spray TWO SPRAYS EACH NOSTRIL ONCE DAILY.    ibuprofen (ADVIL,MOTRIN) 800 MG tablet Take 800 mg by mouth every 6 (six) hours as needed for Pain.    lovastatin (MEVACOR) 40 MG tablet TAKE 1 TABLET(40 MG) BY MOUTH EVERY EVENING    magnesium 250 mg Tab Take 250 mg by mouth once.    moxifloxacin (VIGAMOX) 0.5 % ophthalmic solution INT 1 GTT IN OS TID FOR 7 DAYS    multivit,iron,minerals/lutein (CENTRUM SILVER ULTRA WOMEN'S ORAL) Take by mouth once daily.    paroxetine (PAXIL) 40 MG tablet TAKE 1 TABLET(40 MG) BY MOUTH EVERY MORNING    traZODone (DESYREL) 50 MG tablet TAKE 1 AND 1/2 TABLETS(75 MG) BY MOUTH EVERY EVENING    vit A/vit C/vit E/zinc/copper (OCUVITE PRESERVISION ORAL) Take by mouth once daily.    vitamin E 400 UNIT capsule Take 400 Units by mouth once daily.     No current facility-administered medications for this  visit.      Current Outpatient Medications on File Prior to Visit   Medication Sig    ALPRAZolam (XANAX) 0.25 MG tablet Take 1 tablet (0.25 mg total) by mouth 2 (two) times daily as needed for Anxiety.    cholecalciferol, vitamin D3, (VITAMIN D3) 50 mcg (2,000 unit) Cap Take 1 capsule (2,000 Units total) by mouth once daily. (Patient taking differently: Take 1 capsule by mouth once a week. )    fluticasone propionate (FLONASE) 50 mcg/actuation nasal spray TWO SPRAYS EACH NOSTRIL ONCE DAILY.    ibuprofen (ADVIL,MOTRIN) 800 MG tablet Take 800 mg by mouth every 6 (six) hours as needed for Pain.    lovastatin (MEVACOR) 40 MG tablet TAKE 1 TABLET(40 MG) BY MOUTH EVERY EVENING    magnesium 250 mg Tab Take 250 mg by mouth once.    moxifloxacin (VIGAMOX) 0.5 % ophthalmic solution INT 1 GTT IN OS TID FOR 7 DAYS    multivit,iron,minerals/lutein (CENTRUM SILVER ULTRA WOMEN'S ORAL) Take by mouth once daily.    paroxetine (PAXIL) 40 MG tablet TAKE 1 TABLET(40 MG) BY MOUTH EVERY MORNING    traZODone (DESYREL) 50 MG tablet TAKE 1 AND 1/2 TABLETS(75 MG) BY MOUTH EVERY EVENING    vit A/vit C/vit E/zinc/copper (OCUVITE PRESERVISION ORAL) Take by mouth once daily.    vitamin E 400 UNIT capsule Take 400 Units by mouth once daily.     No current facility-administered medications on file prior to visit.      Review of patient's allergies indicates:   Allergen Reactions    Adhesive Rash    Codeine Nausea And Vomiting    Iodine containing multivitamin Nausea Only    Lanolin Hives    Latex, natural rubber Hives    Neosporin [benzalkonium chloride] Hives    Shellfish containing products Nausea And Vomiting    Neosporin (neomycin-polymyx) Hives, Itching and Rash       Review of Systems   Constitution: Negative for diaphoresis, malaise/fatigue and weight gain.   HENT: Negative for hoarse voice.    Eyes: Negative for double vision and visual disturbance.   Cardiovascular: Positive for palpitations. Negative for chest pain,  claudication, cyanosis, dyspnea on exertion, irregular heartbeat, leg swelling, near-syncope, orthopnea, paroxysmal nocturnal dyspnea and syncope.   Respiratory: Negative for cough, hemoptysis, shortness of breath and snoring.    Hematologic/Lymphatic: Negative for bleeding problem. Does not bruise/bleed easily.   Skin: Negative for color change and poor wound healing.   Musculoskeletal: Negative for muscle cramps, muscle weakness and myalgias.   Gastrointestinal: Negative for bloating, abdominal pain, change in bowel habit, diarrhea, heartburn, hematemesis, hematochezia, melena and nausea.   Neurological: Negative for excessive daytime sleepiness, dizziness, headaches, light-headedness, loss of balance, numbness and weakness.   Psychiatric/Behavioral: Negative for memory loss. The patient does not have insomnia.    Allergic/Immunologic: Negative for hives.       Objective:   Physical Exam   Constitutional: She is oriented to person, place, and time. She appears well-developed and well-nourished. She does not appear ill. No distress.   HENT:   Head: Normocephalic and atraumatic.   Eyes: Pupils are equal, round, and reactive to light. EOM are normal. No scleral icterus.   Neck: Normal range of motion. Neck supple. Normal carotid pulses, no hepatojugular reflux and no JVD present. Carotid bruit is not present. No tracheal deviation present. No thyromegaly present.   Cardiovascular: Normal rate, regular rhythm, normal heart sounds, intact distal pulses and normal pulses. Exam reveals no gallop and no friction rub.   No murmur heard.  Pulses:       Carotid pulses are 2+ on the right side and 2+ on the left side.       Radial pulses are 2+ on the right side and 2+ on the left side.        Femoral pulses are 2+ on the right side and 2+ on the left side.       Popliteal pulses are 2+ on the right side and 2+ on the left side.        Dorsalis pedis pulses are 2+ on the right side and 2+ on the left side.        Posterior  "tibial pulses are 2+ on the right side and 2+ on the left side.   Pulmonary/Chest: Effort normal and breath sounds normal. No respiratory distress. She has no wheezes. She has no rhonchi. She has no rales. She exhibits no tenderness.   Abdominal: Soft. Normal appearance, normal aorta and bowel sounds are normal. She exhibits no distension, no abdominal bruit, no ascites and no pulsatile midline mass. There is no hepatomegaly. There is no abdominal tenderness.   OBES EABDOMEN.   Musculoskeletal:         General: No edema.      Right shoulder: She exhibits no deformity.   Neurological: She is alert and oriented to person, place, and time. She has normal strength. No cranial nerve deficit. Coordination normal.   Skin: Skin is warm. No rash noted. She is diaphoretic. No cyanosis or erythema. Nails show no clubbing.   Psychiatric: She has a normal mood and affect. Her speech is normal and behavior is normal.   Nursing note and vitals reviewed.    Vitals:    10/07/20 0946 10/07/20 0947   BP: (!) 144/72 132/70   BP Location: Left arm Right arm   Patient Position: Sitting Sitting   BP Method: Medium (Manual) Medium (Manual)   Pulse: 83    SpO2: 97%    Weight: 83.1 kg (183 lb 3.2 oz)    Height: 5' 1" (1.549 m)      Lab Results   Component Value Date    CHOL 164 06/30/2020    CHOL 221 (H) 04/29/2020    CHOL 149 12/21/2017     Lab Results   Component Value Date    HDL 48 06/30/2020    HDL 48 04/29/2020    HDL 49 12/21/2017     Lab Results   Component Value Date    LDLCALC 98.8 06/30/2020    LDLCALC 156.6 04/29/2020    LDLCALC 90.4 12/21/2017     Lab Results   Component Value Date    TRIG 86 06/30/2020    TRIG 82 04/29/2020    TRIG 48 12/21/2017     Lab Results   Component Value Date    CHOLHDL 29.3 06/30/2020    CHOLHDL 21.7 04/29/2020    CHOLHDL 32.9 12/21/2017       Chemistry        Component Value Date/Time     04/29/2020 0821    K 4.3 04/29/2020 0821     04/29/2020 0821    CO2 28 04/29/2020 0821    BUN 20 " 04/29/2020 0821    CREATININE 0.8 04/29/2020 0821    GLU 98 04/29/2020 0821        Component Value Date/Time    CALCIUM 9.4 04/29/2020 0821    ALKPHOS 72 04/29/2020 0821    AST 21 04/29/2020 0821    ALT 13 06/30/2020 0808    BILITOT 0.5 04/29/2020 0821    ESTGFRAFRICA >60.0 04/29/2020 0821    EGFRNONAA >60.0 04/29/2020 0821          Lab Results   Component Value Date    TSH 1.888 04/29/2020     Lab Results   Component Value Date    INR 1.0 06/07/2015     Lab Results   Component Value Date    WBC 6.20 04/29/2020    HGB 13.4 04/29/2020    HCT 41.9 04/29/2020    MCV 91 04/29/2020     04/29/2020     BMP  Sodium   Date Value Ref Range Status   04/29/2020 140 136 - 145 mmol/L Final     Potassium   Date Value Ref Range Status   04/29/2020 4.3 3.5 - 5.1 mmol/L Final     Chloride   Date Value Ref Range Status   04/29/2020 106 95 - 110 mmol/L Final     CO2   Date Value Ref Range Status   04/29/2020 28 23 - 29 mmol/L Final     BUN, Bld   Date Value Ref Range Status   04/29/2020 20 8 - 23 mg/dL Final     Creatinine   Date Value Ref Range Status   04/29/2020 0.8 0.5 - 1.4 mg/dL Final     Calcium   Date Value Ref Range Status   04/29/2020 9.4 8.7 - 10.5 mg/dL Final     Anion Gap   Date Value Ref Range Status   04/29/2020 6 (L) 8 - 16 mmol/L Final     eGFR if    Date Value Ref Range Status   04/29/2020 >60.0 >60 mL/min/1.73 m^2 Final     eGFR if non    Date Value Ref Range Status   04/29/2020 >60.0 >60 mL/min/1.73 m^2 Final     Comment:     Calculation used to obtain the estimated glomerular filtration  rate (eGFR) is the CKD-EPI equation.        CrCl cannot be calculated (Patient's most recent lab result is older than the maximum 7 days allowed.).    · There were no arrhythmias during stress.  · The test was stopped because the patient experienced fatigue and shortness of breath.  · There is left ventricular concentric remodeling.  · With normal systolic function. The estimated ejection  fraction is 60%.  · Normal left ventricular diastolic function.  · Normal right ventricular systolic function.  · The stress echo portion of this study is negative for myocardial ischemia.  · The ECG portion of this study is negative for myocardial ischemia.    · Supraventricular Arrhythmias There were very rare PACs totalling 59 and averaging 0.61 per hour.  · Predominant Rhythm Sinus rhythm with heart rates varying between 66 and 110 bpm with an average of 81 bpm.     Monitoring started at 10:24 AM and continued for 47 hr 59 min. The average heart rate was 81 BPM. The minimum heart  rate was 66 BPM, occurring at 6:16:24 PM D1. The maximum heart rate was 110 BPM, occurring at 8:04:11 PM D2.  The patient's rhythm included 21 min 24 sec of tachycardia. The fastest single episode of tachycardia occurred at 8:06:30  AM D1, lasting 1 min 36 sec, with maximum heart rate of 106 BPM.  Supraventricular ectopic activity consisted of 59 beats, of which, 4 were in 1 run, 4 were in atrial couplets, 1 was late  beat, 50 were single PACs. The longest R-R interval was 1.2 seconds occurring at 10:43:25 AM D1. The longest N-N interval was  1.2 seconds occurring at 10:43:25 AM D1. The longest supraventricular run occurred at 8:04:14 PM D2 consisting of 4 beats, with  maximum heart rate of 110 BPM. The fastest supraventricular run occurred at 8:04:14 PM D2, consisting of 4 beats, with  maximum heart rate of 110 BPM.  Atrial pairs were noted.  Overall, benign monitor.  Assessment:     1. Mixed hyperlipidemia    2. Mixed anxiety and depressive disorder    3. Vitamin D deficiency    4. Gastroesophageal reflux disease without esophagitis    5. Palpitations    6. Panic attack    7. Chest pain syndrome    8. History of 2019 novel coronavirus disease (COVID-19)    9. Tortuous aorta      OCCASIONAL PALPITATION W/U UNREVEALING/ ENCOURAGED COMPLIANCE DIET EXERCISE WEIGHT LOSS AND ADDRESS SLEEP ISSUES SLEEP STUDY PENDING.   Plan:       Continue  current therapy  Cardiac low salt diet.  Risk factor modification and excercise program./WEIGHT LOSS  F/u PRN

## 2020-10-20 ENCOUNTER — PROCEDURE VISIT (OUTPATIENT)
Dept: SLEEP MEDICINE | Facility: CLINIC | Age: 65
End: 2020-10-20
Payer: MEDICAID

## 2020-10-20 ENCOUNTER — PATIENT MESSAGE (OUTPATIENT)
Dept: PULMONOLOGY | Facility: CLINIC | Age: 65
End: 2020-10-20

## 2020-10-20 DIAGNOSIS — G47.10 HYPERSOMNOLENCE: Primary | ICD-10-CM

## 2020-10-20 DIAGNOSIS — G47.33 OSA (OBSTRUCTIVE SLEEP APNEA): ICD-10-CM

## 2020-10-20 PROCEDURE — 95800 PR SLEEP STUDY, UNATTENDED, RECORD HEART RATE/O2 SAT/RESP ANAL/SLEEP TIME: ICD-10-PCS | Mod: 26,S$PBB,, | Performed by: INTERNAL MEDICINE

## 2020-10-20 PROCEDURE — 95800 SLP STDY UNATTENDED: CPT | Mod: 26,S$PBB,, | Performed by: INTERNAL MEDICINE

## 2020-10-20 PROCEDURE — 95800 SLP STDY UNATTENDED: CPT | Mod: PBBFAC | Performed by: INTERNAL MEDICINE

## 2020-10-20 NOTE — Clinical Note
PHYSICIAN INTERPRETATION AND COMMENTS: Clinically significant sleep disordered breathing is not identified; sleep  disordered breathing does not explain the excessive daytime sleepiness and significant snoring is recorded. severe snoring. Overall  AHI 1.0 events per hour. interventions were snoring may be considered. Weight loss. Based on clinical history reporting vivid  dreams, weakness when excited, moving violently in sleep and daytime sleepiness consider in-lab polysomnography with multiple  sleep latency testing.  CLINICAL HISTORY: 65 year old female presented with: 14.3 inch neck, BMI of 35, an [place] sleepiness score of 16, history  of depression, a previous diagnosis of TANIKA and symptoms of nocturnal snoring, waking up choking and witnessed apneas. Based on  the clinical history, the patient has a high pre-test probability of having moderate TANIKA.

## 2020-10-20 NOTE — PROCEDURES
Home Sleep Studies    Date/Time: 10/20/2020 8:00 AM  Performed by: Charlie Nobles MD  Authorized by: Charlie Nobles MD       PHYSICIAN INTERPRETATION AND COMMENTS: Clinically significant sleep disordered breathing is not identified; sleep  disordered breathing does not explain the excessive daytime sleepiness and significant snoring is recorded. severe snoring. Overall  AHI 1.0 events per hour. interventions were snoring may be considered. Weight loss. Based on clinical history reporting vivid  dreams, weakness when excited, moving violently in sleep and daytime sleepiness consider in-lab polysomnography with multiple  sleep latency testing.  CLINICAL HISTORY: 65 year old female presented with: 14.3 inch neck, BMI of 35, an [place] sleepiness score of 16, history  of depression, a previous diagnosis of TANIKA and symptoms of nocturnal snoring, waking up choking and witnessed apneas. Based on  the clinical history, the patient has a high pre-test probability of having moderate TANIKA.

## 2020-10-26 ENCOUNTER — TELEPHONE (OUTPATIENT)
Dept: PULMONOLOGY | Facility: CLINIC | Age: 65
End: 2020-10-26

## 2020-10-26 NOTE — TELEPHONE ENCOUNTER
Spoke to [facility]  Was approved but y'all need to check provider [email or web address] for auth #    Charlie Nobles MD

## 2020-12-30 ENCOUNTER — PATIENT MESSAGE (OUTPATIENT)
Dept: FAMILY MEDICINE | Facility: CLINIC | Age: 65
End: 2020-12-30

## 2021-01-12 ENCOUNTER — OFFICE VISIT (OUTPATIENT)
Dept: FAMILY MEDICINE | Facility: CLINIC | Age: 66
End: 2021-01-12
Payer: COMMERCIAL

## 2021-01-12 ENCOUNTER — HOSPITAL ENCOUNTER (OUTPATIENT)
Dept: RADIOLOGY | Facility: HOSPITAL | Age: 66
Discharge: HOME | End: 2021-01-12
Attending: INTERNAL MEDICINE
Payer: MEDICARE

## 2021-01-12 ENCOUNTER — PATIENT MESSAGE (OUTPATIENT)
Dept: FAMILY MEDICINE | Facility: CLINIC | Age: 66
End: 2021-01-12

## 2021-01-12 VITALS
SYSTOLIC BLOOD PRESSURE: 138 MMHG | OXYGEN SATURATION: 97 % | WEIGHT: 188.06 LBS | DIASTOLIC BLOOD PRESSURE: 80 MMHG | HEART RATE: 89 BPM | TEMPERATURE: 98 F | BODY MASS INDEX: 35.5 KG/M2 | HEIGHT: 61 IN

## 2021-01-12 DIAGNOSIS — Z12.31 ENCOUNTER FOR SCREENING MAMMOGRAM FOR MALIGNANT NEOPLASM OF BREAST: ICD-10-CM

## 2021-01-12 DIAGNOSIS — I77.1 TORTUOUS AORTA: ICD-10-CM

## 2021-01-12 DIAGNOSIS — E78.2 MIXED HYPERLIPIDEMIA: Primary | Chronic | ICD-10-CM

## 2021-01-12 DIAGNOSIS — K59.00 CONSTIPATION, UNSPECIFIED CONSTIPATION TYPE: ICD-10-CM

## 2021-01-12 DIAGNOSIS — Z29.9 PREVENTIVE MEASURE: ICD-10-CM

## 2021-01-12 DIAGNOSIS — K21.9 GASTROESOPHAGEAL REFLUX DISEASE WITHOUT ESOPHAGITIS: ICD-10-CM

## 2021-01-12 DIAGNOSIS — M51.9 LUMBAR DISC DISEASE: ICD-10-CM

## 2021-01-12 DIAGNOSIS — F41.8 MIXED ANXIETY AND DEPRESSIVE DISORDER: ICD-10-CM

## 2021-01-12 DIAGNOSIS — E55.9 VITAMIN D DEFICIENCY: ICD-10-CM

## 2021-01-12 PROCEDURE — 99999 PR PBB SHADOW E&M-EST. PATIENT-LVL V: CPT | Mod: PBBFAC,,, | Performed by: INTERNAL MEDICINE

## 2021-01-12 PROCEDURE — 3008F PR BODY MASS INDEX (BMI) DOCUMENTED: ICD-10-PCS | Mod: CPTII,S$GLB,, | Performed by: INTERNAL MEDICINE

## 2021-01-12 PROCEDURE — 1101F PR PT FALLS ASSESS DOC 0-1 FALLS W/OUT INJ PAST YR: ICD-10-PCS | Mod: CPTII,S$GLB,, | Performed by: INTERNAL MEDICINE

## 2021-01-12 PROCEDURE — 99499 UNLISTED E&M SERVICE: CPT | Mod: S$GLB,,, | Performed by: INTERNAL MEDICINE

## 2021-01-12 PROCEDURE — 74018 RADEX ABDOMEN 1 VIEW: CPT | Mod: 26,,, | Performed by: RADIOLOGY

## 2021-01-12 PROCEDURE — 90472 ZOSTER RECOMBINANT VACCINE: ICD-10-PCS | Mod: S$GLB,,, | Performed by: INTERNAL MEDICINE

## 2021-01-12 PROCEDURE — 3288F PR FALLS RISK ASSESSMENT DOCUMENTED: ICD-10-PCS | Mod: CPTII,S$GLB,, | Performed by: INTERNAL MEDICINE

## 2021-01-12 PROCEDURE — 90471 FLU VACCINE - QUADRIVALENT - ADJUVANTED: ICD-10-PCS | Mod: S$GLB,,, | Performed by: INTERNAL MEDICINE

## 2021-01-12 PROCEDURE — 74018 XR ABDOMEN AP 1 VIEW: ICD-10-PCS | Mod: 26,,, | Performed by: RADIOLOGY

## 2021-01-12 PROCEDURE — 1125F AMNT PAIN NOTED PAIN PRSNT: CPT | Mod: S$GLB,,, | Performed by: INTERNAL MEDICINE

## 2021-01-12 PROCEDURE — 99214 OFFICE O/P EST MOD 30 MIN: CPT | Mod: 25,S$GLB,, | Performed by: INTERNAL MEDICINE

## 2021-01-12 PROCEDURE — 90750 ZOSTER RECOMBINANT VACCINE: ICD-10-PCS | Mod: S$GLB,,, | Performed by: INTERNAL MEDICINE

## 2021-01-12 PROCEDURE — 90694 FLU VACCINE - QUADRIVALENT - ADJUVANTED: ICD-10-PCS | Mod: S$GLB,,, | Performed by: INTERNAL MEDICINE

## 2021-01-12 PROCEDURE — 99999 PR PBB SHADOW E&M-EST. PATIENT-LVL V: ICD-10-PCS | Mod: PBBFAC,,, | Performed by: INTERNAL MEDICINE

## 2021-01-12 PROCEDURE — 1101F PT FALLS ASSESS-DOCD LE1/YR: CPT | Mod: CPTII,S$GLB,, | Performed by: INTERNAL MEDICINE

## 2021-01-12 PROCEDURE — 1125F PR PAIN SEVERITY QUANTIFIED, PAIN PRESENT: ICD-10-PCS | Mod: S$GLB,,, | Performed by: INTERNAL MEDICINE

## 2021-01-12 PROCEDURE — 90694 VACC AIIV4 NO PRSRV 0.5ML IM: CPT | Mod: S$GLB,,, | Performed by: INTERNAL MEDICINE

## 2021-01-12 PROCEDURE — 99499 RISK ADDL DX/OHS AUDIT: ICD-10-PCS | Mod: S$GLB,,, | Performed by: INTERNAL MEDICINE

## 2021-01-12 PROCEDURE — 90472 IMMUNIZATION ADMIN EACH ADD: CPT | Mod: S$GLB,,, | Performed by: INTERNAL MEDICINE

## 2021-01-12 PROCEDURE — 74018 RADEX ABDOMEN 1 VIEW: CPT | Mod: TC,FY,PO

## 2021-01-12 PROCEDURE — 90750 HZV VACC RECOMBINANT IM: CPT | Mod: S$GLB,,, | Performed by: INTERNAL MEDICINE

## 2021-01-12 PROCEDURE — 90471 IMMUNIZATION ADMIN: CPT | Mod: S$GLB,,, | Performed by: INTERNAL MEDICINE

## 2021-01-12 PROCEDURE — 3008F BODY MASS INDEX DOCD: CPT | Mod: CPTII,S$GLB,, | Performed by: INTERNAL MEDICINE

## 2021-01-12 PROCEDURE — 3288F FALL RISK ASSESSMENT DOCD: CPT | Mod: CPTII,S$GLB,, | Performed by: INTERNAL MEDICINE

## 2021-01-12 PROCEDURE — 99214 PR OFFICE/OUTPT VISIT, EST, LEVL IV, 30-39 MIN: ICD-10-PCS | Mod: 25,S$GLB,, | Performed by: INTERNAL MEDICINE

## 2021-01-12 RX ORDER — DICYCLOMINE HYDROCHLORIDE 10 MG/1
CAPSULE ORAL
COMMUNITY
Start: 2020-12-27 | End: 2023-02-20

## 2021-01-13 ENCOUNTER — TELEPHONE (OUTPATIENT)
Dept: FAMILY MEDICINE | Facility: CLINIC | Age: 66
End: 2021-01-13

## 2021-01-13 DIAGNOSIS — M51.9 LUMBAR DISC DISEASE: Primary | ICD-10-CM

## 2021-01-19 ENCOUNTER — OFFICE VISIT (OUTPATIENT)
Dept: PAIN MEDICINE | Facility: CLINIC | Age: 66
End: 2021-01-19
Payer: COMMERCIAL

## 2021-01-19 ENCOUNTER — PATIENT OUTREACH (OUTPATIENT)
Dept: ADMINISTRATIVE | Facility: OTHER | Age: 66
End: 2021-01-19

## 2021-01-19 VITALS
HEART RATE: 83 BPM | DIASTOLIC BLOOD PRESSURE: 78 MMHG | BODY MASS INDEX: 35.12 KG/M2 | HEIGHT: 61 IN | RESPIRATION RATE: 18 BRPM | WEIGHT: 186 LBS | SYSTOLIC BLOOD PRESSURE: 151 MMHG

## 2021-01-19 DIAGNOSIS — M54.16 LUMBAR RADICULOPATHY: ICD-10-CM

## 2021-01-19 DIAGNOSIS — M54.9 DORSALGIA, UNSPECIFIED: ICD-10-CM

## 2021-01-19 DIAGNOSIS — M51.9 LUMBAR DISC DISEASE: ICD-10-CM

## 2021-01-19 DIAGNOSIS — M47.816 LUMBAR SPONDYLOSIS: Primary | ICD-10-CM

## 2021-01-19 PROCEDURE — 99204 OFFICE O/P NEW MOD 45 MIN: CPT | Mod: S$GLB,,, | Performed by: ANESTHESIOLOGY

## 2021-01-19 PROCEDURE — 1125F PR PAIN SEVERITY QUANTIFIED, PAIN PRESENT: ICD-10-PCS | Mod: S$GLB,,, | Performed by: ANESTHESIOLOGY

## 2021-01-19 PROCEDURE — 1125F AMNT PAIN NOTED PAIN PRSNT: CPT | Mod: S$GLB,,, | Performed by: ANESTHESIOLOGY

## 2021-01-19 PROCEDURE — 99999 PR PBB SHADOW E&M-EST. PATIENT-LVL V: ICD-10-PCS | Mod: PBBFAC,,, | Performed by: ANESTHESIOLOGY

## 2021-01-19 PROCEDURE — 3008F PR BODY MASS INDEX (BMI) DOCUMENTED: ICD-10-PCS | Mod: CPTII,S$GLB,, | Performed by: ANESTHESIOLOGY

## 2021-01-19 PROCEDURE — 99999 PR PBB SHADOW E&M-EST. PATIENT-LVL V: CPT | Mod: PBBFAC,,, | Performed by: ANESTHESIOLOGY

## 2021-01-19 PROCEDURE — 3008F BODY MASS INDEX DOCD: CPT | Mod: CPTII,S$GLB,, | Performed by: ANESTHESIOLOGY

## 2021-01-19 PROCEDURE — 99204 PR OFFICE/OUTPT VISIT, NEW, LEVL IV, 45-59 MIN: ICD-10-PCS | Mod: S$GLB,,, | Performed by: ANESTHESIOLOGY

## 2021-01-19 RX ORDER — GABAPENTIN 300 MG/1
CAPSULE ORAL
Qty: 90 CAPSULE | Refills: 0 | Status: SHIPPED | OUTPATIENT
Start: 2021-01-19 | End: 2021-02-22

## 2021-01-27 ENCOUNTER — HOSPITAL ENCOUNTER (OUTPATIENT)
Dept: RADIOLOGY | Facility: HOSPITAL | Age: 66
Discharge: HOME | End: 2021-01-27
Attending: ANESTHESIOLOGY
Payer: COMMERCIAL

## 2021-01-27 DIAGNOSIS — M54.9 DORSALGIA, UNSPECIFIED: ICD-10-CM

## 2021-01-27 PROCEDURE — 72148 MRI LUMBAR SPINE W/O DYE: CPT | Mod: 26,,, | Performed by: RADIOLOGY

## 2021-01-27 PROCEDURE — 72148 MRI LUMBAR SPINE WITHOUT CONTRAST: ICD-10-PCS | Mod: 26,,, | Performed by: RADIOLOGY

## 2021-01-27 PROCEDURE — 72148 MRI LUMBAR SPINE W/O DYE: CPT | Mod: TC

## 2021-02-01 ENCOUNTER — OFFICE VISIT (OUTPATIENT)
Dept: PAIN MEDICINE | Facility: CLINIC | Age: 66
End: 2021-02-01
Attending: FAMILY MEDICINE
Payer: MEDICARE

## 2021-02-01 ENCOUNTER — HOSPITAL ENCOUNTER (OUTPATIENT)
Dept: RADIOLOGY | Facility: HOSPITAL | Age: 66
Discharge: HOME | End: 2021-02-01
Attending: ANESTHESIOLOGY
Payer: MEDICARE

## 2021-02-01 VITALS
HEART RATE: 79 BPM | BODY MASS INDEX: 35.5 KG/M2 | RESPIRATION RATE: 18 BRPM | WEIGHT: 188 LBS | SYSTOLIC BLOOD PRESSURE: 147 MMHG | DIASTOLIC BLOOD PRESSURE: 76 MMHG | HEIGHT: 61 IN

## 2021-02-01 DIAGNOSIS — M54.16 LUMBAR RADICULOPATHY: ICD-10-CM

## 2021-02-01 DIAGNOSIS — M47.812 CERVICAL SPONDYLOSIS: ICD-10-CM

## 2021-02-01 DIAGNOSIS — M51.9 LUMBAR DISC DISEASE: ICD-10-CM

## 2021-02-01 DIAGNOSIS — M47.812 CERVICAL SPONDYLOSIS: Primary | ICD-10-CM

## 2021-02-01 DIAGNOSIS — M47.816 LUMBAR SPONDYLOSIS: ICD-10-CM

## 2021-02-01 PROCEDURE — 3008F PR BODY MASS INDEX (BMI) DOCUMENTED: ICD-10-PCS | Mod: CPTII,S$GLB,, | Performed by: ANESTHESIOLOGY

## 2021-02-01 PROCEDURE — 99213 OFFICE O/P EST LOW 20 MIN: CPT | Mod: S$GLB,,, | Performed by: ANESTHESIOLOGY

## 2021-02-01 PROCEDURE — 99213 PR OFFICE/OUTPT VISIT, EST, LEVL III, 20-29 MIN: ICD-10-PCS | Mod: S$GLB,,, | Performed by: ANESTHESIOLOGY

## 2021-02-01 PROCEDURE — 72052 XR CERVICAL SPINE 5 VIEW WITH FLEX AND EXT: ICD-10-PCS | Mod: 26,,, | Performed by: RADIOLOGY

## 2021-02-01 PROCEDURE — 99999 PR PBB SHADOW E&M-EST. PATIENT-LVL IV: ICD-10-PCS | Mod: PBBFAC,,, | Performed by: ANESTHESIOLOGY

## 2021-02-01 PROCEDURE — 3008F BODY MASS INDEX DOCD: CPT | Mod: CPTII,S$GLB,, | Performed by: ANESTHESIOLOGY

## 2021-02-01 PROCEDURE — 72052 X-RAY EXAM NECK SPINE 6/>VWS: CPT | Mod: TC

## 2021-02-01 PROCEDURE — 99999 PR PBB SHADOW E&M-EST. PATIENT-LVL IV: CPT | Mod: PBBFAC,,, | Performed by: ANESTHESIOLOGY

## 2021-02-01 PROCEDURE — 72052 X-RAY EXAM NECK SPINE 6/>VWS: CPT | Mod: 26,,, | Performed by: RADIOLOGY

## 2021-02-01 PROCEDURE — 1125F AMNT PAIN NOTED PAIN PRSNT: CPT | Mod: S$GLB,,, | Performed by: ANESTHESIOLOGY

## 2021-02-01 PROCEDURE — 1125F PR PAIN SEVERITY QUANTIFIED, PAIN PRESENT: ICD-10-PCS | Mod: S$GLB,,, | Performed by: ANESTHESIOLOGY

## 2021-02-25 ENCOUNTER — HOSPITAL ENCOUNTER (OUTPATIENT)
Facility: HOSPITAL | Age: 66
Discharge: HOME | End: 2021-02-25
Attending: ANESTHESIOLOGY | Admitting: ANESTHESIOLOGY
Payer: MEDICARE

## 2021-02-25 VITALS
HEIGHT: 61 IN | BODY MASS INDEX: 35.8 KG/M2 | TEMPERATURE: 98 F | RESPIRATION RATE: 18 BRPM | SYSTOLIC BLOOD PRESSURE: 119 MMHG | WEIGHT: 189.63 LBS | DIASTOLIC BLOOD PRESSURE: 62 MMHG | OXYGEN SATURATION: 95 % | HEART RATE: 75 BPM

## 2021-02-25 DIAGNOSIS — M54.16 LUMBAR RADICULOPATHY: Primary | ICD-10-CM

## 2021-02-25 PROCEDURE — 64484 NJX AA&/STRD TFRM EPI L/S EA: CPT | Mod: RT,,, | Performed by: ANESTHESIOLOGY

## 2021-02-25 PROCEDURE — 64484 PRA INJECT ANES/STEROID FORAMEN LUMBAR/SACRAL W IMG GUIDE ,EA ADD LEVEL: ICD-10-PCS | Mod: RT,,, | Performed by: ANESTHESIOLOGY

## 2021-02-25 PROCEDURE — 64483 NJX AA&/STRD TFRM EPI L/S 1: CPT | Mod: RT,,, | Performed by: ANESTHESIOLOGY

## 2021-02-25 PROCEDURE — 64483 NJX AA&/STRD TFRM EPI L/S 1: CPT | Performed by: ANESTHESIOLOGY

## 2021-02-25 PROCEDURE — 99152 MOD SED SAME PHYS/QHP 5/>YRS: CPT | Performed by: ANESTHESIOLOGY

## 2021-02-25 PROCEDURE — 25000003 PHARM REV CODE 250: Performed by: ANESTHESIOLOGY

## 2021-02-25 PROCEDURE — 63600175 PHARM REV CODE 636 W HCPCS: Performed by: ANESTHESIOLOGY

## 2021-02-25 PROCEDURE — 25500020 PHARM REV CODE 255: Performed by: ANESTHESIOLOGY

## 2021-02-25 PROCEDURE — 64484 NJX AA&/STRD TFRM EPI L/S EA: CPT | Performed by: ANESTHESIOLOGY

## 2021-02-25 PROCEDURE — A9585 GADOBUTROL INJECTION: HCPCS | Performed by: ANESTHESIOLOGY

## 2021-02-25 PROCEDURE — 64483 PR EPIDURAL INJ, ANES/STEROID, TRANSFORAMINAL, LUMB/SACR, SNGL LEVL: ICD-10-PCS | Mod: RT,,, | Performed by: ANESTHESIOLOGY

## 2021-02-25 RX ORDER — DEXAMETHASONE SODIUM PHOSPHATE 100 MG/10ML
INJECTION INTRAMUSCULAR; INTRAVENOUS
Status: DISCONTINUED | OUTPATIENT
Start: 2021-02-25 | End: 2021-02-25 | Stop reason: HOSPADM

## 2021-02-25 RX ORDER — FENTANYL CITRATE 50 UG/ML
INJECTION, SOLUTION INTRAMUSCULAR; INTRAVENOUS
Status: DISCONTINUED | OUTPATIENT
Start: 2021-02-25 | End: 2021-02-25 | Stop reason: HOSPADM

## 2021-02-25 RX ORDER — GADOBUTROL 604.72 MG/ML
INJECTION INTRAVENOUS
Status: DISCONTINUED | OUTPATIENT
Start: 2021-02-25 | End: 2021-02-25 | Stop reason: HOSPADM

## 2021-02-25 RX ORDER — MIDAZOLAM HYDROCHLORIDE 1 MG/ML
INJECTION, SOLUTION INTRAMUSCULAR; INTRAVENOUS
Status: DISCONTINUED | OUTPATIENT
Start: 2021-02-25 | End: 2021-02-25 | Stop reason: HOSPADM

## 2021-02-25 RX ORDER — LIDOCAINE HYDROCHLORIDE 10 MG/ML
INJECTION, SOLUTION EPIDURAL; INFILTRATION; INTRACAUDAL; PERINEURAL
Status: DISCONTINUED | OUTPATIENT
Start: 2021-02-25 | End: 2021-02-25 | Stop reason: HOSPADM

## 2021-03-26 ENCOUNTER — PATIENT OUTREACH (OUTPATIENT)
Dept: ADMINISTRATIVE | Facility: OTHER | Age: 66
End: 2021-03-26

## 2021-03-29 ENCOUNTER — PATIENT MESSAGE (OUTPATIENT)
Dept: PAIN MEDICINE | Facility: CLINIC | Age: 66
End: 2021-03-29

## 2021-03-29 ENCOUNTER — OFFICE VISIT (OUTPATIENT)
Dept: PAIN MEDICINE | Facility: CLINIC | Age: 66
End: 2021-03-29
Payer: MEDICARE

## 2021-03-29 VITALS
BODY MASS INDEX: 35.38 KG/M2 | SYSTOLIC BLOOD PRESSURE: 140 MMHG | DIASTOLIC BLOOD PRESSURE: 75 MMHG | WEIGHT: 187.38 LBS | HEART RATE: 73 BPM | HEIGHT: 61 IN

## 2021-03-29 DIAGNOSIS — M54.16 RIGHT LUMBAR RADICULOPATHY: ICD-10-CM

## 2021-03-29 DIAGNOSIS — M54.2 PAIN OF CERVICAL FACET JOINT: ICD-10-CM

## 2021-03-29 DIAGNOSIS — M47.812 CERVICAL SPONDYLOSIS: Primary | ICD-10-CM

## 2021-03-29 DIAGNOSIS — G44.86 CERVICOGENIC HEADACHE: ICD-10-CM

## 2021-03-29 PROCEDURE — 99214 OFFICE O/P EST MOD 30 MIN: CPT | Mod: S$GLB,,, | Performed by: PHYSICIAN ASSISTANT

## 2021-03-29 PROCEDURE — 99214 PR OFFICE/OUTPT VISIT, EST, LEVL IV, 30-39 MIN: ICD-10-PCS | Mod: S$GLB,,, | Performed by: PHYSICIAN ASSISTANT

## 2021-03-29 PROCEDURE — 1125F PR PAIN SEVERITY QUANTIFIED, PAIN PRESENT: ICD-10-PCS | Mod: S$GLB,,, | Performed by: PHYSICIAN ASSISTANT

## 2021-03-29 PROCEDURE — 3008F PR BODY MASS INDEX (BMI) DOCUMENTED: ICD-10-PCS | Mod: CPTII,S$GLB,, | Performed by: PHYSICIAN ASSISTANT

## 2021-03-29 PROCEDURE — 99999 PR PBB SHADOW E&M-EST. PATIENT-LVL V: CPT | Mod: PBBFAC,,, | Performed by: PHYSICIAN ASSISTANT

## 2021-03-29 PROCEDURE — 99999 PR PBB SHADOW E&M-EST. PATIENT-LVL V: ICD-10-PCS | Mod: PBBFAC,,, | Performed by: PHYSICIAN ASSISTANT

## 2021-03-29 PROCEDURE — 1125F AMNT PAIN NOTED PAIN PRSNT: CPT | Mod: S$GLB,,, | Performed by: PHYSICIAN ASSISTANT

## 2021-03-29 PROCEDURE — 3008F BODY MASS INDEX DOCD: CPT | Mod: CPTII,S$GLB,, | Performed by: PHYSICIAN ASSISTANT

## 2021-04-14 ENCOUNTER — HOSPITAL ENCOUNTER (OUTPATIENT)
Facility: HOSPITAL | Age: 66
Discharge: HOME | End: 2021-04-14
Attending: ANESTHESIOLOGY | Admitting: ANESTHESIOLOGY
Payer: MEDICARE

## 2021-04-14 VITALS
HEART RATE: 82 BPM | HEIGHT: 61 IN | TEMPERATURE: 98 F | DIASTOLIC BLOOD PRESSURE: 79 MMHG | WEIGHT: 187.38 LBS | RESPIRATION RATE: 17 BRPM | SYSTOLIC BLOOD PRESSURE: 143 MMHG | OXYGEN SATURATION: 97 % | BODY MASS INDEX: 35.38 KG/M2

## 2021-04-14 DIAGNOSIS — M47.812 CERVICAL FACET JOINT SYNDROME: Primary | ICD-10-CM

## 2021-04-14 PROCEDURE — 64490 PR INJ DX/THER AGNT PARAVERT FACET JOINT,IMG GUIDE,CERV/THORAC, 1ST LEVEL: ICD-10-PCS | Mod: 50,,, | Performed by: ANESTHESIOLOGY

## 2021-04-14 PROCEDURE — 64491 INJ PARAVERT F JNT C/T 2 LEV: CPT | Mod: 50,,, | Performed by: ANESTHESIOLOGY

## 2021-04-14 PROCEDURE — 64490 INJ PARAVERT F JNT C/T 1 LEV: CPT | Mod: 50,,, | Performed by: ANESTHESIOLOGY

## 2021-04-14 PROCEDURE — 64491 INJ PARAVERT F JNT C/T 2 LEV: CPT | Mod: 50 | Performed by: ANESTHESIOLOGY

## 2021-04-14 PROCEDURE — 64492 INJ PARAVERT F JNT C/T 3 LEV: CPT | Mod: 50

## 2021-04-14 PROCEDURE — 64490 INJ PARAVERT F JNT C/T 1 LEV: CPT | Mod: 50 | Performed by: ANESTHESIOLOGY

## 2021-04-14 PROCEDURE — 63600175 PHARM REV CODE 636 W HCPCS: Performed by: ANESTHESIOLOGY

## 2021-04-14 PROCEDURE — 99152 MOD SED SAME PHYS/QHP 5/>YRS: CPT | Performed by: ANESTHESIOLOGY

## 2021-04-14 PROCEDURE — 64491 PR INJ DX/THER AGNT PARAVERT FACET JOINT,IMG GUIDE,CERV/THORAC, 2ND LEVEL: ICD-10-PCS | Mod: 50,,, | Performed by: ANESTHESIOLOGY

## 2021-04-14 PROCEDURE — 25000003 PHARM REV CODE 250: Performed by: ANESTHESIOLOGY

## 2021-04-14 RX ORDER — LIDOCAINE HYDROCHLORIDE 10 MG/ML
INJECTION, SOLUTION EPIDURAL; INFILTRATION; INTRACAUDAL; PERINEURAL
Status: DISCONTINUED | OUTPATIENT
Start: 2021-04-14 | End: 2021-04-14 | Stop reason: HOSPADM

## 2021-04-14 RX ORDER — MIDAZOLAM HYDROCHLORIDE 1 MG/ML
INJECTION, SOLUTION INTRAMUSCULAR; INTRAVENOUS
Status: DISCONTINUED | OUTPATIENT
Start: 2021-04-14 | End: 2021-04-14 | Stop reason: HOSPADM

## 2021-04-14 RX ORDER — LIDOCAINE HYDROCHLORIDE 20 MG/ML
INJECTION, SOLUTION EPIDURAL; INFILTRATION; INTRACAUDAL; PERINEURAL
Status: DISCONTINUED | OUTPATIENT
Start: 2021-04-14 | End: 2021-04-14 | Stop reason: HOSPADM

## 2021-04-14 RX ORDER — FENTANYL CITRATE 50 UG/ML
INJECTION, SOLUTION INTRAMUSCULAR; INTRAVENOUS
Status: DISCONTINUED | OUTPATIENT
Start: 2021-04-14 | End: 2021-04-14 | Stop reason: HOSPADM

## 2021-04-14 RX ORDER — DEXAMETHASONE SODIUM PHOSPHATE 10 MG/ML
INJECTION INTRAMUSCULAR; INTRAVENOUS
Status: DISCONTINUED | OUTPATIENT
Start: 2021-04-14 | End: 2021-04-14 | Stop reason: HOSPADM

## 2021-05-06 ENCOUNTER — TELEPHONE (OUTPATIENT)
Dept: PAIN MEDICINE | Facility: CLINIC | Age: 66
End: 2021-05-06

## 2021-05-19 ENCOUNTER — PATIENT OUTREACH (OUTPATIENT)
Dept: ADMINISTRATIVE | Facility: OTHER | Age: 66
End: 2021-05-19

## 2021-05-23 ENCOUNTER — HOSPITAL ENCOUNTER (EMERGENCY)
Facility: HOSPITAL | Age: 66
Discharge: HOME | End: 2021-05-23
Attending: EMERGENCY MEDICINE
Payer: MEDICARE

## 2021-05-23 VITALS
OXYGEN SATURATION: 95 % | HEART RATE: 76 BPM | TEMPERATURE: 99 F | DIASTOLIC BLOOD PRESSURE: 72 MMHG | SYSTOLIC BLOOD PRESSURE: 132 MMHG | WEIGHT: 197.31 LBS | RESPIRATION RATE: 16 BRPM | BODY MASS INDEX: 37.28 KG/M2

## 2021-05-23 DIAGNOSIS — R42 VERTIGO: Primary | ICD-10-CM

## 2021-05-23 DIAGNOSIS — R53.1 WEAKNESS: ICD-10-CM

## 2021-05-23 LAB
ALBUMIN SERPL BCP-MCNC: 4.2 G/DL (ref 3.5–5.2)
ALP SERPL-CCNC: 67 U/L (ref 55–135)
ALT SERPL W/O P-5'-P-CCNC: 17 U/L (ref 10–44)
ANION GAP SERPL CALC-SCNC: 8 MMOL/L (ref 8–16)
AST SERPL-CCNC: 19 U/L (ref 10–40)
BASOPHILS # BLD AUTO: 0.05 K/UL (ref 0–0.2)
BASOPHILS NFR BLD: 0.6 % (ref 0–1.9)
BILIRUB SERPL-MCNC: 0.5 MG/DL (ref 0.1–1)
BILIRUB UR QL STRIP: NEGATIVE
BUN SERPL-MCNC: 18 MG/DL (ref 8–23)
CALCIUM SERPL-MCNC: 9.5 MG/DL (ref 8.7–10.5)
CHLORIDE SERPL-SCNC: 106 MMOL/L (ref 95–110)
CLARITY UR: CLEAR
CO2 SERPL-SCNC: 28 MMOL/L (ref 23–29)
COLOR UR: YELLOW
CREAT SERPL-MCNC: 0.7 MG/DL (ref 0.5–1.4)
DIFFERENTIAL METHOD: ABNORMAL
EOSINOPHIL # BLD AUTO: 0.1 K/UL (ref 0–0.5)
EOSINOPHIL NFR BLD: 1.2 % (ref 0–8)
ERYTHROCYTE [DISTWIDTH] IN BLOOD BY AUTOMATED COUNT: 12.5 % (ref 11.5–14.5)
EST. GFR  (AFRICAN AMERICAN): >60 ML/MIN/1.73 M^2
EST. GFR  (NON AFRICAN AMERICAN): >60 ML/MIN/1.73 M^2
GLUCOSE SERPL-MCNC: 102 MG/DL (ref 70–110)
GLUCOSE UR QL STRIP: NEGATIVE
HCT VFR BLD AUTO: 41.4 % (ref 37–48.5)
HCV AB SERPL QL IA: NEGATIVE
HEP C VIRUS HOLD SPECIMEN: NORMAL
HGB BLD-MCNC: 13.6 G/DL (ref 12–16)
HGB UR QL STRIP: NEGATIVE
IMM GRANULOCYTES # BLD AUTO: 0.04 K/UL (ref 0–0.04)
IMM GRANULOCYTES NFR BLD AUTO: 0.5 % (ref 0–0.5)
KETONES UR QL STRIP: ABNORMAL
LEUKOCYTE ESTERASE UR QL STRIP: NEGATIVE
LIPASE SERPL-CCNC: 15 U/L (ref 4–60)
LYMPHOCYTES # BLD AUTO: 1.2 K/UL (ref 1–4.8)
LYMPHOCYTES NFR BLD: 13.3 % (ref 18–48)
MCH RBC QN AUTO: 29.4 PG (ref 27–31)
MCHC RBC AUTO-ENTMCNC: 32.9 G/DL (ref 32–36)
MCV RBC AUTO: 90 FL (ref 82–98)
MONOCYTES # BLD AUTO: 0.4 K/UL (ref 0.3–1)
MONOCYTES NFR BLD: 5 % (ref 4–15)
NEUTROPHILS # BLD AUTO: 7 K/UL (ref 1.8–7.7)
NEUTROPHILS NFR BLD: 79.4 % (ref 38–73)
NITRITE UR QL STRIP: NEGATIVE
NRBC BLD-RTO: 0 /100 WBC
PH UR STRIP: 6 [PH] (ref 5–8)
PLATELET # BLD AUTO: 216 K/UL (ref 150–450)
PMV BLD AUTO: 9 FL (ref 9.2–12.9)
POTASSIUM SERPL-SCNC: 4.3 MMOL/L (ref 3.5–5.1)
PROT SERPL-MCNC: 7.7 G/DL (ref 6–8.4)
PROT UR QL STRIP: NEGATIVE
RBC # BLD AUTO: 4.62 M/UL (ref 4–5.4)
SODIUM SERPL-SCNC: 142 MMOL/L (ref 136–145)
SP GR UR STRIP: >=1.03 (ref 1–1.03)
TROPONIN I SERPL DL<=0.01 NG/ML-MCNC: 0.05 NG/ML (ref 0–0.03)
TROPONIN I SERPL DL<=0.01 NG/ML-MCNC: <0.006 NG/ML (ref 0–0.03)
URN SPEC COLLECT METH UR: ABNORMAL
UROBILINOGEN UR STRIP-ACNC: 1 EU/DL
WBC # BLD AUTO: 8.85 K/UL (ref 3.9–12.7)

## 2021-05-23 PROCEDURE — 85025 COMPLETE CBC W/AUTO DIFF WBC: CPT | Performed by: EMERGENCY MEDICINE

## 2021-05-23 PROCEDURE — 25000003 PHARM REV CODE 250: Performed by: EMERGENCY MEDICINE

## 2021-05-23 PROCEDURE — 36415 COLL VENOUS BLD VENIPUNCTURE: CPT | Performed by: EMERGENCY MEDICINE

## 2021-05-23 PROCEDURE — 80053 COMPREHEN METABOLIC PANEL: CPT | Performed by: EMERGENCY MEDICINE

## 2021-05-23 PROCEDURE — 83690 ASSAY OF LIPASE: CPT | Performed by: EMERGENCY MEDICINE

## 2021-05-23 PROCEDURE — 99285 EMERGENCY DEPT VISIT HI MDM: CPT | Mod: 25

## 2021-05-23 PROCEDURE — 93010 EKG 12-LEAD: ICD-10-PCS | Mod: ,,, | Performed by: INTERNAL MEDICINE

## 2021-05-23 PROCEDURE — 86803 HEPATITIS C AB TEST: CPT | Performed by: EMERGENCY MEDICINE

## 2021-05-23 PROCEDURE — 93005 ELECTROCARDIOGRAM TRACING: CPT

## 2021-05-23 PROCEDURE — 93010 ELECTROCARDIOGRAM REPORT: CPT | Mod: ,,, | Performed by: INTERNAL MEDICINE

## 2021-05-23 PROCEDURE — 81003 URINALYSIS AUTO W/O SCOPE: CPT | Performed by: EMERGENCY MEDICINE

## 2021-05-23 PROCEDURE — 84484 ASSAY OF TROPONIN QUANT: CPT | Performed by: EMERGENCY MEDICINE

## 2021-05-23 RX ORDER — MECLIZINE HYDROCHLORIDE 25 MG/1
25 TABLET ORAL 3 TIMES DAILY PRN
Qty: 20 TABLET | Refills: 0 | Status: SHIPPED | OUTPATIENT
Start: 2021-05-23 | End: 2022-12-02

## 2021-05-23 RX ORDER — MECLIZINE HYDROCHLORIDE 25 MG/1
25 TABLET ORAL
Status: COMPLETED | OUTPATIENT
Start: 2021-05-23 | End: 2021-05-23

## 2021-05-23 RX ADMIN — MECLIZINE HYDROCHLORIDE 25 MG: 25 TABLET ORAL at 09:05

## 2021-05-24 ENCOUNTER — PATIENT MESSAGE (OUTPATIENT)
Dept: FAMILY MEDICINE | Facility: CLINIC | Age: 66
End: 2021-05-24

## 2021-05-25 ENCOUNTER — OFFICE VISIT (OUTPATIENT)
Dept: OPHTHALMOLOGY | Facility: CLINIC | Age: 66
End: 2021-05-25
Payer: MEDICARE

## 2021-05-25 DIAGNOSIS — H35.363 FAMILIAL DRUSEN OF BOTH EYES: Primary | ICD-10-CM

## 2021-05-25 DIAGNOSIS — H35.3133 ADVANCED ATROPHIC NONEXUDATIVE AGE-RELATED MACULAR DEGENERATION OF BOTH EYES WITHOUT SUBFOVEAL INVOLVEMENT: ICD-10-CM

## 2021-05-25 PROCEDURE — 99213 PR OFFICE/OUTPT VISIT, EST, LEVL III, 20-29 MIN: ICD-10-PCS | Mod: S$GLB,,, | Performed by: OPHTHALMOLOGY

## 2021-05-25 PROCEDURE — 92134 POSTERIOR SEGMENT OCT RETINA (OCULAR COHERENCE TOMOGRAPHY)-BOTH EYES: ICD-10-PCS | Mod: S$GLB,,, | Performed by: OPHTHALMOLOGY

## 2021-05-25 PROCEDURE — 92134 CPTRZ OPH DX IMG PST SGM RTA: CPT | Mod: S$GLB,,, | Performed by: OPHTHALMOLOGY

## 2021-05-25 PROCEDURE — 99999 PR PBB SHADOW E&M-EST. PATIENT-LVL III: ICD-10-PCS | Mod: PBBFAC,,, | Performed by: OPHTHALMOLOGY

## 2021-05-25 PROCEDURE — 99999 PR PBB SHADOW E&M-EST. PATIENT-LVL III: CPT | Mod: PBBFAC,,, | Performed by: OPHTHALMOLOGY

## 2021-05-25 PROCEDURE — 99213 OFFICE O/P EST LOW 20 MIN: CPT | Mod: S$GLB,,, | Performed by: OPHTHALMOLOGY

## 2021-06-03 ENCOUNTER — OFFICE VISIT (OUTPATIENT)
Dept: PAIN MEDICINE | Facility: CLINIC | Age: 66
End: 2021-06-03
Payer: MEDICARE

## 2021-06-03 VITALS
SYSTOLIC BLOOD PRESSURE: 157 MMHG | HEART RATE: 80 BPM | BODY MASS INDEX: 35.12 KG/M2 | HEIGHT: 61 IN | RESPIRATION RATE: 18 BRPM | WEIGHT: 186 LBS | DIASTOLIC BLOOD PRESSURE: 77 MMHG

## 2021-06-03 DIAGNOSIS — M47.812 CERVICAL FACET JOINT SYNDROME: Primary | ICD-10-CM

## 2021-06-03 DIAGNOSIS — M54.2 PAIN OF CERVICAL FACET JOINT: ICD-10-CM

## 2021-06-03 DIAGNOSIS — M47.812 CERVICAL SPONDYLOSIS: ICD-10-CM

## 2021-06-03 PROCEDURE — 3008F BODY MASS INDEX DOCD: CPT | Mod: CPTII,S$GLB,, | Performed by: PHYSICIAN ASSISTANT

## 2021-06-03 PROCEDURE — 99214 OFFICE O/P EST MOD 30 MIN: CPT | Mod: S$GLB,,, | Performed by: PHYSICIAN ASSISTANT

## 2021-06-03 PROCEDURE — 3008F PR BODY MASS INDEX (BMI) DOCUMENTED: ICD-10-PCS | Mod: CPTII,S$GLB,, | Performed by: PHYSICIAN ASSISTANT

## 2021-06-03 PROCEDURE — 99214 PR OFFICE/OUTPT VISIT, EST, LEVL IV, 30-39 MIN: ICD-10-PCS | Mod: S$GLB,,, | Performed by: PHYSICIAN ASSISTANT

## 2021-06-03 PROCEDURE — 99999 PR PBB SHADOW E&M-EST. PATIENT-LVL III: CPT | Mod: PBBFAC,,, | Performed by: PHYSICIAN ASSISTANT

## 2021-06-03 PROCEDURE — 1125F AMNT PAIN NOTED PAIN PRSNT: CPT | Mod: S$GLB,,, | Performed by: PHYSICIAN ASSISTANT

## 2021-06-03 PROCEDURE — 1125F PR PAIN SEVERITY QUANTIFIED, PAIN PRESENT: ICD-10-PCS | Mod: S$GLB,,, | Performed by: PHYSICIAN ASSISTANT

## 2021-06-03 PROCEDURE — 99999 PR PBB SHADOW E&M-EST. PATIENT-LVL III: ICD-10-PCS | Mod: PBBFAC,,, | Performed by: PHYSICIAN ASSISTANT

## 2021-06-08 ENCOUNTER — HOSPITAL ENCOUNTER (OUTPATIENT)
Dept: RADIOLOGY | Facility: HOSPITAL | Age: 66
Discharge: HOME | End: 2021-06-08
Attending: INTERNAL MEDICINE
Payer: MEDICARE

## 2021-06-08 ENCOUNTER — OFFICE VISIT (OUTPATIENT)
Dept: FAMILY MEDICINE | Facility: CLINIC | Age: 66
End: 2021-06-08
Payer: MEDICARE

## 2021-06-08 VITALS
BODY MASS INDEX: 32.38 KG/M2 | SYSTOLIC BLOOD PRESSURE: 134 MMHG | WEIGHT: 171.5 LBS | HEART RATE: 91 BPM | TEMPERATURE: 98 F | HEIGHT: 61 IN | OXYGEN SATURATION: 98 % | DIASTOLIC BLOOD PRESSURE: 74 MMHG

## 2021-06-08 DIAGNOSIS — K21.9 GASTROESOPHAGEAL REFLUX DISEASE WITHOUT ESOPHAGITIS: ICD-10-CM

## 2021-06-08 DIAGNOSIS — M54.16 LUMBAR RADICULOPATHY: ICD-10-CM

## 2021-06-08 DIAGNOSIS — M47.812 CERVICAL FACET JOINT SYNDROME: ICD-10-CM

## 2021-06-08 DIAGNOSIS — Z00.00 ENCOUNTER FOR PREVENTIVE HEALTH EXAMINATION: ICD-10-CM

## 2021-06-08 DIAGNOSIS — E78.2 MIXED HYPERLIPIDEMIA: Primary | Chronic | ICD-10-CM

## 2021-06-08 DIAGNOSIS — E55.9 VITAMIN D DEFICIENCY: ICD-10-CM

## 2021-06-08 DIAGNOSIS — F41.8 MIXED ANXIETY AND DEPRESSIVE DISORDER: ICD-10-CM

## 2021-06-08 DIAGNOSIS — R63.4 WEIGHT LOSS: ICD-10-CM

## 2021-06-08 PROCEDURE — 99999 PR PBB SHADOW E&M-EST. PATIENT-LVL V: CPT | Mod: PBBFAC,,, | Performed by: INTERNAL MEDICINE

## 2021-06-08 PROCEDURE — 90732 PNEUMOCOCCAL POLYSACCHARIDE VACCINE 23-VALENT =>2YO SQ IM: ICD-10-PCS | Mod: S$GLB,,, | Performed by: INTERNAL MEDICINE

## 2021-06-08 PROCEDURE — 99214 OFFICE O/P EST MOD 30 MIN: CPT | Mod: 25,S$GLB,, | Performed by: INTERNAL MEDICINE

## 2021-06-08 PROCEDURE — 3288F PR FALLS RISK ASSESSMENT DOCUMENTED: ICD-10-PCS | Mod: CPTII,S$GLB,, | Performed by: INTERNAL MEDICINE

## 2021-06-08 PROCEDURE — 1125F AMNT PAIN NOTED PAIN PRSNT: CPT | Mod: S$GLB,,, | Performed by: INTERNAL MEDICINE

## 2021-06-08 PROCEDURE — 1101F PR PT FALLS ASSESS DOC 0-1 FALLS W/OUT INJ PAST YR: ICD-10-PCS | Mod: CPTII,S$GLB,, | Performed by: INTERNAL MEDICINE

## 2021-06-08 PROCEDURE — 1125F PR PAIN SEVERITY QUANTIFIED, PAIN PRESENT: ICD-10-PCS | Mod: S$GLB,,, | Performed by: INTERNAL MEDICINE

## 2021-06-08 PROCEDURE — G0009 PNEUMOCOCCAL POLYSACCHARIDE VACCINE 23-VALENT =>2YO SQ IM: ICD-10-PCS | Mod: S$GLB,,, | Performed by: INTERNAL MEDICINE

## 2021-06-08 PROCEDURE — 3288F FALL RISK ASSESSMENT DOCD: CPT | Mod: CPTII,S$GLB,, | Performed by: INTERNAL MEDICINE

## 2021-06-08 PROCEDURE — 99999 PR PBB SHADOW E&M-EST. PATIENT-LVL V: ICD-10-PCS | Mod: PBBFAC,,, | Performed by: INTERNAL MEDICINE

## 2021-06-08 PROCEDURE — 71046 X-RAY EXAM CHEST 2 VIEWS: CPT | Mod: TC,FY,PO

## 2021-06-08 PROCEDURE — 3008F PR BODY MASS INDEX (BMI) DOCUMENTED: ICD-10-PCS | Mod: CPTII,S$GLB,, | Performed by: INTERNAL MEDICINE

## 2021-06-08 PROCEDURE — 3008F BODY MASS INDEX DOCD: CPT | Mod: CPTII,S$GLB,, | Performed by: INTERNAL MEDICINE

## 2021-06-08 PROCEDURE — 90472 IMMUNIZATION ADMIN EACH ADD: CPT | Mod: S$GLB,,, | Performed by: INTERNAL MEDICINE

## 2021-06-08 PROCEDURE — 90750 ZOSTER RECOMBINANT VACCINE: ICD-10-PCS | Mod: S$GLB,,, | Performed by: INTERNAL MEDICINE

## 2021-06-08 PROCEDURE — 71046 XR CHEST PA AND LATERAL: ICD-10-PCS | Mod: 26,,, | Performed by: RADIOLOGY

## 2021-06-08 PROCEDURE — 90732 PPSV23 VACC 2 YRS+ SUBQ/IM: CPT | Mod: S$GLB,,, | Performed by: INTERNAL MEDICINE

## 2021-06-08 PROCEDURE — 1101F PT FALLS ASSESS-DOCD LE1/YR: CPT | Mod: CPTII,S$GLB,, | Performed by: INTERNAL MEDICINE

## 2021-06-08 PROCEDURE — 90750 HZV VACC RECOMBINANT IM: CPT | Mod: S$GLB,,, | Performed by: INTERNAL MEDICINE

## 2021-06-08 PROCEDURE — 90472 ZOSTER RECOMBINANT VACCINE: ICD-10-PCS | Mod: S$GLB,,, | Performed by: INTERNAL MEDICINE

## 2021-06-08 PROCEDURE — 71046 X-RAY EXAM CHEST 2 VIEWS: CPT | Mod: 26,,, | Performed by: RADIOLOGY

## 2021-06-08 PROCEDURE — 99214 PR OFFICE/OUTPT VISIT, EST, LEVL IV, 30-39 MIN: ICD-10-PCS | Mod: 25,S$GLB,, | Performed by: INTERNAL MEDICINE

## 2021-06-08 PROCEDURE — G0009 ADMIN PNEUMOCOCCAL VACCINE: HCPCS | Mod: S$GLB,,, | Performed by: INTERNAL MEDICINE

## 2021-06-09 ENCOUNTER — PATIENT MESSAGE (OUTPATIENT)
Dept: FAMILY MEDICINE | Facility: CLINIC | Age: 66
End: 2021-06-09

## 2021-06-10 ENCOUNTER — PATIENT MESSAGE (OUTPATIENT)
Dept: FAMILY MEDICINE | Facility: CLINIC | Age: 66
End: 2021-06-10

## 2021-07-13 ENCOUNTER — HOSPITAL ENCOUNTER (OUTPATIENT)
Dept: RADIOLOGY | Facility: HOSPITAL | Age: 66
Discharge: HOME | End: 2021-07-13
Attending: INTERNAL MEDICINE
Payer: MEDICARE

## 2021-07-13 VITALS — HEIGHT: 61 IN | BODY MASS INDEX: 32.38 KG/M2 | WEIGHT: 171.5 LBS

## 2021-07-13 DIAGNOSIS — Z29.9 PREVENTIVE MEASURE: ICD-10-CM

## 2021-07-13 DIAGNOSIS — Z12.31 ENCOUNTER FOR SCREENING MAMMOGRAM FOR MALIGNANT NEOPLASM OF BREAST: ICD-10-CM

## 2021-07-13 PROCEDURE — 77067 MAMMO DIGITAL SCREENING BILAT WITH TOMO: ICD-10-PCS | Mod: 26,,, | Performed by: RADIOLOGY

## 2021-07-13 PROCEDURE — 77067 SCR MAMMO BI INCL CAD: CPT | Mod: 26,,, | Performed by: RADIOLOGY

## 2021-07-13 PROCEDURE — 77063 BREAST TOMOSYNTHESIS BI: CPT | Mod: 26,,, | Performed by: RADIOLOGY

## 2021-07-13 PROCEDURE — 77063 MAMMO DIGITAL SCREENING BILAT WITH TOMO: ICD-10-PCS | Mod: 26,,, | Performed by: RADIOLOGY

## 2021-07-13 PROCEDURE — 77067 SCR MAMMO BI INCL CAD: CPT | Mod: TC

## 2021-07-28 ENCOUNTER — PATIENT MESSAGE (OUTPATIENT)
Dept: FAMILY MEDICINE | Facility: CLINIC | Age: 66
End: 2021-07-28

## 2021-07-29 ENCOUNTER — PATIENT MESSAGE (OUTPATIENT)
Dept: FAMILY MEDICINE | Facility: CLINIC | Age: 66
End: 2021-07-29

## 2021-07-29 DIAGNOSIS — R19.7 DIARRHEA, UNSPECIFIED TYPE: Primary | ICD-10-CM

## 2021-07-30 ENCOUNTER — IMMUNIZATION (OUTPATIENT)
Dept: INTERNAL MEDICINE | Facility: CLINIC | Age: 66
End: 2021-07-30
Payer: MEDICARE

## 2021-07-30 DIAGNOSIS — Z23 NEED FOR VACCINATION: Primary | ICD-10-CM

## 2021-07-30 PROCEDURE — 91300 COVID-19, MRNA, LNP-S, PF, 30 MCG/0.3 ML DOSE VACCINE: CPT | Mod: HCNC,PBBFAC | Performed by: FAMILY MEDICINE

## 2021-08-04 ENCOUNTER — OFFICE VISIT (OUTPATIENT)
Dept: GASTROENTEROLOGY | Facility: CLINIC | Age: 66
End: 2021-08-04
Payer: MEDICARE

## 2021-08-04 VITALS
SYSTOLIC BLOOD PRESSURE: 148 MMHG | OXYGEN SATURATION: 98 % | HEIGHT: 61 IN | DIASTOLIC BLOOD PRESSURE: 80 MMHG | HEART RATE: 73 BPM | BODY MASS INDEX: 35.43 KG/M2 | WEIGHT: 187.63 LBS

## 2021-08-04 DIAGNOSIS — R19.7 DIARRHEA, UNSPECIFIED TYPE: Primary | ICD-10-CM

## 2021-08-04 DIAGNOSIS — R10.30 LOWER ABDOMINAL PAIN: ICD-10-CM

## 2021-08-04 PROCEDURE — 3008F BODY MASS INDEX DOCD: CPT | Mod: CPTII,S$GLB,, | Performed by: NURSE PRACTITIONER

## 2021-08-04 PROCEDURE — 3077F SYST BP >= 140 MM HG: CPT | Mod: CPTII,S$GLB,, | Performed by: NURSE PRACTITIONER

## 2021-08-04 PROCEDURE — 1159F MED LIST DOCD IN RCRD: CPT | Mod: CPTII,S$GLB,, | Performed by: NURSE PRACTITIONER

## 2021-08-04 PROCEDURE — 1125F AMNT PAIN NOTED PAIN PRSNT: CPT | Mod: CPTII,S$GLB,, | Performed by: NURSE PRACTITIONER

## 2021-08-04 PROCEDURE — 1125F PR PAIN SEVERITY QUANTIFIED, PAIN PRESENT: ICD-10-PCS | Mod: CPTII,S$GLB,, | Performed by: NURSE PRACTITIONER

## 2021-08-04 PROCEDURE — 3077F PR MOST RECENT SYSTOLIC BLOOD PRESSURE >= 140 MM HG: ICD-10-PCS | Mod: CPTII,S$GLB,, | Performed by: NURSE PRACTITIONER

## 2021-08-04 PROCEDURE — 99999 PR PBB SHADOW E&M-EST. PATIENT-LVL V: ICD-10-PCS | Mod: PBBFAC,,, | Performed by: NURSE PRACTITIONER

## 2021-08-04 PROCEDURE — 3079F DIAST BP 80-89 MM HG: CPT | Mod: CPTII,S$GLB,, | Performed by: NURSE PRACTITIONER

## 2021-08-04 PROCEDURE — 1159F PR MEDICATION LIST DOCUMENTED IN MEDICAL RECORD: ICD-10-PCS | Mod: CPTII,S$GLB,, | Performed by: NURSE PRACTITIONER

## 2021-08-04 PROCEDURE — 3008F PR BODY MASS INDEX (BMI) DOCUMENTED: ICD-10-PCS | Mod: CPTII,S$GLB,, | Performed by: NURSE PRACTITIONER

## 2021-08-04 PROCEDURE — 1160F RVW MEDS BY RX/DR IN RCRD: CPT | Mod: CPTII,S$GLB,, | Performed by: NURSE PRACTITIONER

## 2021-08-04 PROCEDURE — 99204 OFFICE O/P NEW MOD 45 MIN: CPT | Mod: S$GLB,,, | Performed by: NURSE PRACTITIONER

## 2021-08-04 PROCEDURE — 3079F PR MOST RECENT DIASTOLIC BLOOD PRESSURE 80-89 MM HG: ICD-10-PCS | Mod: CPTII,S$GLB,, | Performed by: NURSE PRACTITIONER

## 2021-08-04 PROCEDURE — 1160F PR REVIEW ALL MEDS BY PRESCRIBER/CLIN PHARMACIST DOCUMENTED: ICD-10-PCS | Mod: CPTII,S$GLB,, | Performed by: NURSE PRACTITIONER

## 2021-08-04 PROCEDURE — 99204 PR OFFICE/OUTPT VISIT, NEW, LEVL IV, 45-59 MIN: ICD-10-PCS | Mod: S$GLB,,, | Performed by: NURSE PRACTITIONER

## 2021-08-04 PROCEDURE — 99999 PR PBB SHADOW E&M-EST. PATIENT-LVL V: CPT | Mod: PBBFAC,,, | Performed by: NURSE PRACTITIONER

## 2021-08-04 RX ORDER — CHOLESTYRAMINE 4 G/9G
4 POWDER, FOR SUSPENSION ORAL DAILY PRN
Qty: 30 PACKET | Refills: 0 | Status: SHIPPED | OUTPATIENT
Start: 2021-08-04 | End: 2021-08-06 | Stop reason: SDUPTHER

## 2021-08-05 ENCOUNTER — LAB VISIT (OUTPATIENT)
Dept: LAB | Facility: HOSPITAL | Age: 66
End: 2021-08-05
Attending: NURSE PRACTITIONER
Payer: MEDICARE

## 2021-08-05 DIAGNOSIS — R19.7 DIARRHEA, UNSPECIFIED TYPE: ICD-10-CM

## 2021-08-05 PROCEDURE — 89055 LEUKOCYTE ASSESSMENT FECAL: CPT | Performed by: NURSE PRACTITIONER

## 2021-08-05 PROCEDURE — 87045 FECES CULTURE AEROBIC BACT: CPT | Performed by: NURSE PRACTITIONER

## 2021-08-05 PROCEDURE — 87046 STOOL CULTR AEROBIC BACT EA: CPT | Mod: 59 | Performed by: NURSE PRACTITIONER

## 2021-08-05 PROCEDURE — 87324 CLOSTRIDIUM AG IA: CPT | Performed by: NURSE PRACTITIONER

## 2021-08-05 PROCEDURE — 87329 GIARDIA AG IA: CPT | Performed by: NURSE PRACTITIONER

## 2021-08-05 PROCEDURE — 87427 SHIGA-LIKE TOXIN AG IA: CPT | Performed by: NURSE PRACTITIONER

## 2021-08-05 PROCEDURE — 82656 EL-1 FECAL QUAL/SEMIQ: CPT | Performed by: NURSE PRACTITIONER

## 2021-08-05 PROCEDURE — 87449 NOS EACH ORGANISM AG IA: CPT | Performed by: NURSE PRACTITIONER

## 2021-08-05 PROCEDURE — 83993 ASSAY FOR CALPROTECTIN FECAL: CPT | Performed by: NURSE PRACTITIONER

## 2021-08-05 PROCEDURE — 87209 SMEAR COMPLEX STAIN: CPT | Performed by: NURSE PRACTITIONER

## 2021-08-06 DIAGNOSIS — R19.7 DIARRHEA, UNSPECIFIED TYPE: ICD-10-CM

## 2021-08-06 LAB
C DIFF GDH STL QL: NEGATIVE
C DIFF TOX A+B STL QL IA: NEGATIVE
CRYPTOSP AG STL QL IA: NEGATIVE
G LAMBLIA AG STL QL IA: NEGATIVE
WBC #/AREA STL HPF: NORMAL /[HPF]

## 2021-08-06 RX ORDER — CHOLESTYRAMINE 4 G/9G
4 POWDER, FOR SUSPENSION ORAL DAILY PRN
Qty: 30 PACKET | Refills: 0 | Status: SHIPPED | OUTPATIENT
Start: 2021-08-06 | End: 2021-08-18 | Stop reason: SDUPTHER

## 2021-08-07 LAB — ELASTASE 1, FECAL: 236 MCG/G

## 2021-08-08 LAB
E COLI SXT1 STL QL IA: NEGATIVE
E COLI SXT2 STL QL IA: NEGATIVE

## 2021-08-09 LAB
BACTERIA STL CULT: NORMAL
O+P STL MICRO: NORMAL

## 2021-08-10 LAB — CALPROTECTIN STL-MCNT: 41.1 MCG/G

## 2021-08-18 DIAGNOSIS — R19.7 DIARRHEA, UNSPECIFIED TYPE: ICD-10-CM

## 2021-08-18 RX ORDER — CHOLESTYRAMINE 4 G/9G
4 POWDER, FOR SUSPENSION ORAL DAILY PRN
Qty: 90 PACKET | Refills: 0 | Status: SHIPPED | OUTPATIENT
Start: 2021-08-18 | End: 2022-12-02

## 2021-08-20 ENCOUNTER — IMMUNIZATION (OUTPATIENT)
Dept: PRIMARY CARE CLINIC | Facility: CLINIC | Age: 66
End: 2021-08-20
Payer: MEDICARE

## 2021-08-20 DIAGNOSIS — Z23 NEED FOR VACCINATION: Primary | ICD-10-CM

## 2021-08-20 PROCEDURE — 91300 COVID-19, MRNA, LNP-S, PF, 30 MCG/0.3 ML DOSE VACCINE: ICD-10-PCS | Mod: S$GLB,,, | Performed by: FAMILY MEDICINE

## 2021-08-20 PROCEDURE — 0002A COVID-19, MRNA, LNP-S, PF, 30 MCG/0.3 ML DOSE VACCINE: CPT | Mod: CV19,S$GLB,, | Performed by: FAMILY MEDICINE

## 2021-08-20 PROCEDURE — 0002A COVID-19, MRNA, LNP-S, PF, 30 MCG/0.3 ML DOSE VACCINE: ICD-10-PCS | Mod: CV19,S$GLB,, | Performed by: FAMILY MEDICINE

## 2021-08-20 PROCEDURE — 91300 COVID-19, MRNA, LNP-S, PF, 30 MCG/0.3 ML DOSE VACCINE: CPT | Mod: S$GLB,,, | Performed by: FAMILY MEDICINE

## 2021-10-23 ENCOUNTER — PATIENT MESSAGE (OUTPATIENT)
Dept: FAMILY MEDICINE | Facility: CLINIC | Age: 66
End: 2021-10-23
Payer: COMMERCIAL

## 2022-01-07 ENCOUNTER — PATIENT MESSAGE (OUTPATIENT)
Dept: OPHTHALMOLOGY | Facility: CLINIC | Age: 67
End: 2022-01-07
Payer: COMMERCIAL

## 2022-01-20 ENCOUNTER — PATIENT OUTREACH (OUTPATIENT)
Dept: ADMINISTRATIVE | Facility: OTHER | Age: 67
End: 2022-01-20
Payer: COMMERCIAL

## 2022-01-21 ENCOUNTER — OFFICE VISIT (OUTPATIENT)
Dept: OPHTHALMOLOGY | Facility: CLINIC | Age: 67
End: 2022-01-21
Payer: MEDICARE

## 2022-01-21 DIAGNOSIS — H35.3133 ADVANCED ATROPHIC NONEXUDATIVE AGE-RELATED MACULAR DEGENERATION OF BOTH EYES WITHOUT SUBFOVEAL INVOLVEMENT: Primary | ICD-10-CM

## 2022-01-21 DIAGNOSIS — H52.13 MYOPIA OF BOTH EYES: ICD-10-CM

## 2022-01-21 PROCEDURE — 92015 PR REFRACTION: ICD-10-PCS | Mod: HCNC,S$GLB,, | Performed by: OPTOMETRIST

## 2022-01-21 PROCEDURE — 1126F PR PAIN SEVERITY QUANTIFIED, NO PAIN PRESENT: ICD-10-PCS | Mod: HCNC,CPTII,S$GLB, | Performed by: OPTOMETRIST

## 2022-01-21 PROCEDURE — 92015 DETERMINE REFRACTIVE STATE: CPT | Mod: HCNC,S$GLB,, | Performed by: OPTOMETRIST

## 2022-01-21 PROCEDURE — 99999 PR PBB SHADOW E&M-EST. PATIENT-LVL III: ICD-10-PCS | Mod: PBBFAC,HCNC,, | Performed by: OPTOMETRIST

## 2022-01-21 PROCEDURE — 99999 PR PBB SHADOW E&M-EST. PATIENT-LVL III: CPT | Mod: PBBFAC,HCNC,, | Performed by: OPTOMETRIST

## 2022-01-21 PROCEDURE — 92012 INTRM OPH EXAM EST PATIENT: CPT | Mod: HCNC,S$GLB,, | Performed by: OPTOMETRIST

## 2022-01-21 PROCEDURE — 1159F PR MEDICATION LIST DOCUMENTED IN MEDICAL RECORD: ICD-10-PCS | Mod: HCNC,CPTII,S$GLB, | Performed by: OPTOMETRIST

## 2022-01-21 PROCEDURE — 1159F MED LIST DOCD IN RCRD: CPT | Mod: HCNC,CPTII,S$GLB, | Performed by: OPTOMETRIST

## 2022-01-21 PROCEDURE — 92012 PR EYE EXAM, EST PATIENT,INTERMED: ICD-10-PCS | Mod: HCNC,S$GLB,, | Performed by: OPTOMETRIST

## 2022-01-21 PROCEDURE — 1126F AMNT PAIN NOTED NONE PRSNT: CPT | Mod: HCNC,CPTII,S$GLB, | Performed by: OPTOMETRIST

## 2022-01-21 RX ORDER — LOVASTATIN 10 MG/1
TABLET ORAL
COMMUNITY
End: 2022-05-18

## 2022-01-21 NOTE — PROGRESS NOTES
Health Maintenance Due   Topic Date Due    Influenza Vaccine (1) 09/01/2021     Updates were requested from care everywhere.  Chart was reviewed for overdue Proactive [facility] Encounters ([facility]) topics (CRS, Breast Cancer Screening, Eye exam)  Health Maintenance has been updated.  LINKS immunization registry triggered.  Immunizations were reconciled.

## 2022-01-21 NOTE — Clinical Note
Can we please get this patient in with Dr Tobin as she feels her Dry AMD is progressing.Thanks!  DT

## 2022-01-24 NOTE — PROGRESS NOTES
HPI     Patient reports has a history of AMD.  Patient reports floaters OS. Patient sates last seen by Dr. Tobin   5/25/2021.   Patient reports [facility] has changed since last eye exam.     Familial drusen  blepharitis      Last edited by Elfego Sauer on 1/21/2022  2:49 PM. (History)            Assessment /Plan     For exam results, see Encounter Report.    Advanced atrophic nonexudative age-related macular degeneration of both eyes without subfoveal involvement  Mrx stable, consult Dr Tobin for possible progression. No obvious SRF noted on examination today.     Myopia of both eyes  MRx provided full time.  Eyeglass Final Rx     Eyeglass Final Rx       Sphere Cylinder Axis Add    Right -5.00 +0.50 180 +2.50    Left -7.00 +1.25 180 +2.50                  RTC with Dr Tobin next available for DFE.

## 2022-01-25 ENCOUNTER — PATIENT MESSAGE (OUTPATIENT)
Dept: OPHTHALMOLOGY | Facility: CLINIC | Age: 67
End: 2022-01-25
Payer: COMMERCIAL

## 2022-02-10 NOTE — PROGRESS NOTES
===============================  Date today is 2/11/2022  Mouna Chandra is a 67 y.o. female  Last visit [facility]: :5/25/2021   Last visit eye dept. 1/21/2022    Corrected distance visual acuity was 20/30 in the right eye and 20/25 in the left eye.  Tonometry     Tonometry (Applanation, 8:56 AM)       Right Left    Pressure 16 16              Not recorded       Not recorded       Not recorded       Chief Complaint   Patient presents with    Spots and/or Floaters    drusen     Floaters and drusen eval per dr. reyes       Problem List Items Addressed This Visit        Eye/Vision problems    Familial drusen of both eyes    Relevant Orders    Posterior Segment OCT Retina-Both eyes (Completed)      Other Visit Diagnoses     PVD (posterior vitreous detachment), left eye    -  Primary    Relevant Orders    Posterior Segment OCT Retina-Both eyes (Completed)          ________________  2/11/2022 today    Floaters OS  PVD OS with Bautista ring  C/d OD 0.3   Inferior fibrotic and pigmented vitreous base   Myopic degeneration with geographic atrophy  MOCT worsened compared to 2013, no change since last month  Recommend AREDS  C/d OS 0.3  [place] Geographic atrophy OS  ERM OS    RTC 1 year repeat MOCT  Instructed to call 24/7 for any worsening of vision or symptoms. Check OU daily.   Gave my office and cell phone number.    .      ===============================

## 2022-02-11 ENCOUNTER — OFFICE VISIT (OUTPATIENT)
Dept: OPHTHALMOLOGY | Facility: CLINIC | Age: 67
End: 2022-02-11
Payer: MEDICARE

## 2022-02-11 DIAGNOSIS — H43.812 PVD (POSTERIOR VITREOUS DETACHMENT), LEFT EYE: Primary | ICD-10-CM

## 2022-02-11 DIAGNOSIS — H35.363 FAMILIAL DRUSEN OF BOTH EYES: ICD-10-CM

## 2022-02-11 PROCEDURE — 1159F MED LIST DOCD IN RCRD: CPT | Mod: HCNC,CPTII,S$GLB, | Performed by: OPHTHALMOLOGY

## 2022-02-11 PROCEDURE — 1160F RVW MEDS BY RX/DR IN RCRD: CPT | Mod: HCNC,CPTII,S$GLB, | Performed by: OPHTHALMOLOGY

## 2022-02-11 PROCEDURE — 92134 CPTRZ OPH DX IMG PST SGM RTA: CPT | Mod: HCNC,S$GLB,, | Performed by: OPHTHALMOLOGY

## 2022-02-11 PROCEDURE — 99213 PR OFFICE/OUTPT VISIT, EST, LEVL III, 20-29 MIN: ICD-10-PCS | Mod: HCNC,S$GLB,, | Performed by: OPHTHALMOLOGY

## 2022-02-11 PROCEDURE — 1159F PR MEDICATION LIST DOCUMENTED IN MEDICAL RECORD: ICD-10-PCS | Mod: HCNC,CPTII,S$GLB, | Performed by: OPHTHALMOLOGY

## 2022-02-11 PROCEDURE — 1160F PR REVIEW ALL MEDS BY PRESCRIBER/CLIN PHARMACIST DOCUMENTED: ICD-10-PCS | Mod: HCNC,CPTII,S$GLB, | Performed by: OPHTHALMOLOGY

## 2022-02-11 PROCEDURE — 1126F PR PAIN SEVERITY QUANTIFIED, NO PAIN PRESENT: ICD-10-PCS | Mod: HCNC,CPTII,S$GLB, | Performed by: OPHTHALMOLOGY

## 2022-02-11 PROCEDURE — 99999 PR PBB SHADOW E&M-EST. PATIENT-LVL III: ICD-10-PCS | Mod: PBBFAC,HCNC,, | Performed by: OPHTHALMOLOGY

## 2022-02-11 PROCEDURE — 92134 POSTERIOR SEGMENT OCT RETINA (OCULAR COHERENCE TOMOGRAPHY)-BOTH EYES: ICD-10-PCS | Mod: HCNC,S$GLB,, | Performed by: OPHTHALMOLOGY

## 2022-02-11 PROCEDURE — 1126F AMNT PAIN NOTED NONE PRSNT: CPT | Mod: HCNC,CPTII,S$GLB, | Performed by: OPHTHALMOLOGY

## 2022-02-11 PROCEDURE — 99213 OFFICE O/P EST LOW 20 MIN: CPT | Mod: HCNC,S$GLB,, | Performed by: OPHTHALMOLOGY

## 2022-02-11 PROCEDURE — 99999 PR PBB SHADOW E&M-EST. PATIENT-LVL III: CPT | Mod: PBBFAC,HCNC,, | Performed by: OPHTHALMOLOGY

## 2022-04-27 ENCOUNTER — PATIENT MESSAGE (OUTPATIENT)
Dept: ADMINISTRATIVE | Facility: HOSPITAL | Age: 67
End: 2022-04-27
Payer: MEDICARE

## 2022-04-27 DIAGNOSIS — E78.2 MIXED HYPERLIPIDEMIA: Primary | ICD-10-CM

## 2022-05-04 ENCOUNTER — PATIENT MESSAGE (OUTPATIENT)
Dept: OPHTHALMOLOGY | Facility: CLINIC | Age: 67
End: 2022-05-04
Payer: MEDICARE

## 2022-05-04 ENCOUNTER — TELEPHONE (OUTPATIENT)
Dept: ADMINISTRATIVE | Facility: HOSPITAL | Age: 67
End: 2022-05-04
Payer: MEDICARE

## 2022-05-04 DIAGNOSIS — E78.2 MIXED HYPERLIPIDEMIA: Primary | ICD-10-CM

## 2022-05-04 NOTE — TELEPHONE ENCOUNTER
Pt has scheduled her annual exam for 5/18/22. She would like to get her labs done prior to appt. I have placed orders for CMP and FLP. If you would like any others please place orders and let me know, I will link them to appt.

## 2022-05-04 NOTE — PROGRESS NOTES
"Subjective:      Patient ID: Mouna Chandra is a 67 y.o. female.    Chief Complaint: Annual Exam      HPI  Here for f/u medical problems and preventive exam.  No exercise lately, but is active  Energy ok.  No f/c.  Gets HF during day or night.  Gained back weight that had lost.  BMs normal.  No cp/sob/palp.  Anxiety doing well on paxil/trazoeone.  Taking weekly vit D.  Not taking vit E regularly.  Gets boyfriend's 5yo moving into home tomorrow.    HM: 1/21 fluvax, 3/17 [id number], 6/21 [id number], 11/16 TDaP, 10/16 zostavax, 6/21 Shingrix x2, 1/15 Cscope rep due 10y, 5/22 sched MMG/me, 1/18 Eye Dr. Tobin, 5/21 HCV neg.     Review of Systems   Constitutional: Negative for appetite change, chills, diaphoresis and fever.   HENT: Negative for congestion, ear pain, rhinorrhea, sinus pressure and sore throat.    Respiratory: Negative for cough, chest tightness and shortness of breath.    Cardiovascular: Negative for chest pain and palpitations.   Gastrointestinal: Negative for blood in stool, constipation, diarrhea, nausea and vomiting.   Genitourinary: Negative for dysuria, frequency, hematuria, menstrual problem, urgency and vaginal discharge.   Musculoskeletal: Negative for arthralgias.   Skin: Negative for rash.   Neurological: Negative for dizziness and headaches.   Psychiatric/Behavioral: Negative for sleep disturbance. The patient is not nervous/anxious.          Objective:   /68   Pulse 89   Temp 96.9 °F (36.1 °C)   Ht 5' 1" (1.549 m)   Wt 84.8 kg (186 lb 15.2 oz)   SpO2 98%   BMI 35.32 kg/m²     Physical Exam  Constitutional:       Appearance: She is well-developed.   HENT:      Right Ear: External ear normal. Tympanic membrane is not injected.      Left Ear: External ear normal. Tympanic membrane is not injected.   Eyes:      Conjunctiva/sclera: Conjunctivae normal.   Neck:      Thyroid: No thyromegaly.   Cardiovascular:      Rate and Rhythm: Normal rate and regular rhythm.      Heart " sounds: No murmur heard.    No friction rub. No gallop.   Pulmonary:      Effort: Pulmonary effort is normal.      Breath sounds: Normal breath sounds. No wheezing or rales.   Chest:   Breasts:      Right: No mass, nipple discharge, skin change or tenderness.      Left: No mass, nipple discharge, skin change or tenderness.       Abdominal:      General: Bowel sounds are normal.      Palpations: Abdomen is soft. There is no mass.      Tenderness: There is no abdominal tenderness.   Musculoskeletal:      Cervical back: Normal range of motion and neck supple.   Lymphadenopathy:      Cervical: No cervical adenopathy.   Skin:     General: Skin is warm.      Findings: No rash.   Neurological:      Mental Status: She is alert and oriented to person, place, and time.          Latest Reference Range & Units 05/11/22 08:18   WBC 3.90 - 12.70 K/uL 5.99   RBC 4.00 - 5.40 M/uL 4.28   Hemoglobin 12.0 - 16.0 g/dL 12.7   Hematocrit 37.0 - 48.5 % 40.0   MCV 82 - 98 fL 94   MCH 27.0 - 31.0 pg 29.7   MCHC 32.0 - 36.0 g/dL 31.8 (L)   RDW 11.5 - 14.5 % 12.9   Platelets 150 - 450 K/uL 234   MPV 9.2 - 12.9 fL 9.4   Gran % 38.0 - 73.0 % 56.4   Lymph % 18.0 - 48.0 % 31.2   Mono % 4.0 - 15.0 % 7.0   Eosinophil % 0.0 - 8.0 % 4.2   Basophil % 0.0 - 1.9 % 1.0   Immature Granulocytes 0.0 - 0.5 % 0.2   Gran # (ANC) 1.8 - 7.7 K/uL 3.4   Lymph # 1.0 - 4.8 K/uL 1.9   Mono # 0.3 - 1.0 K/uL 0.4   Eos # 0.0 - 0.5 K/uL 0.3   Baso # 0.00 - 0.20 K/uL 0.06   Immature Grans (Abs) 0.00 - 0.04 K/uL 0.01 [1]   NRBC 0 /100 WBC 0   Differential Method  Automated   Sodium 136 - 145 mmol/L  136 - 145 mmol/L 142  142   Potassium 3.5 - 5.1 mmol/L  3.5 - 5.1 mmol/L 4.5  4.5   Chloride 95 - 110 mmol/L  95 - 110 mmol/L 105  105   CO2 23 - 29 mmol/L  23 - 29 mmol/L 26  26   Anion Gap 8 - 16 mmol/L  8 - 16 mmol/L 11  11   BUN 8 - 23 mg/dL  8 - 23 mg/dL 18  18   Creatinine 0.5 - 1.4 mg/dL  0.5 - 1.4 mg/dL 0.8  0.8   EGFR if non African American >60 mL/min/1.73 m^2  >60  mL/min/1.73 m^2 >60.0 [2]  >60.0 [3]   EGFR if African American >60 mL/min/1.73 m^2  >60 mL/min/1.73 m^2 >60.0  >60.0   Glucose 70 - 110 mg/dL  70 - 110 mg/dL 93  93   Calcium 8.7 - 10.5 mg/dL  8.7 - 10.5 mg/dL 9.3  9.3   Alkaline Phosphatase 55 - 135 U/L  55 - 135 U/L 63  63   PROTEIN TOTAL 6.0 - 8.4 g/dL  6.0 - 8.4 g/dL 6.8  6.8   Albumin 3.5 - 5.2 g/dL  3.5 - 5.2 g/dL 3.9  3.9   BILIRUBIN TOTAL 0.1 - 1.0 mg/dL  0.1 - 1.0 mg/dL 0.5 [4]  0.5 [5]   AST 10 - 40 U/L  10 - 40 U/L 18  18   ALT 10 - 44 U/L  10 - 44 U/L 13  13   Cholesterol 120 - 199 mg/dL 172 [6]   HDL 40 - 75 mg/dL 52 [7]   HDL/Cholesterol Ratio 20.0 - 50.0 % 30.2   LDL Cholesterol External 63.0 - 159.0 mg/dL 106.6 [8]   Non-HDL Cholesterol mg/dL 120 [9]   Total Cholesterol/HDL Ratio 2.0 - 5.0  3.3   Triglycerides 30 - 150 mg/dL 67 [10]   TSH 0.400 - 4.000 uIU/mL 1.209             Assessment:       1. Mixed hyperlipidemia    2. Mixed anxiety and depressive disorder    3. Gastroesophageal reflux disease without esophagitis    4. Vitamin D deficiency    5. Panic attack    6. Encounter for preventive health examination    7. Encounter for screening mammogram for malignant neoplasm of breast    8. Asymptomatic postmenopausal state    9. Acute pain of right knee    10. Lumbar radiculopathy    11. Severe obesity with body mass index (BMI) of 35.0 to 35.9 and comorbidity          Plan:     Mixed hyperlipidemia- cont statin, add exercise.    Mixed anxiety and depressive disorder- doing well, cont meds.    Gastroesophageal reflux disease without esophagitis- cont PPI prn.    Vitamin D deficiency- cont supp.    Panic attack- rare xanax needed.    Encounter for preventive health examination- mmg/bmd.    Severe obesity- start regular exercise.    Encounter for screening mammogram for malignant neoplasm of breast  -     Mammo Digital Screening Bilat w/ Ced; Future; Expected date: 05/18/2022    Asymptomatic postmenopausal state  -     DXA Bone Density Spine And Hip;  Future; Expected date: 05/18/2022    Acute pain of right knee, fell 2mo ago-  -     diclofenac sodium (VOLTAREN) 1 % Gel; Apply 2 g topically once daily.  Dispense: 100 g; Refill: 2    RTC 6mo. Cont nandini for lumbar radiculopathy.

## 2022-05-05 DIAGNOSIS — F51.01 PRIMARY INSOMNIA: ICD-10-CM

## 2022-05-05 RX ORDER — TRAZODONE HYDROCHLORIDE 50 MG/1
TABLET ORAL
Qty: 45 TABLET | Refills: 11 | Status: SHIPPED | OUTPATIENT
Start: 2022-05-05

## 2022-05-05 NOTE — TELEPHONE ENCOUNTER
Care Due:                  Date            Visit Type   Department     Provider  --------------------------------------------------------------------------------                                EP -                              PRIMARY      [facility]    Ofe Leida  Last Visit: 06-      CARE ([facility])   MEDICINE       Rosie HELM                              ANNUAL                              CHECKUP/PHY  [facility]    Ofe Leida  Next Visit: 05-      S            [facility]       Rosie                                                            Last  Test          Frequency    Reason                     Performed    Due Date  --------------------------------------------------------------------------------    CMP.........  12 months..  lovastatin...............  05- 05-    Lipid Panel.  12 months..  lovastatin...............  06- 06-    [employer] Embedded Care Gaps. Reference number: [id number]. 5/05/2022   8:58:43 AM CDT

## 2022-05-05 NOTE — TELEPHONE ENCOUNTER
Refill Routing Note   Medication(s) are not appropriate for processing by [facility] for the following reason(s):      - Indication is outside of scope for [facility]    [facility] action(s):  Route          Medication reconciliation completed: No     Appointments  past 12m or future 3m with PCP    Date Provider   Last Visit   6/8/2021 Ofe Velez MD   Next Visit   5/18/2022 Ofe Velez MD   ED visits in past 90 days: 0        Note composed:12:59 PM 05/05/2022

## 2022-05-11 ENCOUNTER — LAB VISIT (OUTPATIENT)
Dept: LAB | Facility: HOSPITAL | Age: 67
End: 2022-05-11
Attending: INTERNAL MEDICINE
Payer: MEDICARE

## 2022-05-11 DIAGNOSIS — E78.2 MIXED HYPERLIPIDEMIA: ICD-10-CM

## 2022-05-11 LAB
BASOPHILS # BLD AUTO: 0.06 K/UL (ref 0–0.2)
BASOPHILS NFR BLD: 1 % (ref 0–1.9)
DIFFERENTIAL METHOD: ABNORMAL
EOSINOPHIL # BLD AUTO: 0.3 K/UL (ref 0–0.5)
EOSINOPHIL NFR BLD: 4.2 % (ref 0–8)
ERYTHROCYTE [DISTWIDTH] IN BLOOD BY AUTOMATED COUNT: 12.9 % (ref 11.5–14.5)
HCT VFR BLD AUTO: 40 % (ref 37–48.5)
HGB BLD-MCNC: 12.7 G/DL (ref 12–16)
IMM GRANULOCYTES # BLD AUTO: 0.01 K/UL (ref 0–0.04)
IMM GRANULOCYTES NFR BLD AUTO: 0.2 % (ref 0–0.5)
LYMPHOCYTES # BLD AUTO: 1.9 K/UL (ref 1–4.8)
LYMPHOCYTES NFR BLD: 31.2 % (ref 18–48)
MCH RBC QN AUTO: 29.7 PG (ref 27–31)
MCHC RBC AUTO-ENTMCNC: 31.8 G/DL (ref 32–36)
MCV RBC AUTO: 94 FL (ref 82–98)
MONOCYTES # BLD AUTO: 0.4 K/UL (ref 0.3–1)
MONOCYTES NFR BLD: 7 % (ref 4–15)
NEUTROPHILS # BLD AUTO: 3.4 K/UL (ref 1.8–7.7)
NEUTROPHILS NFR BLD: 56.4 % (ref 38–73)
NRBC BLD-RTO: 0 /100 WBC
PLATELET # BLD AUTO: 234 K/UL (ref 150–450)
PMV BLD AUTO: 9.4 FL (ref 9.2–12.9)
RBC # BLD AUTO: 4.28 M/UL (ref 4–5.4)
WBC # BLD AUTO: 5.99 K/UL (ref 3.9–12.7)

## 2022-05-11 PROCEDURE — 80061 LIPID PANEL: CPT | Performed by: INTERNAL MEDICINE

## 2022-05-11 PROCEDURE — 36415 COLL VENOUS BLD VENIPUNCTURE: CPT | Mod: PO | Performed by: INTERNAL MEDICINE

## 2022-05-11 PROCEDURE — 85025 COMPLETE CBC W/AUTO DIFF WBC: CPT | Performed by: INTERNAL MEDICINE

## 2022-05-11 PROCEDURE — 84443 ASSAY THYROID STIM HORMONE: CPT | Performed by: INTERNAL MEDICINE

## 2022-05-11 PROCEDURE — 80053 COMPREHEN METABOLIC PANEL: CPT | Performed by: INTERNAL MEDICINE

## 2022-05-12 LAB
ALBUMIN SERPL BCP-MCNC: 3.9 G/DL (ref 3.5–5.2)
ALBUMIN SERPL BCP-MCNC: 3.9 G/DL (ref 3.5–5.2)
ALP SERPL-CCNC: 63 U/L (ref 55–135)
ALP SERPL-CCNC: 63 U/L (ref 55–135)
ALT SERPL W/O P-5'-P-CCNC: 13 U/L (ref 10–44)
ALT SERPL W/O P-5'-P-CCNC: 13 U/L (ref 10–44)
ANION GAP SERPL CALC-SCNC: 11 MMOL/L (ref 8–16)
ANION GAP SERPL CALC-SCNC: 11 MMOL/L (ref 8–16)
AST SERPL-CCNC: 18 U/L (ref 10–40)
AST SERPL-CCNC: 18 U/L (ref 10–40)
BILIRUB SERPL-MCNC: 0.5 MG/DL (ref 0.1–1)
BILIRUB SERPL-MCNC: 0.5 MG/DL (ref 0.1–1)
BUN SERPL-MCNC: 18 MG/DL (ref 8–23)
BUN SERPL-MCNC: 18 MG/DL (ref 8–23)
CALCIUM SERPL-MCNC: 9.3 MG/DL (ref 8.7–10.5)
CALCIUM SERPL-MCNC: 9.3 MG/DL (ref 8.7–10.5)
CHLORIDE SERPL-SCNC: 105 MMOL/L (ref 95–110)
CHLORIDE SERPL-SCNC: 105 MMOL/L (ref 95–110)
CHOLEST SERPL-MCNC: 172 MG/DL (ref 120–199)
CHOLEST/HDLC SERPL: 3.3 {RATIO} (ref 2–5)
CO2 SERPL-SCNC: 26 MMOL/L (ref 23–29)
CO2 SERPL-SCNC: 26 MMOL/L (ref 23–29)
CREAT SERPL-MCNC: 0.8 MG/DL (ref 0.5–1.4)
CREAT SERPL-MCNC: 0.8 MG/DL (ref 0.5–1.4)
EST. GFR  (AFRICAN AMERICAN): >60 ML/MIN/1.73 M^2
EST. GFR  (AFRICAN AMERICAN): >60 ML/MIN/1.73 M^2
EST. GFR  (NON AFRICAN AMERICAN): >60 ML/MIN/1.73 M^2
EST. GFR  (NON AFRICAN AMERICAN): >60 ML/MIN/1.73 M^2
GLUCOSE SERPL-MCNC: 93 MG/DL (ref 70–110)
GLUCOSE SERPL-MCNC: 93 MG/DL (ref 70–110)
HDLC SERPL-MCNC: 52 MG/DL (ref 40–75)
HDLC SERPL: 30.2 % (ref 20–50)
LDLC SERPL CALC-MCNC: 106.6 MG/DL (ref 63–159)
NONHDLC SERPL-MCNC: 120 MG/DL
POTASSIUM SERPL-SCNC: 4.5 MMOL/L (ref 3.5–5.1)
POTASSIUM SERPL-SCNC: 4.5 MMOL/L (ref 3.5–5.1)
PROT SERPL-MCNC: 6.8 G/DL (ref 6–8.4)
PROT SERPL-MCNC: 6.8 G/DL (ref 6–8.4)
SODIUM SERPL-SCNC: 142 MMOL/L (ref 136–145)
SODIUM SERPL-SCNC: 142 MMOL/L (ref 136–145)
TRIGL SERPL-MCNC: 67 MG/DL (ref 30–150)
TSH SERPL DL<=0.005 MIU/L-ACNC: 1.21 UIU/ML (ref 0.4–4)

## 2022-05-18 ENCOUNTER — OFFICE VISIT (OUTPATIENT)
Dept: FAMILY MEDICINE | Facility: CLINIC | Age: 67
End: 2022-05-18
Payer: MEDICARE

## 2022-05-18 VITALS
SYSTOLIC BLOOD PRESSURE: 118 MMHG | BODY MASS INDEX: 35.29 KG/M2 | OXYGEN SATURATION: 98 % | HEART RATE: 89 BPM | DIASTOLIC BLOOD PRESSURE: 68 MMHG | TEMPERATURE: 97 F | WEIGHT: 186.94 LBS | HEIGHT: 61 IN

## 2022-05-18 DIAGNOSIS — F41.0 PANIC ATTACK: ICD-10-CM

## 2022-05-18 DIAGNOSIS — E66.01 SEVERE OBESITY WITH BODY MASS INDEX (BMI) OF 35.0 TO 35.9 AND COMORBIDITY: ICD-10-CM

## 2022-05-18 DIAGNOSIS — M25.561 ACUTE PAIN OF RIGHT KNEE: ICD-10-CM

## 2022-05-18 DIAGNOSIS — E78.2 MIXED HYPERLIPIDEMIA: Primary | Chronic | ICD-10-CM

## 2022-05-18 DIAGNOSIS — Z12.31 ENCOUNTER FOR SCREENING MAMMOGRAM FOR MALIGNANT NEOPLASM OF BREAST: ICD-10-CM

## 2022-05-18 DIAGNOSIS — M54.16 LUMBAR RADICULOPATHY: ICD-10-CM

## 2022-05-18 DIAGNOSIS — Z00.00 ENCOUNTER FOR PREVENTIVE HEALTH EXAMINATION: ICD-10-CM

## 2022-05-18 DIAGNOSIS — Z78.0 ASYMPTOMATIC POSTMENOPAUSAL STATE: ICD-10-CM

## 2022-05-18 DIAGNOSIS — K21.9 GASTROESOPHAGEAL REFLUX DISEASE WITHOUT ESOPHAGITIS: ICD-10-CM

## 2022-05-18 DIAGNOSIS — E55.9 VITAMIN D DEFICIENCY: ICD-10-CM

## 2022-05-18 DIAGNOSIS — F41.8 MIXED ANXIETY AND DEPRESSIVE DISORDER: ICD-10-CM

## 2022-05-18 PROCEDURE — 99999 PR PBB SHADOW E&M-EST. PATIENT-LVL IV: ICD-10-PCS | Mod: PBBFAC,,, | Performed by: INTERNAL MEDICINE

## 2022-05-18 PROCEDURE — 1125F AMNT PAIN NOTED PAIN PRSNT: CPT | Mod: CPTII,S$GLB,, | Performed by: INTERNAL MEDICINE

## 2022-05-18 PROCEDURE — 3078F DIAST BP <80 MM HG: CPT | Mod: CPTII,S$GLB,, | Performed by: INTERNAL MEDICINE

## 2022-05-18 PROCEDURE — 99214 OFFICE O/P EST MOD 30 MIN: CPT | Mod: S$GLB,,, | Performed by: INTERNAL MEDICINE

## 2022-05-18 PROCEDURE — 1159F MED LIST DOCD IN RCRD: CPT | Mod: CPTII,S$GLB,, | Performed by: INTERNAL MEDICINE

## 2022-05-18 PROCEDURE — 99214 PR OFFICE/OUTPT VISIT, EST, LEVL IV, 30-39 MIN: ICD-10-PCS | Mod: S$GLB,,, | Performed by: INTERNAL MEDICINE

## 2022-05-18 PROCEDURE — 3288F FALL RISK ASSESSMENT DOCD: CPT | Mod: CPTII,S$GLB,, | Performed by: INTERNAL MEDICINE

## 2022-05-18 PROCEDURE — 1101F PR PT FALLS ASSESS DOC 0-1 FALLS W/OUT INJ PAST YR: ICD-10-PCS | Mod: CPTII,S$GLB,, | Performed by: INTERNAL MEDICINE

## 2022-05-18 PROCEDURE — 1125F PR PAIN SEVERITY QUANTIFIED, PAIN PRESENT: ICD-10-PCS | Mod: CPTII,S$GLB,, | Performed by: INTERNAL MEDICINE

## 2022-05-18 PROCEDURE — 1159F PR MEDICATION LIST DOCUMENTED IN MEDICAL RECORD: ICD-10-PCS | Mod: CPTII,S$GLB,, | Performed by: INTERNAL MEDICINE

## 2022-05-18 PROCEDURE — 99499 RISK ADDL DX/OHS AUDIT: ICD-10-PCS | Mod: S$GLB,,, | Performed by: INTERNAL MEDICINE

## 2022-05-18 PROCEDURE — 99499 UNLISTED E&M SERVICE: CPT | Mod: S$GLB,,, | Performed by: INTERNAL MEDICINE

## 2022-05-18 PROCEDURE — 3074F SYST BP LT 130 MM HG: CPT | Mod: CPTII,S$GLB,, | Performed by: INTERNAL MEDICINE

## 2022-05-18 PROCEDURE — 3008F PR BODY MASS INDEX (BMI) DOCUMENTED: ICD-10-PCS | Mod: CPTII,S$GLB,, | Performed by: INTERNAL MEDICINE

## 2022-05-18 PROCEDURE — 1101F PT FALLS ASSESS-DOCD LE1/YR: CPT | Mod: CPTII,S$GLB,, | Performed by: INTERNAL MEDICINE

## 2022-05-18 PROCEDURE — 3074F PR MOST RECENT SYSTOLIC BLOOD PRESSURE < 130 MM HG: ICD-10-PCS | Mod: CPTII,S$GLB,, | Performed by: INTERNAL MEDICINE

## 2022-05-18 PROCEDURE — 99999 PR PBB SHADOW E&M-EST. PATIENT-LVL IV: CPT | Mod: PBBFAC,,, | Performed by: INTERNAL MEDICINE

## 2022-05-18 PROCEDURE — 3078F PR MOST RECENT DIASTOLIC BLOOD PRESSURE < 80 MM HG: ICD-10-PCS | Mod: CPTII,S$GLB,, | Performed by: INTERNAL MEDICINE

## 2022-05-18 PROCEDURE — 3008F BODY MASS INDEX DOCD: CPT | Mod: CPTII,S$GLB,, | Performed by: INTERNAL MEDICINE

## 2022-05-18 PROCEDURE — 3288F PR FALLS RISK ASSESSMENT DOCUMENTED: ICD-10-PCS | Mod: CPTII,S$GLB,, | Performed by: INTERNAL MEDICINE

## 2022-05-18 RX ORDER — DICLOFENAC SODIUM 10 MG/G
2 GEL TOPICAL DAILY
Qty: 100 G | Refills: 2 | Status: SHIPPED | OUTPATIENT
Start: 2022-05-18 | End: 2022-11-17

## 2022-05-19 DIAGNOSIS — E55.9 VITAMIN D DEFICIENCY: ICD-10-CM

## 2022-05-19 RX ORDER — ACETAMINOPHEN 500 MG
2000 TABLET ORAL DAILY
Qty: 100 CAPSULE | Refills: 6 | Status: SHIPPED | OUTPATIENT
Start: 2022-05-19

## 2022-06-13 ENCOUNTER — APPOINTMENT (OUTPATIENT)
Dept: RADIOLOGY | Facility: HOSPITAL | Age: 67
End: 2022-06-13
Attending: INTERNAL MEDICINE
Payer: MEDICARE

## 2022-06-13 DIAGNOSIS — Z78.0 ASYMPTOMATIC POSTMENOPAUSAL STATE: ICD-10-CM

## 2022-06-13 PROCEDURE — 77080 DXA BONE DENSITY AXIAL: CPT | Mod: TC

## 2022-06-13 PROCEDURE — 77080 DEXA BONE DENSITY SPINE HIP: ICD-10-PCS | Mod: 26,,, | Performed by: RADIOLOGY

## 2022-06-13 PROCEDURE — 77080 DXA BONE DENSITY AXIAL: CPT | Mod: 26,,, | Performed by: RADIOLOGY

## 2022-06-15 ENCOUNTER — PATIENT MESSAGE (OUTPATIENT)
Dept: FAMILY MEDICINE | Facility: CLINIC | Age: 67
End: 2022-06-15
Payer: MEDICARE

## 2022-06-15 ENCOUNTER — TELEPHONE (OUTPATIENT)
Dept: FAMILY MEDICINE | Facility: CLINIC | Age: 67
End: 2022-06-15
Payer: MEDICARE

## 2022-06-15 DIAGNOSIS — M85.80 OSTEOPENIA WITH HIGH RISK OF FRACTURE: Primary | ICD-10-CM

## 2022-07-14 ENCOUNTER — HOSPITAL ENCOUNTER (OUTPATIENT)
Dept: RADIOLOGY | Facility: HOSPITAL | Age: 67
Discharge: HOME | End: 2022-07-14
Attending: INTERNAL MEDICINE
Payer: MEDICARE

## 2022-07-14 DIAGNOSIS — Z12.31 ENCOUNTER FOR SCREENING MAMMOGRAM FOR MALIGNANT NEOPLASM OF BREAST: ICD-10-CM

## 2022-07-14 PROCEDURE — 77067 SCR MAMMO BI INCL CAD: CPT | Mod: TC

## 2022-07-14 PROCEDURE — 77063 MAMMO DIGITAL SCREENING BILAT WITH TOMO: ICD-10-PCS | Mod: 26,,, | Performed by: RADIOLOGY

## 2022-07-14 PROCEDURE — 77063 BREAST TOMOSYNTHESIS BI: CPT | Mod: TC

## 2022-07-14 PROCEDURE — 77067 SCR MAMMO BI INCL CAD: CPT | Mod: 26,,, | Performed by: RADIOLOGY

## 2022-07-14 PROCEDURE — 77063 BREAST TOMOSYNTHESIS BI: CPT | Mod: 26,,, | Performed by: RADIOLOGY

## 2022-07-14 PROCEDURE — 77067 MAMMO DIGITAL SCREENING BILAT WITH TOMO: ICD-10-PCS | Mod: 26,,, | Performed by: RADIOLOGY

## 2022-07-17 DIAGNOSIS — E78.2 MIXED HYPERLIPIDEMIA: Chronic | ICD-10-CM

## 2022-07-17 NOTE — TELEPHONE ENCOUNTER
No new care gaps identified.  [facility] Embedded Care Gaps. Reference number: [id number]. 7/17/2022   6:41:47 AM ERINT

## 2022-07-18 RX ORDER — LOVASTATIN 40 MG/1
TABLET ORAL
Qty: 90 TABLET | Refills: 3 | Status: SHIPPED | OUTPATIENT
Start: 2022-07-18 | End: 2023-07-27

## 2022-07-18 NOTE — TELEPHONE ENCOUNTER
Refill Decision Note   Mouna Prateek  is requesting a refill authorization.  Brief Assessment and Rationale for Refill:  Approve     Medication Therapy Plan:       Medication Reconciliation Completed: No   Comments:     No Care Gaps recommended.     Note composed:2:10 PM 07/18/2022

## 2022-08-12 ENCOUNTER — TELEPHONE (OUTPATIENT)
Dept: ADMINISTRATIVE | Facility: HOSPITAL | Age: 67
End: 2022-08-12
Payer: MEDICARE

## 2022-08-23 ENCOUNTER — OFFICE VISIT (OUTPATIENT)
Dept: FAMILY MEDICINE | Facility: CLINIC | Age: 67
End: 2022-08-23
Payer: MEDICARE

## 2022-08-23 VITALS
HEART RATE: 70 BPM | HEIGHT: 61 IN | TEMPERATURE: 98 F | WEIGHT: 187.38 LBS | RESPIRATION RATE: 20 BRPM | OXYGEN SATURATION: 96 % | BODY MASS INDEX: 35.38 KG/M2 | DIASTOLIC BLOOD PRESSURE: 72 MMHG | SYSTOLIC BLOOD PRESSURE: 132 MMHG

## 2022-08-23 DIAGNOSIS — Z00.00 ENCOUNTER FOR PREVENTIVE HEALTH EXAMINATION: Primary | ICD-10-CM

## 2022-08-23 DIAGNOSIS — E55.9 VITAMIN D DEFICIENCY: ICD-10-CM

## 2022-08-23 DIAGNOSIS — H35.363 FAMILIAL DRUSEN OF BOTH EYES: ICD-10-CM

## 2022-08-23 DIAGNOSIS — E78.2 MIXED HYPERLIPIDEMIA: Chronic | ICD-10-CM

## 2022-08-23 DIAGNOSIS — I77.1 TORTUOUS AORTA: ICD-10-CM

## 2022-08-23 DIAGNOSIS — E66.01 SEVERE OBESITY WITH BODY MASS INDEX (BMI) OF 35.0 TO 35.9 AND COMORBIDITY: ICD-10-CM

## 2022-08-23 DIAGNOSIS — M54.16 LUMBAR RADICULOPATHY: ICD-10-CM

## 2022-08-23 DIAGNOSIS — H35.3133 ADVANCED ATROPHIC NONEXUDATIVE AGE-RELATED MACULAR DEGENERATION OF BOTH EYES WITHOUT SUBFOVEAL INVOLVEMENT: ICD-10-CM

## 2022-08-23 DIAGNOSIS — M81.0 AGE-RELATED OSTEOPOROSIS WITHOUT CURRENT PATHOLOGICAL FRACTURE: ICD-10-CM

## 2022-08-23 DIAGNOSIS — F41.8 MIXED ANXIETY AND DEPRESSIVE DISORDER: ICD-10-CM

## 2022-08-23 DIAGNOSIS — K44.0 DIAPHRAGMATIC HERNIA WITH OBSTRUCTION, WITHOUT GANGRENE: ICD-10-CM

## 2022-08-23 DIAGNOSIS — K21.9 GASTROESOPHAGEAL REFLUX DISEASE WITHOUT ESOPHAGITIS: ICD-10-CM

## 2022-08-23 PROCEDURE — 1170F FXNL STATUS ASSESSED: CPT | Mod: CPTII,S$GLB,, | Performed by: NURSE PRACTITIONER

## 2022-08-23 PROCEDURE — 1160F PR REVIEW ALL MEDS BY PRESCRIBER/CLIN PHARMACIST DOCUMENTED: ICD-10-PCS | Mod: CPTII,S$GLB,, | Performed by: NURSE PRACTITIONER

## 2022-08-23 PROCEDURE — G0439 PPPS, SUBSEQ VISIT: HCPCS | Mod: S$GLB,,, | Performed by: NURSE PRACTITIONER

## 2022-08-23 PROCEDURE — 1170F PR FUNCTIONAL STATUS ASSESSED: ICD-10-PCS | Mod: CPTII,S$GLB,, | Performed by: NURSE PRACTITIONER

## 2022-08-23 PROCEDURE — 1126F AMNT PAIN NOTED NONE PRSNT: CPT | Mod: CPTII,S$GLB,, | Performed by: NURSE PRACTITIONER

## 2022-08-23 PROCEDURE — 1101F PT FALLS ASSESS-DOCD LE1/YR: CPT | Mod: CPTII,S$GLB,, | Performed by: NURSE PRACTITIONER

## 2022-08-23 PROCEDURE — 1159F MED LIST DOCD IN RCRD: CPT | Mod: CPTII,S$GLB,, | Performed by: NURSE PRACTITIONER

## 2022-08-23 PROCEDURE — 99499 RISK ADDL DX/OHS AUDIT: ICD-10-PCS | Mod: S$GLB,,, | Performed by: NURSE PRACTITIONER

## 2022-08-23 PROCEDURE — 3008F BODY MASS INDEX DOCD: CPT | Mod: CPTII,S$GLB,, | Performed by: NURSE PRACTITIONER

## 2022-08-23 PROCEDURE — 99999 PR PBB SHADOW E&M-EST. PATIENT-LVL V: ICD-10-PCS | Mod: PBBFAC,,, | Performed by: NURSE PRACTITIONER

## 2022-08-23 PROCEDURE — 3078F PR MOST RECENT DIASTOLIC BLOOD PRESSURE < 80 MM HG: ICD-10-PCS | Mod: CPTII,S$GLB,, | Performed by: NURSE PRACTITIONER

## 2022-08-23 PROCEDURE — 99499 UNLISTED E&M SERVICE: CPT | Mod: S$GLB,,, | Performed by: NURSE PRACTITIONER

## 2022-08-23 PROCEDURE — 99999 PR PBB SHADOW E&M-EST. PATIENT-LVL V: CPT | Mod: PBBFAC,,, | Performed by: NURSE PRACTITIONER

## 2022-08-23 PROCEDURE — 1126F PR PAIN SEVERITY QUANTIFIED, NO PAIN PRESENT: ICD-10-PCS | Mod: CPTII,S$GLB,, | Performed by: NURSE PRACTITIONER

## 2022-08-23 PROCEDURE — G0439 PR MEDICARE ANNUAL WELLNESS SUBSEQUENT VISIT: ICD-10-PCS | Mod: S$GLB,,, | Performed by: NURSE PRACTITIONER

## 2022-08-23 PROCEDURE — 3078F DIAST BP <80 MM HG: CPT | Mod: CPTII,S$GLB,, | Performed by: NURSE PRACTITIONER

## 2022-08-23 PROCEDURE — 3288F PR FALLS RISK ASSESSMENT DOCUMENTED: ICD-10-PCS | Mod: CPTII,S$GLB,, | Performed by: NURSE PRACTITIONER

## 2022-08-23 PROCEDURE — 1159F PR MEDICATION LIST DOCUMENTED IN MEDICAL RECORD: ICD-10-PCS | Mod: CPTII,S$GLB,, | Performed by: NURSE PRACTITIONER

## 2022-08-23 PROCEDURE — 3288F FALL RISK ASSESSMENT DOCD: CPT | Mod: CPTII,S$GLB,, | Performed by: NURSE PRACTITIONER

## 2022-08-23 PROCEDURE — 3075F SYST BP GE 130 - 139MM HG: CPT | Mod: CPTII,S$GLB,, | Performed by: NURSE PRACTITIONER

## 2022-08-23 PROCEDURE — 3008F PR BODY MASS INDEX (BMI) DOCUMENTED: ICD-10-PCS | Mod: CPTII,S$GLB,, | Performed by: NURSE PRACTITIONER

## 2022-08-23 PROCEDURE — 1101F PR PT FALLS ASSESS DOC 0-1 FALLS W/OUT INJ PAST YR: ICD-10-PCS | Mod: CPTII,S$GLB,, | Performed by: NURSE PRACTITIONER

## 2022-08-23 PROCEDURE — 1160F RVW MEDS BY RX/DR IN RCRD: CPT | Mod: CPTII,S$GLB,, | Performed by: NURSE PRACTITIONER

## 2022-08-23 PROCEDURE — 3075F PR MOST RECENT SYSTOLIC BLOOD PRESS GE 130-139MM HG: ICD-10-PCS | Mod: CPTII,S$GLB,, | Performed by: NURSE PRACTITIONER

## 2022-08-23 NOTE — PATIENT INSTRUCTIONS
Counseling and Referral of Other Preventative  (Italic type indicates deductible and co-insurance are waived)    Patient Name: Mouna Chandra  Today's Date: 8/23/2022    Health Maintenance       Date Due Completion Date    COVID-19 Vaccine (3 - Booster for Pfizer series) 01/20/2022 8/20/2021    Influenza Vaccine (1) 09/01/2022 1/12/2021    Lipid Panel 05/11/2023 5/11/2022    Mammogram 07/14/2023 7/14/2022    Override on 8/12/2013: Done    DEXA Scan 06/13/2024 6/13/2022    Override on 8/12/2013: Done (NORMAL with Dr. Finch)    Colorectal Cancer Screening 01/15/2025 1/15/2015    Override on 8/3/2006: Done (INternal Hemorrhoids o/w Normal with Dr. Munoz)    TETANUS VACCINE 11/21/2026 11/21/2016        No orders of the defined types were placed in this encounter.    The following information is provided to all patients.  This information is to help you find resources for any of the problems found today that may be affecting your health:                Living healthy guide: www.[facility].[place].[facility]      Understanding Diabetes: www.diabetes.org      Eating healthy: www.cdc.gov/healthyweight      CDC home safety checklist: www.cdc.gov/steadi/patient.html      Agency on Aging: www.goea.[place]      Alcoholics anonymous (AA): www.aa.org      Physical Activity: www.diaz.nih.gov/rs2fyhy      Tobacco use: www.quitwithusla.org     Counseling and Referral of Other Preventative  (Italic type indicates deductible and co-insurance are waived)    Patient Name: Mouna Chandra  Today's Date: 8/23/2022    Health Maintenance       Date Due Completion Date    COVID-19 Vaccine (3 - Booster for Pfizer series) 01/20/2022 8/20/2021    Influenza Vaccine (1) 09/01/2022 1/12/2021    Lipid Panel 05/11/2023 5/11/2022    Mammogram 07/14/2023 7/14/2022    Override on 8/12/2013: Done    DEXA Scan 06/13/2024 6/13/2022    Override on 8/12/2013: Done (NORMAL with Dr. Finch)    Colorectal Cancer Screening 01/15/2025 1/15/2015     Override on 8/3/2006: Done (INternal Hemorrhoids o/w Normal with Dr. Munoz)    TETANUS VACCINE 11/21/2026 11/21/2016        No orders of the defined types were placed in this encounter.    The following information is provided to all patients.  This information is to help you find resources for any of the problems found today that may be affecting your health:                Living healthy guide: www.[facility].[place].[facility]      Understanding Diabetes: www.diabetes.org      Eating healthy: www.cdc.gov/healthyweight      [facility] home safety checklist: www.cdc.gov/steadi/patient.html      Agency on Aging: www.goea.[place]      Alcoholics anonymous (AA): www.aa.org      Physical Activity: www.diaz.nih.gov/vg8btgn      Tobacco use: www.quitwithusla.org

## 2022-08-23 NOTE — PROGRESS NOTES
"  Mouna Chandra presented for a  Medicare AWV and comprehensive Health Risk Assessment today. The following components were reviewed and updated:    · Medical history  · Family History  · Social history  · Allergies and Current Medications  · Health Risk Assessment  · Health Maintenance  · Care Team         ** See Completed Assessments for Annual Wellness Visit within the encounter summary.**         The following assessments were completed:  · Living Situation  · CAGE  · Depression Screening  · Timed Get Up and Go  · Whisper Test  · Cognitive Function Screening  · Nutrition Screening  · ADL Screening  · PAQ Screening        Vitals:    08/23/22 1513   BP: 132/72   Pulse: 70   Resp: 20   Temp: 98.1 °F (36.7 °C)   SpO2: 96%   Weight: 85 kg (187 lb 6.3 oz)   Height: 5' 1" (1.549 m)     Body mass index is 35.41 kg/m².  Physical Exam  Vitals and nursing note reviewed.   Constitutional:       Appearance: Normal appearance. She is well-developed.   HENT:      Head: Normocephalic and atraumatic.   Eyes:      Pupils: Pupils are equal, round, and reactive to light.   Neck:      Vascular: No carotid bruit.   Cardiovascular:      Rate and Rhythm: Normal rate and regular rhythm.      Pulses: Normal pulses.      Heart sounds: Normal heart sounds. No murmur heard.    No gallop.   Pulmonary:      Effort: Pulmonary effort is normal.      Breath sounds: Normal breath sounds.   Abdominal:      General: Bowel sounds are normal. There is no distension.      Palpations: Abdomen is soft.      Tenderness: There is no abdominal tenderness.   Musculoskeletal:         General: No tenderness. Normal range of motion.   Skin:     General: Skin is warm and dry.   Neurological:      Mental Status: She is alert.      Motor: No abnormal muscle tone.   Psychiatric:         Speech: Speech normal.         Behavior: Behavior normal.         Thought Content: Thought content normal.         Judgment: Judgment normal.             Current Outpatient " Medications:     cholecalciferol, vitamin D3, (VITAMIN D3) 50 mcg (2,000 unit) Cap capsule, Take 1 capsule (2,000 Units total) by mouth once daily., Disp: 100 capsule, Rfl: 6    diclofenac sodium (VOLTAREN) 1 % Gel, Apply 2 g topically once daily., Disp: 100 g, Rfl: 2    dicyclomine (BENTYL) 10 MG capsule, TAKE ONE CAPSULE BY MOUTH BEFORE MEALS AND AT BEDTIME AS NEEDED FOR ABDOMINAL SPASMS, Disp: , Rfl:     gabapentin (NEURONTIN) 300 MG capsule, TAKE ONE CAPSULE AT BEDTIME FOR 1 WEEK, THEN 2 AT BEDTIME FOR 1 WEEK, 3 CAPSULES AT BEDTIME FOR 1 WEEK, THEN 1 CAPSULE BY MOUTH THREE TIMES DAILY, Disp: 90 capsule, Rfl: 0    ibuprofen (ADVIL,MOTRIN) 800 MG tablet, Take 800 mg by mouth every 6 (six) hours as needed for Pain., Disp: , Rfl:     lovastatin (MEVACOR) 40 MG tablet, TAKE 1 TABLET(40 MG) BY MOUTH EVERY EVENING, Disp: 90 tablet, Rfl: 3    magnesium 250 mg Tab, Take 250 mg by mouth once., Disp: , Rfl:     multivit,iron,minerals/lutein (CENTRUM SILVER ULTRA WOMEN'S ORAL), Take by mouth once daily., Disp: , Rfl:     paroxetine (PAXIL) 40 MG tablet, TAKE 1 TABLET(40 MG) BY MOUTH EVERY MORNING, Disp: 90 tablet, Rfl: 0    trazodone (DESYREL) 50 MG tablet, TAKE 1 AND 1/2 TABLETS(75 MG) BY MOUTH EVERY EVENING, Disp: 45 tablet, Rfl: 11    vit A/vit C/vit E/zinc/copper (OCUVITE PRESERVISION ORAL), Take by mouth once daily., Disp: , Rfl:     vitamin E 400 UNIT capsule, Take 400 Units by mouth once daily., Disp: , Rfl:     cholestyramine (QUESTRAN) 4 gram packet, Take 1 packet (4 g total) by mouth daily as needed (diarrhea)., Disp: 90 packet, Rfl: 0    meclizine (ANTIVERT) 25 mg tablet, Take 1 tablet (25 mg total) by mouth 3 (three) times daily as needed for Dizziness or Nausea. (Patient not taking: Reported on 8/23/2022), Disp: 20 tablet, Rfl: 0      Diagnoses and health risks identified today and associated recommendations/orders:    1. Encounter for preventive health examination    2. Tortuous aorta  Chronic  and Stable on Mevacor. Continue current treatment plan as previously prescribed with your PCP    3. Mixed hyperlipidemia  Chronic and Stable on Mevacor Continue current treatment plan as previously prescribed with your PCP    4. Severe obesity with body mass index (BMI) of 35.0 to 35.9 and comorbidity  BMI: 35.41 kg/m²  Idl Wt: 47.8 kg (105 lb 6.1 oz)  Adj Wt: 62.7 kg (138 lb 2.9    Reinforced diet and exercise    5. Gastroesophageal reflux disease without esophagitis  Chronic and Stable on Nexium. Continue current treatment plan as previously prescribed with your PCP    6. 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 Continue current treatment plan as previously prescribed with your PCP    7. Vitamin D deficiency  Chronic and Stable on Vitamin d. Continue current treatment plan as previously prescribed with your PCP    8. Mixed anxiety and depressive disorder  Chronic and Stable Paxil and Trazadone. Continue current treatment plan as previously prescribed with your PCP    9. Lumbar radiculopathy  Chronic and Stable.  S/P injection,accupunture and Gabapentin  Continue current treatment plan as previously prescribed with your chiropractor    10. Age-related osteoporosis without current pathological fracture  Scheduled to see rheumatologist 11/4/ 222 for treatment pan     11. 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 Continue current treatment plan as previously prescribed with your      12. Familial drusen of both eyes  Chronic and Ongoing. Continue current treatment plan as previously prescribed with your       I offered to discuss advanced care planning, including how to pick a person who would make decisions for you if you were unable to make them for yourself, called a health care power of , and what kind of decisions you might make such as use of life  sustaining treatments such as ventilators and tube feeding when faced with a life limiting illness recorded on a living will that they will need to know. (How you want to be cared for as you near the end of your natural life)     X Patient is interested in learning more about how to make advanced directives.  I provided them paperwork and offered to discuss this with them.    Provided Mouna with a 5-10 year written screening schedule and personal prevention plan. Recommendations were developed using the USPHS age appropriate recommendations. Education, counseling, and referrals were provided as needed. After Visit Summary printed and given to patient which includes a list of additional screenings\tests needed.     1 year annual wellness   Veronique Razo NP

## 2022-08-23 NOTE — Clinical Note
Your patient was seen today for a HRA visit. I have included a copy of my visit note, please review the note and feel free to contact me with any questions.  Thank you for allowing me to participate in the care of your patients.  Veronique Razo NP

## 2022-08-24 ENCOUNTER — HOSPITAL ENCOUNTER (EMERGENCY)
Facility: HOSPITAL | Age: 67
Discharge: HOME | End: 2022-08-24
Attending: EMERGENCY MEDICINE
Payer: MEDICARE

## 2022-08-24 VITALS
TEMPERATURE: 98 F | RESPIRATION RATE: 18 BRPM | BODY MASS INDEX: 35.22 KG/M2 | HEART RATE: 74 BPM | DIASTOLIC BLOOD PRESSURE: 73 MMHG | OXYGEN SATURATION: 98 % | SYSTOLIC BLOOD PRESSURE: 148 MMHG | WEIGHT: 186.38 LBS

## 2022-08-24 DIAGNOSIS — M79.645 PAIN OF LEFT THUMB: Primary | ICD-10-CM

## 2022-08-24 PROCEDURE — 29125 APPL SHORT ARM SPLINT STATIC: CPT | Mod: LT

## 2022-08-24 PROCEDURE — 99283 EMERGENCY DEPT VISIT LOW MDM: CPT

## 2022-08-24 NOTE — ED PROVIDER NOTES
HISTORY     Chief Complaint   Patient presents with    Hand Pain     X1 week. Pain mostly to L thumb. Denies injury/trauma     Review of patient's allergies indicates:   Allergen Reactions    Adhesive Rash    Codeine Nausea And Vomiting    Iodine containing multivitamin Nausea Only    Lanolin Hives    Latex, natural rubber Hives    Neosporin [benzalkonium chloride] Hives    Shellfish containing products Nausea And Vomiting    Neosporin (neomycin-polymyx) Hives, Itching and Rash        HPI   67-year-old female presents to the emergency department for left thumb pain x1 week.  Patient denies any specific injury but reports pain mostly around the IP joint and reports pain with movement of her left thumb.  Patient denies any alleviating factors.  Patient denies any fever, chills, chest pain, shortness of breath, back pain, nausea, vomiting, and all other concerns at this time.           PCP: Ofe Duff MD     Past Medical History:  Past Medical History:   Diagnosis Date    Anxiety 12/21/2016    Anxiety and depression     Familial juvenile macular degeneration syndrome - Both Eyes 11/12/2013    Hyperlipidemia LDL goal < 100     Lumbar disc disease     Dr. Chandra    Palpitation 12/21/2016    Panic attack 12/21/2016    Vitamin D deficiency         Past Surgical History:  Past Surgical History:   Procedure Laterality Date    HYSTERECTOMY      INJECTION OF ANESTHETIC AGENT AROUND MEDIAL BRANCH NERVES INNERVATING CERVICAL FACET JOINT Bilateral 4/14/2021    Procedure: Bilateral C3-5 MBB with RN IV sedation;  Surgeon: Steve Gutierrez MD;  Location: [facility]IN [facility]GT;  Service: Pain Management;  Laterality: Bilateral;    SELECTIVE INJECTION OF ANESTHETIC AGENT AROUND LUMBAR SPINAL NERVE ROOT BY TRANSFORAMINAL APPROACH Right 2/25/2021    Procedure: BLOCK, SPINAL NERVE ROOT, LUMBAR, SELECTIVE, TRANSFORAMINAL APPROACH Right L4-5, l5-S1 RN IV sedation;  Surgeon: Steve Gutierrez MD;  Location: [facility]IN [facility];   Service: Pain Management;  Laterality: Right;    TOTAL ABDOMINAL HYSTERECTOMY W/ BILATERAL SALPINGOOPHORECTOMY  2002        Family History:  Family History   Problem Relation Age of Onset    Diabetes Mother     Hypertension Mother     Heart failure Father     Heart attack Father     Macular degeneration Sister     Amblyopia Sister     Cataracts Paternal Uncle     Heart failure Paternal Uncle     Stroke Paternal Uncle     Heart failure Paternal Grandfather     Heart disease Brother 45    Crohn's disease Brother     Heart attack Paternal Grandmother     Blindness Neg Hx     Cancer Neg Hx     Glaucoma Neg Hx     Retinal detachment Neg Hx     Strabismus Neg Hx     Thyroid disease Neg Hx     Colon cancer Neg Hx         Social History:  Social History     Tobacco Use    Smoking status: Passive Smoke Exposure - Never Smoker    Smokeless tobacco: Never Used   Substance and Sexual Activity    Alcohol use: Yes     Alcohol/week: 1.0 standard drink     Types: 1 Standard drinks or equivalent per week     Comment: Socially    Drug use: No    Sexual activity: Yes     Partners: Male         ROS   Review of Systems   Constitutional: Negative for fever.   HENT: Negative for sore throat.    Respiratory: Negative for shortness of breath.    Cardiovascular: Negative for chest pain.   Gastrointestinal: Negative for nausea.   Genitourinary: Negative for dysuria.   Musculoskeletal: Positive for arthralgias. Negative for back pain and joint swelling.   Skin: Negative for rash.   Neurological: Negative for weakness.   Hematological: Does not bruise/bleed easily.       PHYSICAL EXAM     Initial Vitals [08/24/22 1525]   BP Pulse Resp Temp SpO2   (!) 148/73 74 18 98.2 °F (36.8 °C) 98 %      MAP       --           Physical Exam    Nursing note and vitals reviewed.  Constitutional: She appears well-developed and well-nourished. She is not diaphoretic. No distress.   HENT:   Head: Normocephalic and atraumatic.   Eyes:  "Right eye exhibits no discharge. Left eye exhibits no discharge.   Neck: Neck supple.   Normal range of motion.  Cardiovascular: Normal rate.   Pulmonary/Chest: No respiratory distress.   Abdominal: Abdomen is soft. She exhibits no distension.   Musculoskeletal:         General: Normal range of motion.      Cervical back: Normal range of motion and neck supple.      Comments: Left thumb tenderness to palpation.  There is no obvious deformity.  Cap refill less than 2 seconds.     Neurological: She is alert and oriented to person, place, and time. She has normal strength.   Skin: Skin is warm and dry.   Psychiatric: She has a normal mood and affect. Her behavior is normal. Thought content normal.          ED COURSE   Splint Application    Date/Time: 8/24/2022 4:50 PM  Performed by: Jett Hill NP  Authorized by: Josias Freitas Jr., MD   Consent Done: Yes  Consent: Verbal consent obtained. Written consent not obtained.  Risks and benefits: risks, benefits and alternatives were discussed  Consent given by: patient  Patient understanding: patient states understanding of the procedure being performed  Patient consent: the patient's understanding of the procedure matches consent given  Imaging studies: imaging studies available  Patient identity confirmed: name  Time out: Immediately prior to procedure a "time out" was called to verify the correct patient, procedure, equipment, support staff and site/side marked as required.  Location: Left hand.  Splint type: Thumb spica.  Supplies used: Velcro thumb spica splint.  Post-procedure: The splinted body part was neurovascularly unchanged following the procedure.  Patient tolerance: Patient tolerated the procedure well with no immediate complications        ED ONGOING VITALS:  Vitals:    08/24/22 1523 08/24/22 1525   BP:  (!) 148/73   Pulse:  74   Resp:  18   Temp:  98.2 °F (36.8 °C)   TempSrc:  Oral   SpO2:  98%   Weight: 84.5 kg (186 lb 6.4 oz)          ABNORMAL LAB " VALUES:  Labs Reviewed - No data to display      ALL LAB VALUES:  none      RADIOLOGY STUDIES:  Imaging Results          X-Ray Hand 3 view Left (Final result)  Result time 08/24/22 16:23:04    Final result by Kranthi Holliday MD (08/24/22 16:23:04)                 Impression:      Exam relatively stable from the prior of 01/04/2017, as above.  No acute abnormality identified.      Electronically signed by: Kranthi Holliday  Date:    08/24/2022  Time:    16:23             Narrative:    EXAMINATION:  XR HAND COMPLETE 3 VIEW LEFT    CLINICAL HISTORY:  left thumb pain;.    TECHNIQUE:  PA, lateral, and oblique views of the left hand were performed.    COMPARISON:  None    FINDINGS:  No fracture.  No dislocation.  No osseous destructive process.  Mild degenerative change of the left thumb base.  This is relatively similar to the prior of 01/20/2017.                                          The above vital signs and test results have been reviewed by the emergency provider.     ED Medications:  Current Discharge Medication List        Discharge Medications:  New Prescriptions    No medications on file       Follow-up Information     Kurt Moreno MD.    Specialties: Hand Surgery, Orthopedic Surgery  Why: As needed  Contact information:  [address] [place] [address] Dr Quirino Barba [address] [place] [id number]  [phone]                        I discussed with patient and/or family/caretaker that evaluation in the ED does not suggest any emergent or life threatening medical conditions requiring immediate intervention beyond what was provided in the ED, and I believe patient is safe for discharge. Regardless, an unremarkable evaluation in the ED does not preclude the development or presence of a serious or life threatening condition. As such, patient was instructed to return immediately for any worsening or change in current symptoms.        MEDICAL DECISION MAKING                 CLINICAL IMPRESSION       ICD-10-CM ICD-9-CM   1.  Pain of left thumb  M79.645 729.5       Disposition:   Disposition: Discharged  Condition: Stable         Jett Hill NP  08/26/22 1229

## 2022-08-24 NOTE — FIRST PROVIDER EVALUATION
Medical screening exam completed.  I have conducted a focused provider triage encounter, findings are as follows:    Brief history of present illness:  Reports left thumb/hand pain.     Vitals:    08/24/22 1523 08/24/22 1525   BP:  (!) 148/73   BP Location:  Right arm   Patient Position:  Sitting   Pulse:  74   Resp:  18   Temp:  98.2 °F (36.8 °C)   TempSrc:  Oral   SpO2:  98%   Weight: 84.5 kg (186 lb 6.4 oz)        Pertinent physical exam:  nad    Brief workup plan:  Imaging     Preliminary workup initiated; this workup will be continued and followed by the physician or advanced practice provider that is assigned to the patient when roomed.

## 2022-10-04 ENCOUNTER — LAB VISIT (OUTPATIENT)
Dept: LAB | Facility: HOSPITAL | Age: 67
End: 2022-10-04
Attending: PHYSICIAN ASSISTANT
Payer: MEDICARE

## 2022-10-04 ENCOUNTER — OFFICE VISIT (OUTPATIENT)
Dept: RHEUMATOLOGY | Facility: CLINIC | Age: 67
End: 2022-10-04
Payer: MEDICARE

## 2022-10-04 VITALS
HEART RATE: 81 BPM | SYSTOLIC BLOOD PRESSURE: 127 MMHG | BODY MASS INDEX: 35.09 KG/M2 | HEIGHT: 61 IN | DIASTOLIC BLOOD PRESSURE: 75 MMHG | WEIGHT: 185.88 LBS

## 2022-10-04 DIAGNOSIS — M85.80 OSTEOPENIA WITH HIGH RISK OF FRACTURE: Primary | ICD-10-CM

## 2022-10-04 DIAGNOSIS — M65.312 TRIGGER FINGER OF LEFT THUMB: ICD-10-CM

## 2022-10-04 DIAGNOSIS — M85.80 OSTEOPENIA WITH HIGH RISK OF FRACTURE: ICD-10-CM

## 2022-10-04 DIAGNOSIS — Z51.81 MEDICATION MONITORING ENCOUNTER: ICD-10-CM

## 2022-10-04 LAB
25(OH)D3+25(OH)D2 SERPL-MCNC: 48 NG/ML (ref 30–96)
ALBUMIN SERPL BCP-MCNC: 4.2 G/DL (ref 3.5–5.2)
ALP SERPL-CCNC: 65 U/L (ref 55–135)
ALT SERPL W/O P-5'-P-CCNC: 18 U/L (ref 10–44)
ANION GAP SERPL CALC-SCNC: 10 MMOL/L (ref 8–16)
AST SERPL-CCNC: 19 U/L (ref 10–40)
BILIRUB SERPL-MCNC: 0.8 MG/DL (ref 0.1–1)
BUN SERPL-MCNC: 18 MG/DL (ref 8–23)
CALCIUM SERPL-MCNC: 9.7 MG/DL (ref 8.7–10.5)
CHLORIDE SERPL-SCNC: 105 MMOL/L (ref 95–110)
CO2 SERPL-SCNC: 27 MMOL/L (ref 23–29)
CREAT SERPL-MCNC: 0.8 MG/DL (ref 0.5–1.4)
EST. GFR  (NO RACE VARIABLE): >60 ML/MIN/1.73 M^2
GLUCOSE SERPL-MCNC: 93 MG/DL (ref 70–110)
POTASSIUM SERPL-SCNC: 4.4 MMOL/L (ref 3.5–5.1)
PROT SERPL-MCNC: 7.5 G/DL (ref 6–8.4)
PTH-INTACT SERPL-MCNC: 48.1 PG/ML (ref 9–77)
SODIUM SERPL-SCNC: 142 MMOL/L (ref 136–145)

## 2022-10-04 PROCEDURE — 1160F RVW MEDS BY RX/DR IN RCRD: CPT | Mod: CPTII,S$GLB,, | Performed by: PHYSICIAN ASSISTANT

## 2022-10-04 PROCEDURE — 99204 OFFICE O/P NEW MOD 45 MIN: CPT | Mod: 25,S$GLB,, | Performed by: PHYSICIAN ASSISTANT

## 2022-10-04 PROCEDURE — 1159F MED LIST DOCD IN RCRD: CPT | Mod: CPTII,S$GLB,, | Performed by: PHYSICIAN ASSISTANT

## 2022-10-04 PROCEDURE — 20550 NJX 1 TENDON SHEATH/LIGAMENT: CPT | Mod: FA,S$GLB,, | Performed by: PHYSICIAN ASSISTANT

## 2022-10-04 PROCEDURE — 1125F AMNT PAIN NOTED PAIN PRSNT: CPT | Mod: CPTII,S$GLB,, | Performed by: PHYSICIAN ASSISTANT

## 2022-10-04 PROCEDURE — 36415 COLL VENOUS BLD VENIPUNCTURE: CPT | Performed by: PHYSICIAN ASSISTANT

## 2022-10-04 PROCEDURE — 3288F FALL RISK ASSESSMENT DOCD: CPT | Mod: CPTII,S$GLB,, | Performed by: PHYSICIAN ASSISTANT

## 2022-10-04 PROCEDURE — 1160F PR REVIEW ALL MEDS BY PRESCRIBER/CLIN PHARMACIST DOCUMENTED: ICD-10-PCS | Mod: CPTII,S$GLB,, | Performed by: PHYSICIAN ASSISTANT

## 2022-10-04 PROCEDURE — 99999 PR PBB SHADOW E&M-EST. PATIENT-LVL V: CPT | Mod: PBBFAC,,, | Performed by: PHYSICIAN ASSISTANT

## 2022-10-04 PROCEDURE — 1159F PR MEDICATION LIST DOCUMENTED IN MEDICAL RECORD: ICD-10-PCS | Mod: CPTII,S$GLB,, | Performed by: PHYSICIAN ASSISTANT

## 2022-10-04 PROCEDURE — 1125F PR PAIN SEVERITY QUANTIFIED, PAIN PRESENT: ICD-10-PCS | Mod: CPTII,S$GLB,, | Performed by: PHYSICIAN ASSISTANT

## 2022-10-04 PROCEDURE — 3008F PR BODY MASS INDEX (BMI) DOCUMENTED: ICD-10-PCS | Mod: CPTII,S$GLB,, | Performed by: PHYSICIAN ASSISTANT

## 2022-10-04 PROCEDURE — 99204 PR OFFICE/OUTPT VISIT, NEW, LEVL IV, 45-59 MIN: ICD-10-PCS | Mod: 25,S$GLB,, | Performed by: PHYSICIAN ASSISTANT

## 2022-10-04 PROCEDURE — 1101F PR PT FALLS ASSESS DOC 0-1 FALLS W/OUT INJ PAST YR: ICD-10-PCS | Mod: CPTII,S$GLB,, | Performed by: PHYSICIAN ASSISTANT

## 2022-10-04 PROCEDURE — 20550 TENDON SHEATH: ICD-10-PCS | Mod: FA,S$GLB,, | Performed by: PHYSICIAN ASSISTANT

## 2022-10-04 PROCEDURE — 3008F BODY MASS INDEX DOCD: CPT | Mod: CPTII,S$GLB,, | Performed by: PHYSICIAN ASSISTANT

## 2022-10-04 PROCEDURE — 1101F PT FALLS ASSESS-DOCD LE1/YR: CPT | Mod: CPTII,S$GLB,, | Performed by: PHYSICIAN ASSISTANT

## 2022-10-04 PROCEDURE — 80053 COMPREHEN METABOLIC PANEL: CPT | Performed by: PHYSICIAN ASSISTANT

## 2022-10-04 PROCEDURE — 99999 PR PBB SHADOW E&M-EST. PATIENT-LVL V: ICD-10-PCS | Mod: PBBFAC,,, | Performed by: PHYSICIAN ASSISTANT

## 2022-10-04 PROCEDURE — 82306 VITAMIN D 25 HYDROXY: CPT | Performed by: PHYSICIAN ASSISTANT

## 2022-10-04 PROCEDURE — 83970 ASSAY OF PARATHORMONE: CPT | Performed by: PHYSICIAN ASSISTANT

## 2022-10-04 PROCEDURE — 3288F PR FALLS RISK ASSESSMENT DOCUMENTED: ICD-10-PCS | Mod: CPTII,S$GLB,, | Performed by: PHYSICIAN ASSISTANT

## 2022-10-04 RX ORDER — TRIAMCINOLONE ACETONIDE 40 MG/ML
40 INJECTION, SUSPENSION INTRA-ARTICULAR; INTRAMUSCULAR
Status: DISCONTINUED | OUTPATIENT
Start: 2022-10-04 | End: 2022-10-04 | Stop reason: HOSPADM

## 2022-10-04 RX ORDER — ALENDRONATE SODIUM 70 MG/1
70 TABLET ORAL
Qty: 4 TABLET | Refills: 11 | Status: SHIPPED | OUTPATIENT
Start: 2022-10-04 | End: 2023-09-13

## 2022-10-04 RX ADMIN — TRIAMCINOLONE ACETONIDE 40 MG: 40 INJECTION, SUSPENSION INTRA-ARTICULAR; INTRAMUSCULAR at 09:10

## 2022-10-04 NOTE — PROCEDURES
Tendon Sheath    Date/Time: 10/4/2022 9:00 AM  Performed by: Dian Masters PA-C  Authorized by: Dian Masters PA-C     Consent Done?:  Yes (Verbal)  Indications:  Pain  Site marked: the procedure site was marked    Timeout: prior to procedure the correct patient, procedure, and site was verified    Prep: patient was prepped and draped in usual sterile fashion      Local anesthesia used?: Yes    Local anesthetic:  Lidocaine 2% without epinephrine  Location:  Thumb  Site:  L thumb MCP  Ultrasonic guidance for needle placement?: No    Needle size:  25 G  Approach:  Volar  Medications:  40 mg triamcinolone acetonide 40 mg/mL  Patient tolerance:  Patient tolerated the procedure well with no immediate complications    Additional Comments: Left Trigger finger Injection Report:  After verbal consent was obtained for left trigger finger injection, patient ID, site, and side were verified.  The  left thumb was sterilly prepped at the A-1 pulley in standard fashion.  A 25-gauge needle was introduced at the A-1 pulley site without complication. It was then injected with 10 mg lidocaine plain and 20 mg Kenalog.  Remaining kenalog was wasted.  A sterile bandaid was applied.  The patient was informed to apply an ice pack approximately 10min once arriving home and not to do anything strenuous for 24hours. She  was instructed to call if there were any problems. The patient was discharged in stable condition.

## 2022-10-04 NOTE — PATIENT INSTRUCTIONS
Take fosamax 1st thing in morning on empty stomach with full glass of water and remain upright for 30 minutes. After 30 minutes then ok to resume regular schedule with medications and breakfast
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
## 2022-11-23 ENCOUNTER — TELEPHONE (OUTPATIENT)
Dept: PRIMARY CARE CLINIC | Facility: CLINIC | Age: 67
End: 2022-11-23
Payer: MEDICARE

## 2022-11-25 NOTE — PROGRESS NOTES
"Subjective:      Patient ID: Mouna Chandra is a 67 y.o. female.    Chief Complaint: Follow-up (Six months follow up. Pt has sharp headaches every once in a while. Pt fell and jammed her shoulder one month ago. Didn't get xray so she doesn't know how bad her injury is, also experiencing some numbness in shoulder.)      HPI  Here for follow up of medical problems.  Now taking fosamax, tolerating well.  Fell twice last month, tripped on carpet.  Then fell while bowling.  Ongoing right shoulder pain from 1 month ago.  Ibuprofen helps, 400mg bid.  No regular exercise lately.  No f/c/sw.  Some cough with sinus drip.  No exertional cp/sob/palp.  Paxil works pretty well, but "sometimes feel inadequate."  Sometimes feels extra anxious.      The 10-year ASCVD risk score (Bernarda WILKERSON, et al., 2019) is: 6.6%    Values used to calculate the score:      Age: 67 years      Sex: Female      Is Non- : No      Diabetic: No      Tobacco smoker: No      Systolic Blood Pressure: 136 mmHg      Is BP treated: No      HDL Cholesterol: 52 mg/dL      Total Cholesterol: 172 mg/dL      6/22 BMD:  FINDINGS:  The L1 to L4 vertebral bone mineral density is equal to 1.176 g/cm squared with a T score of -0.1.  There has been no significant change relative to the prior study.     The left femoral neck bone mineral density is equal to 0.862 g/cm squared with a T score of -1.3.  There has been  no significant change relative to the prior study.     There is a 23.7% risk of a major osteoporotic fracture and a 1.6% risk of hip fracture in the next 10 years (FRAX).     Impression:     Osteopenia       Updated/ annual due 5/23:  HM: 12/22 fluvax, 3/17 [id number], 6/21 [id number], 11/16 TDaP, 10/16 zostavax, 6/21 Shingrix x2, 6/22 BMD rep 2y, 1/15 Cscope rep due 10y, 7/22 MMG/me, 1/18 Eye Dr. Tobin, 5/21 HCV neg.     Review of Systems   Constitutional:  Negative for chills, diaphoresis and fever.   Respiratory:  Negative for " "cough and shortness of breath.    Cardiovascular:  Negative for chest pain, palpitations and leg swelling.   Gastrointestinal:  Negative for blood in stool, constipation, diarrhea, nausea and vomiting.   Genitourinary:  Negative for dysuria, frequency and hematuria.   Psychiatric/Behavioral:  The patient is not nervous/anxious.        Objective:   /62 (BP Location: Right arm)   Pulse 97   Temp 97.6 °F (36.4 °C) (Oral)   Ht 5' 1" (1.549 m)   Wt 85.5 kg (188 lb 8 oz)   SpO2 97%   BMI 35.62 kg/m²     Physical Exam  Constitutional:       Appearance: She is well-developed.   Neck:      Thyroid: No thyroid mass.      Vascular: No carotid bruit.   Cardiovascular:      Rate and Rhythm: Normal rate and regular rhythm.      Heart sounds: No murmur heard.    No friction rub. No gallop.   Pulmonary:      Effort: Pulmonary effort is normal.      Breath sounds: Normal breath sounds. No wheezing or rales.   Abdominal:      General: Bowel sounds are normal.      Palpations: Abdomen is soft. There is no mass.      Tenderness: There is no abdominal tenderness.   Musculoskeletal:      Cervical back: Neck supple.   Lymphadenopathy:      Cervical: No cervical adenopathy.   Neurological:      Mental Status: She is alert and oriented to person, place, and time.      Motor: Motor function is intact.           Assessment:       1. Mixed hyperlipidemia    2. Recurrent major depressive disorder, in partial remission    3. Mixed anxiety and depressive disorder    4. Age-related osteoporosis without current pathological fracture    5. Severe obesity with body mass index (BMI) of 35.0 to 35.9 and comorbidity    6. Acute pain of right shoulder          Plan:     Mixed hyperlipidemia, Severe obesity with body mass index (BMI) of 35.0 to 35.9 and comorbidity- will work on wt loss after anx/depression better controlled.    Recurrent major depressive disorder, in partial remission, Mixed anxiety and depressive disorder- refer for " counseling, add buspar for prn use.  -     busPIRone (BUSPAR) 7.5 MG tablet; Take 1 tablet (7.5 mg total) by mouth 3 (three) times daily as needed (anxiety).  Dispense: 60 tablet; Refill: 11    Age-related osteoporosis without current pathological fracture- cont fosamax.    Acute pain of right shoulder- cont nsaids, x-ray and r/o fx.  Then PT for rotator cuff strain.  -     X-Ray Shoulder Trauma 3 view Right; Future; Expected date: 12/02/2022    RTC 3 mo.

## 2022-12-02 ENCOUNTER — PATIENT MESSAGE (OUTPATIENT)
Dept: PRIMARY CARE CLINIC | Facility: CLINIC | Age: 67
End: 2022-12-02

## 2022-12-02 ENCOUNTER — OFFICE VISIT (OUTPATIENT)
Dept: PRIMARY CARE CLINIC | Facility: CLINIC | Age: 67
End: 2022-12-02
Payer: MEDICARE

## 2022-12-02 ENCOUNTER — HOSPITAL ENCOUNTER (OUTPATIENT)
Dept: RADIOLOGY | Facility: HOSPITAL | Age: 67
Discharge: HOME | End: 2022-12-02
Attending: INTERNAL MEDICINE
Payer: MEDICARE

## 2022-12-02 VITALS
DIASTOLIC BLOOD PRESSURE: 62 MMHG | HEIGHT: 61 IN | OXYGEN SATURATION: 97 % | SYSTOLIC BLOOD PRESSURE: 136 MMHG | TEMPERATURE: 98 F | BODY MASS INDEX: 35.59 KG/M2 | HEART RATE: 97 BPM | WEIGHT: 188.5 LBS

## 2022-12-02 DIAGNOSIS — E78.2 MIXED HYPERLIPIDEMIA: Primary | Chronic | ICD-10-CM

## 2022-12-02 DIAGNOSIS — E66.01 SEVERE OBESITY WITH BODY MASS INDEX (BMI) OF 35.0 TO 35.9 AND COMORBIDITY: ICD-10-CM

## 2022-12-02 DIAGNOSIS — F33.41 RECURRENT MAJOR DEPRESSIVE DISORDER, IN PARTIAL REMISSION: ICD-10-CM

## 2022-12-02 DIAGNOSIS — M25.511 ACUTE PAIN OF RIGHT SHOULDER: ICD-10-CM

## 2022-12-02 DIAGNOSIS — F41.8 MIXED ANXIETY AND DEPRESSIVE DISORDER: ICD-10-CM

## 2022-12-02 DIAGNOSIS — M81.0 AGE-RELATED OSTEOPOROSIS WITHOUT CURRENT PATHOLOGICAL FRACTURE: ICD-10-CM

## 2022-12-02 PROCEDURE — 1125F PR PAIN SEVERITY QUANTIFIED, PAIN PRESENT: ICD-10-PCS | Mod: CPTII,S$GLB,, | Performed by: INTERNAL MEDICINE

## 2022-12-02 PROCEDURE — 3078F PR MOST RECENT DIASTOLIC BLOOD PRESSURE < 80 MM HG: ICD-10-PCS | Mod: CPTII,S$GLB,, | Performed by: INTERNAL MEDICINE

## 2022-12-02 PROCEDURE — 99999 PR PBB SHADOW E&M-EST. PATIENT-LVL IV: ICD-10-PCS | Mod: PBBFAC,,, | Performed by: INTERNAL MEDICINE

## 2022-12-02 PROCEDURE — 3288F PR FALLS RISK ASSESSMENT DOCUMENTED: ICD-10-PCS | Mod: CPTII,S$GLB,, | Performed by: INTERNAL MEDICINE

## 2022-12-02 PROCEDURE — 99214 OFFICE O/P EST MOD 30 MIN: CPT | Mod: S$GLB,,, | Performed by: INTERNAL MEDICINE

## 2022-12-02 PROCEDURE — 3078F DIAST BP <80 MM HG: CPT | Mod: CPTII,S$GLB,, | Performed by: INTERNAL MEDICINE

## 2022-12-02 PROCEDURE — 3008F PR BODY MASS INDEX (BMI) DOCUMENTED: ICD-10-PCS | Mod: CPTII,S$GLB,, | Performed by: INTERNAL MEDICINE

## 2022-12-02 PROCEDURE — 1125F AMNT PAIN NOTED PAIN PRSNT: CPT | Mod: CPTII,S$GLB,, | Performed by: INTERNAL MEDICINE

## 2022-12-02 PROCEDURE — 73030 X-RAY EXAM OF SHOULDER: CPT | Mod: TC,FY,PO,RT

## 2022-12-02 PROCEDURE — 3075F PR MOST RECENT SYSTOLIC BLOOD PRESS GE 130-139MM HG: ICD-10-PCS | Mod: CPTII,S$GLB,, | Performed by: INTERNAL MEDICINE

## 2022-12-02 PROCEDURE — 73030 X-RAY EXAM OF SHOULDER: CPT | Mod: 26,RT,, | Performed by: STUDENT IN AN ORGANIZED HEALTH CARE EDUCATION/TRAINING PROGRAM

## 2022-12-02 PROCEDURE — 3075F SYST BP GE 130 - 139MM HG: CPT | Mod: CPTII,S$GLB,, | Performed by: INTERNAL MEDICINE

## 2022-12-02 PROCEDURE — 3288F FALL RISK ASSESSMENT DOCD: CPT | Mod: CPTII,S$GLB,, | Performed by: INTERNAL MEDICINE

## 2022-12-02 PROCEDURE — 99214 PR OFFICE/OUTPT VISIT, EST, LEVL IV, 30-39 MIN: ICD-10-PCS | Mod: S$GLB,,, | Performed by: INTERNAL MEDICINE

## 2022-12-02 PROCEDURE — 99999 PR PBB SHADOW E&M-EST. PATIENT-LVL IV: CPT | Mod: PBBFAC,,, | Performed by: INTERNAL MEDICINE

## 2022-12-02 PROCEDURE — 1100F PR PT FALLS ASSESS DOC 2+ FALLS/FALL W/INJURY/YR: ICD-10-PCS | Mod: CPTII,S$GLB,, | Performed by: INTERNAL MEDICINE

## 2022-12-02 PROCEDURE — 1159F PR MEDICATION LIST DOCUMENTED IN MEDICAL RECORD: ICD-10-PCS | Mod: CPTII,S$GLB,, | Performed by: INTERNAL MEDICINE

## 2022-12-02 PROCEDURE — 99499 UNLISTED E&M SERVICE: CPT | Mod: S$PBB,HCNC,, | Performed by: INTERNAL MEDICINE

## 2022-12-02 PROCEDURE — 3008F BODY MASS INDEX DOCD: CPT | Mod: CPTII,S$GLB,, | Performed by: INTERNAL MEDICINE

## 2022-12-02 PROCEDURE — 73030 XR SHOULDER TRAUMA 3 VIEW RIGHT: ICD-10-PCS | Mod: 26,RT,, | Performed by: STUDENT IN AN ORGANIZED HEALTH CARE EDUCATION/TRAINING PROGRAM

## 2022-12-02 PROCEDURE — 1100F PTFALLS ASSESS-DOCD GE2>/YR: CPT | Mod: CPTII,S$GLB,, | Performed by: INTERNAL MEDICINE

## 2022-12-02 PROCEDURE — 1159F MED LIST DOCD IN RCRD: CPT | Mod: CPTII,S$GLB,, | Performed by: INTERNAL MEDICINE

## 2022-12-02 PROCEDURE — 99499 RISK ADDL DX/OHS AUDIT: ICD-10-PCS | Mod: S$PBB,HCNC,, | Performed by: INTERNAL MEDICINE

## 2022-12-02 RX ORDER — BUSPIRONE HYDROCHLORIDE 7.5 MG/1
7.5 TABLET ORAL 3 TIMES DAILY PRN
Qty: 60 TABLET | Refills: 11 | Status: SHIPPED | OUTPATIENT
Start: 2022-12-02 | End: 2023-08-10 | Stop reason: SDUPTHER

## 2022-12-05 ENCOUNTER — DOCUMENTATION ONLY (OUTPATIENT)
Dept: PRIMARY CARE CLINIC | Facility: CLINIC | Age: 67
End: 2022-12-05
Payer: MEDICARE

## 2022-12-05 NOTE — PROGRESS NOTES
STEVEN reached out to patient about starting therapy. She is experiencing increased anxiety and does feel therapy would benefit her mental health, but she would like to wait until January when [facility] 65+ has a full time  available since STEVEN is leaving at the end of the month and she doesn't want to start over with a new therapist at that time. STEVEN added her to the referral list for follow up.

## 2022-12-07 ENCOUNTER — PATIENT MESSAGE (OUTPATIENT)
Dept: PRIMARY CARE CLINIC | Facility: CLINIC | Age: 67
End: 2022-12-07
Payer: MEDICARE

## 2023-01-13 ENCOUNTER — DOCUMENTATION ONLY (OUTPATIENT)
Dept: PRIMARY CARE CLINIC | Facility: CLINIC | Age: 68
End: 2023-01-13
Payer: MEDICARE

## 2023-01-13 NOTE — PROGRESS NOTES
Spoke w/ patient by phone this afternoon.. She reports that she is doing much better and is not interested in pursuing individual counseling/therapy at this time. SW encouraged pt to call back and schedule an appointment if she changes her mind at any time.

## 2023-02-09 ENCOUNTER — LAB VISIT (OUTPATIENT)
Dept: LAB | Facility: HOSPITAL | Age: 68
End: 2023-02-09
Attending: PHYSICIAN ASSISTANT
Payer: MEDICARE

## 2023-02-09 ENCOUNTER — PATIENT MESSAGE (OUTPATIENT)
Dept: PAIN MEDICINE | Facility: CLINIC | Age: 68
End: 2023-02-09
Payer: MEDICARE

## 2023-02-09 DIAGNOSIS — M85.80 OSTEOPENIA WITH HIGH RISK OF FRACTURE: ICD-10-CM

## 2023-02-09 LAB
25(OH)D3+25(OH)D2 SERPL-MCNC: 48 NG/ML (ref 30–96)
ALBUMIN SERPL BCP-MCNC: 4.1 G/DL (ref 3.5–5.2)
ALP SERPL-CCNC: 50 U/L (ref 55–135)
ALT SERPL W/O P-5'-P-CCNC: 17 U/L (ref 10–44)
ANION GAP SERPL CALC-SCNC: 10 MMOL/L (ref 8–16)
AST SERPL-CCNC: 18 U/L (ref 10–40)
BILIRUB SERPL-MCNC: 0.4 MG/DL (ref 0.1–1)
BUN SERPL-MCNC: 22 MG/DL (ref 8–23)
CALCIUM SERPL-MCNC: 9.3 MG/DL (ref 8.7–10.5)
CHLORIDE SERPL-SCNC: 105 MMOL/L (ref 95–110)
CO2 SERPL-SCNC: 23 MMOL/L (ref 23–29)
CREAT SERPL-MCNC: 0.8 MG/DL (ref 0.5–1.4)
EST. GFR  (NO RACE VARIABLE): >60 ML/MIN/1.73 M^2
GLUCOSE SERPL-MCNC: 92 MG/DL (ref 70–110)
POTASSIUM SERPL-SCNC: 4.4 MMOL/L (ref 3.5–5.1)
PROT SERPL-MCNC: 7.2 G/DL (ref 6–8.4)
SODIUM SERPL-SCNC: 138 MMOL/L (ref 136–145)

## 2023-02-09 PROCEDURE — 80053 COMPREHEN METABOLIC PANEL: CPT | Mod: HCNC | Performed by: PHYSICIAN ASSISTANT

## 2023-02-09 PROCEDURE — 36415 COLL VENOUS BLD VENIPUNCTURE: CPT | Mod: HCNC | Performed by: PHYSICIAN ASSISTANT

## 2023-02-09 PROCEDURE — 82306 VITAMIN D 25 HYDROXY: CPT | Mod: HCNC | Performed by: PHYSICIAN ASSISTANT

## 2023-02-16 ENCOUNTER — HOSPITAL ENCOUNTER (OUTPATIENT)
Dept: RADIOLOGY | Facility: HOSPITAL | Age: 68
Discharge: HOME | End: 2023-02-16
Attending: PHYSICIAN ASSISTANT
Payer: MEDICARE

## 2023-02-16 ENCOUNTER — OFFICE VISIT (OUTPATIENT)
Dept: RHEUMATOLOGY | Facility: CLINIC | Age: 68
End: 2023-02-16
Payer: MEDICARE

## 2023-02-16 VITALS
BODY MASS INDEX: 34.55 KG/M2 | WEIGHT: 183 LBS | DIASTOLIC BLOOD PRESSURE: 73 MMHG | SYSTOLIC BLOOD PRESSURE: 115 MMHG | HEIGHT: 61 IN | HEART RATE: 75 BPM

## 2023-02-16 DIAGNOSIS — Z51.81 MEDICATION MONITORING ENCOUNTER: ICD-10-CM

## 2023-02-16 DIAGNOSIS — M79.641 RIGHT HAND PAIN: ICD-10-CM

## 2023-02-16 DIAGNOSIS — R22.31 MASS OF FINGER OF RIGHT HAND: ICD-10-CM

## 2023-02-16 DIAGNOSIS — M85.80 OSTEOPENIA WITH HIGH RISK OF FRACTURE: Primary | ICD-10-CM

## 2023-02-16 DIAGNOSIS — M65.312 TRIGGER FINGER OF LEFT THUMB: ICD-10-CM

## 2023-02-16 PROCEDURE — 1160F PR REVIEW ALL MEDS BY PRESCRIBER/CLIN PHARMACIST DOCUMENTED: ICD-10-PCS | Mod: HCNC,CPTII,S$GLB, | Performed by: PHYSICIAN ASSISTANT

## 2023-02-16 PROCEDURE — 1125F AMNT PAIN NOTED PAIN PRSNT: CPT | Mod: HCNC,CPTII,S$GLB, | Performed by: PHYSICIAN ASSISTANT

## 2023-02-16 PROCEDURE — 99214 OFFICE O/P EST MOD 30 MIN: CPT | Mod: HCNC,S$GLB,, | Performed by: PHYSICIAN ASSISTANT

## 2023-02-16 PROCEDURE — 73130 X-RAY EXAM OF HAND: CPT | Mod: 26,HCNC,RT, | Performed by: RADIOLOGY

## 2023-02-16 PROCEDURE — 3078F PR MOST RECENT DIASTOLIC BLOOD PRESSURE < 80 MM HG: ICD-10-PCS | Mod: HCNC,CPTII,S$GLB, | Performed by: PHYSICIAN ASSISTANT

## 2023-02-16 PROCEDURE — 3074F SYST BP LT 130 MM HG: CPT | Mod: HCNC,CPTII,S$GLB, | Performed by: PHYSICIAN ASSISTANT

## 2023-02-16 PROCEDURE — 73130 X-RAY EXAM OF HAND: CPT | Mod: TC,HCNC,RT

## 2023-02-16 PROCEDURE — 3008F PR BODY MASS INDEX (BMI) DOCUMENTED: ICD-10-PCS | Mod: HCNC,CPTII,S$GLB, | Performed by: PHYSICIAN ASSISTANT

## 2023-02-16 PROCEDURE — 1100F PR PT FALLS ASSESS DOC 2+ FALLS/FALL W/INJURY/YR: ICD-10-PCS | Mod: HCNC,CPTII,S$GLB, | Performed by: PHYSICIAN ASSISTANT

## 2023-02-16 PROCEDURE — 1100F PTFALLS ASSESS-DOCD GE2>/YR: CPT | Mod: HCNC,CPTII,S$GLB, | Performed by: PHYSICIAN ASSISTANT

## 2023-02-16 PROCEDURE — 3288F FALL RISK ASSESSMENT DOCD: CPT | Mod: HCNC,CPTII,S$GLB, | Performed by: PHYSICIAN ASSISTANT

## 2023-02-16 PROCEDURE — 3078F DIAST BP <80 MM HG: CPT | Mod: HCNC,CPTII,S$GLB, | Performed by: PHYSICIAN ASSISTANT

## 2023-02-16 PROCEDURE — 99999 PR PBB SHADOW E&M-EST. PATIENT-LVL IV: CPT | Mod: PBBFAC,HCNC,, | Performed by: PHYSICIAN ASSISTANT

## 2023-02-16 PROCEDURE — 3008F BODY MASS INDEX DOCD: CPT | Mod: HCNC,CPTII,S$GLB, | Performed by: PHYSICIAN ASSISTANT

## 2023-02-16 PROCEDURE — 99999 PR PBB SHADOW E&M-EST. PATIENT-LVL IV: ICD-10-PCS | Mod: PBBFAC,HCNC,, | Performed by: PHYSICIAN ASSISTANT

## 2023-02-16 PROCEDURE — 1160F RVW MEDS BY RX/DR IN RCRD: CPT | Mod: HCNC,CPTII,S$GLB, | Performed by: PHYSICIAN ASSISTANT

## 2023-02-16 PROCEDURE — 1125F PR PAIN SEVERITY QUANTIFIED, PAIN PRESENT: ICD-10-PCS | Mod: HCNC,CPTII,S$GLB, | Performed by: PHYSICIAN ASSISTANT

## 2023-02-16 PROCEDURE — 1159F PR MEDICATION LIST DOCUMENTED IN MEDICAL RECORD: ICD-10-PCS | Mod: HCNC,CPTII,S$GLB, | Performed by: PHYSICIAN ASSISTANT

## 2023-02-16 PROCEDURE — 3074F PR MOST RECENT SYSTOLIC BLOOD PRESSURE < 130 MM HG: ICD-10-PCS | Mod: HCNC,CPTII,S$GLB, | Performed by: PHYSICIAN ASSISTANT

## 2023-02-16 PROCEDURE — 99214 PR OFFICE/OUTPT VISIT, EST, LEVL IV, 30-39 MIN: ICD-10-PCS | Mod: HCNC,S$GLB,, | Performed by: PHYSICIAN ASSISTANT

## 2023-02-16 PROCEDURE — 1159F MED LIST DOCD IN RCRD: CPT | Mod: HCNC,CPTII,S$GLB, | Performed by: PHYSICIAN ASSISTANT

## 2023-02-16 PROCEDURE — 3288F PR FALLS RISK ASSESSMENT DOCUMENTED: ICD-10-PCS | Mod: HCNC,CPTII,S$GLB, | Performed by: PHYSICIAN ASSISTANT

## 2023-02-16 PROCEDURE — 73130 XR HAND COMPLETE 3 VIEW RIGHT: ICD-10-PCS | Mod: 26,HCNC,RT, | Performed by: RADIOLOGY
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
## 2023-02-17 ENCOUNTER — OFFICE VISIT (OUTPATIENT)
Dept: OPHTHALMOLOGY | Facility: CLINIC | Age: 68
End: 2023-02-17
Payer: MEDICARE

## 2023-02-17 ENCOUNTER — TELEPHONE (OUTPATIENT)
Dept: RHEUMATOLOGY | Facility: CLINIC | Age: 68
End: 2023-02-17
Payer: MEDICARE

## 2023-02-17 DIAGNOSIS — H43.812 PVD (POSTERIOR VITREOUS DETACHMENT), LEFT EYE: Primary | ICD-10-CM

## 2023-02-17 DIAGNOSIS — H25.043 POSTERIOR SUBCAPSULAR AGE-RELATED CATARACT OF BOTH EYES: ICD-10-CM

## 2023-02-17 DIAGNOSIS — H35.3133 ADVANCED ATROPHIC NONEXUDATIVE AGE-RELATED MACULAR DEGENERATION OF BOTH EYES WITHOUT SUBFOVEAL INVOLVEMENT: ICD-10-CM

## 2023-02-17 DIAGNOSIS — H35.363 FAMILIAL DRUSEN OF BOTH EYES: ICD-10-CM

## 2023-02-17 PROCEDURE — 1160F PR REVIEW ALL MEDS BY PRESCRIBER/CLIN PHARMACIST DOCUMENTED: ICD-10-PCS | Mod: HCNC,CPTII,S$GLB, | Performed by: OPHTHALMOLOGY

## 2023-02-17 PROCEDURE — 99999 PR PBB SHADOW E&M-EST. PATIENT-LVL III: CPT | Mod: PBBFAC,HCNC,, | Performed by: OPHTHALMOLOGY

## 2023-02-17 PROCEDURE — 92134 POSTERIOR SEGMENT OCT RETINA (OCULAR COHERENCE TOMOGRAPHY)-BOTH EYES: ICD-10-PCS | Mod: HCNC,S$GLB,, | Performed by: OPHTHALMOLOGY

## 2023-02-17 PROCEDURE — 1159F PR MEDICATION LIST DOCUMENTED IN MEDICAL RECORD: ICD-10-PCS | Mod: HCNC,CPTII,S$GLB, | Performed by: OPHTHALMOLOGY

## 2023-02-17 PROCEDURE — 92014 PR EYE EXAM, EST PATIENT,COMPREHESV: ICD-10-PCS | Mod: HCNC,S$GLB,, | Performed by: OPHTHALMOLOGY

## 2023-02-17 PROCEDURE — 99999 PR PBB SHADOW E&M-EST. PATIENT-LVL III: ICD-10-PCS | Mod: PBBFAC,HCNC,, | Performed by: OPHTHALMOLOGY

## 2023-02-17 PROCEDURE — 92134 CPTRZ OPH DX IMG PST SGM RTA: CPT | Mod: HCNC,S$GLB,, | Performed by: OPHTHALMOLOGY

## 2023-02-17 PROCEDURE — 1160F RVW MEDS BY RX/DR IN RCRD: CPT | Mod: HCNC,CPTII,S$GLB, | Performed by: OPHTHALMOLOGY

## 2023-02-17 PROCEDURE — 1159F MED LIST DOCD IN RCRD: CPT | Mod: HCNC,CPTII,S$GLB, | Performed by: OPHTHALMOLOGY

## 2023-02-17 PROCEDURE — 92014 COMPRE OPH EXAM EST PT 1/>: CPT | Mod: HCNC,S$GLB,, | Performed by: OPHTHALMOLOGY

## 2023-02-17 NOTE — PROGRESS NOTES
Please let pt know that the area on her right finger looks like a calcification on xray- she needs to keep her follow up with orthopedics for further evaluation and treatment

## 2023-02-17 NOTE — TELEPHONE ENCOUNTER
----- Message from Rebecca Murillo PA-C sent at 2/17/2023  8:37 AM CST -----  Please let pt know that the area on her right finger looks like a calcification on xray- she needs to keep her follow up with orthopedics for further evaluation and treatment

## 2023-02-17 NOTE — PROGRESS NOTES
===============================  Date today is 2/17/2023  Mouna Chandra is a 68 y.o. female  Last visit [facility]: :2/11/2022   Last visit eye dept. Visit date not found    Corrected distance visual acuity was 20/40 in the right eye and 20/30 in the left eye.  Tonometry       Tonometry (Applanation, 2:36 PM)         Right Left    Pressure 18 15                  Not recorded       Not recorded       Not recorded       Chief Complaint   Patient presents with    myopic degeneration / erm     Pt states va is not sharp like it should be     HPI     myopic degeneration / erm     Additional comments: Pt states va is not sharp like it should be           Comments    Familial drusen  blepharitis          Last edited by FATOUMATA Blunt on 2/17/2023  2:24 PM.      Problem List Items Addressed This Visit          Eye/Vision problems    Familial drusen of both eyes    Relevant Orders    Posterior Segment OCT Retina-Both eyes (Completed)    Advanced atrophic nonexudative age-related macular degeneration of both eyes without subfoveal involvement    Relevant Orders    Posterior Segment OCT Retina-Both eyes (Completed)     Other Visit Diagnoses       PVD (posterior vitreous detachment), left eye    -  Primary    Relevant Orders    Posterior Segment OCT Retina-Both eyes (Completed)    Posterior subcapsular age-related cataract of both eyes              Instructed to call 24/7 for any worsening of vision, visual distortion or pain.  Check OU independently daily.    Gave my office and personal cell phone number.  ________________  2/17/2023 today  Mouna Chandra  :  Geographic atrophy   ERM   PVD persistent floaters- periphery ok  1+ PSC OD  2+ PSC OS-follow    RTC 1 year  Instructed to call 24/7 for any worsening of vision or symptoms. Check OU daily.   Gave my office and cell phone number.      =============================

## 2023-02-28 ENCOUNTER — OFFICE VISIT (OUTPATIENT)
Dept: ORTHOPEDICS | Facility: CLINIC | Age: 68
End: 2023-02-28
Payer: MEDICARE

## 2023-02-28 VITALS — BODY MASS INDEX: 34.36 KG/M2 | WEIGHT: 182 LBS | HEIGHT: 61 IN

## 2023-02-28 DIAGNOSIS — M79.641 RIGHT HAND PAIN: ICD-10-CM

## 2023-02-28 DIAGNOSIS — R22.31 MASS OF FINGER OF RIGHT HAND: ICD-10-CM

## 2023-02-28 PROCEDURE — 99999 PR PBB SHADOW E&M-EST. PATIENT-LVL III: CPT | Mod: PBBFAC,HCNC,, | Performed by: ORTHOPAEDIC SURGERY

## 2023-02-28 PROCEDURE — 1125F AMNT PAIN NOTED PAIN PRSNT: CPT | Mod: HCNC,CPTII,S$GLB, | Performed by: ORTHOPAEDIC SURGERY

## 2023-02-28 PROCEDURE — 1101F PT FALLS ASSESS-DOCD LE1/YR: CPT | Mod: HCNC,CPTII,S$GLB, | Performed by: ORTHOPAEDIC SURGERY

## 2023-02-28 PROCEDURE — 99204 OFFICE O/P NEW MOD 45 MIN: CPT | Mod: HCNC,S$GLB,, | Performed by: ORTHOPAEDIC SURGERY

## 2023-02-28 PROCEDURE — 3008F BODY MASS INDEX DOCD: CPT | Mod: HCNC,CPTII,S$GLB, | Performed by: ORTHOPAEDIC SURGERY

## 2023-02-28 PROCEDURE — 3288F FALL RISK ASSESSMENT DOCD: CPT | Mod: HCNC,CPTII,S$GLB, | Performed by: ORTHOPAEDIC SURGERY

## 2023-02-28 PROCEDURE — 3288F PR FALLS RISK ASSESSMENT DOCUMENTED: ICD-10-PCS | Mod: HCNC,CPTII,S$GLB, | Performed by: ORTHOPAEDIC SURGERY

## 2023-02-28 PROCEDURE — 1101F PR PT FALLS ASSESS DOC 0-1 FALLS W/OUT INJ PAST YR: ICD-10-PCS | Mod: HCNC,CPTII,S$GLB, | Performed by: ORTHOPAEDIC SURGERY

## 2023-02-28 PROCEDURE — 1125F PR PAIN SEVERITY QUANTIFIED, PAIN PRESENT: ICD-10-PCS | Mod: HCNC,CPTII,S$GLB, | Performed by: ORTHOPAEDIC SURGERY

## 2023-02-28 PROCEDURE — 1160F RVW MEDS BY RX/DR IN RCRD: CPT | Mod: HCNC,CPTII,S$GLB, | Performed by: ORTHOPAEDIC SURGERY

## 2023-02-28 PROCEDURE — 1160F PR REVIEW ALL MEDS BY PRESCRIBER/CLIN PHARMACIST DOCUMENTED: ICD-10-PCS | Mod: HCNC,CPTII,S$GLB, | Performed by: ORTHOPAEDIC SURGERY

## 2023-02-28 PROCEDURE — 1159F MED LIST DOCD IN RCRD: CPT | Mod: HCNC,CPTII,S$GLB, | Performed by: ORTHOPAEDIC SURGERY

## 2023-02-28 PROCEDURE — 99999 PR PBB SHADOW E&M-EST. PATIENT-LVL III: ICD-10-PCS | Mod: PBBFAC,HCNC,, | Performed by: ORTHOPAEDIC SURGERY

## 2023-02-28 PROCEDURE — 3008F PR BODY MASS INDEX (BMI) DOCUMENTED: ICD-10-PCS | Mod: HCNC,CPTII,S$GLB, | Performed by: ORTHOPAEDIC SURGERY

## 2023-02-28 PROCEDURE — 99204 PR OFFICE/OUTPT VISIT, NEW, LEVL IV, 45-59 MIN: ICD-10-PCS | Mod: HCNC,S$GLB,, | Performed by: ORTHOPAEDIC SURGERY

## 2023-02-28 PROCEDURE — 1159F PR MEDICATION LIST DOCUMENTED IN MEDICAL RECORD: ICD-10-PCS | Mod: HCNC,CPTII,S$GLB, | Performed by: ORTHOPAEDIC SURGERY

## 2023-02-28 NOTE — H&P (VIEW-ONLY)
Subjective:     Patient ID: Mouna Chandra is a 68 y.o. female.    Chief Complaint: Pain of the Right Hand      HPI:  The patient is a 68-year-old female with a ganglion cyst dorsal right index metacarpophalangeal joint just radial to the extensor tendon about a cm in size.  This waxes and wanes in size.  She wishes to have it excised    Past Medical History:   Diagnosis Date    Anxiety 12/21/2016    Anxiety and depression     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 Chandra    Palpitation 12/21/2016    Panic attack 12/21/2016    Vitamin D deficiency      Past Surgical History:   Procedure Laterality Date    HYSTERECTOMY      INJECTION OF ANESTHETIC AGENT AROUND MEDIAL BRANCH NERVES INNERVATING CERVICAL FACET JOINT Bilateral 4/14/2021    Procedure: Bilateral C3-5 MBB with RN IV sedation;  Surgeon: Steve Gutierrez MD;  Location: [facility];  Service: Pain Management;  Laterality: Bilateral;    SELECTIVE INJECTION OF ANESTHETIC AGENT AROUND LUMBAR SPINAL NERVE ROOT BY TRANSFORAMINAL APPROACH Right 2/25/2021    Procedure: BLOCK, SPINAL NERVE ROOT, LUMBAR, SELECTIVE, TRANSFORAMINAL APPROACH Right L4-5, l5-S1 RN IV sedation;  Surgeon: Steve Gutierrez MD;  Location: [facility];  Service: Pain Management;  Laterality: Right;    TOTAL ABDOMINAL HYSTERECTOMY W/ BILATERAL SALPINGOOPHORECTOMY  2002     Family History   Problem Relation Age of Onset    Diabetes Mother     Hypertension Mother     Heart failure Father     Heart attack Father     Macular degeneration Sister     Amblyopia Sister     Cataracts Paternal Uncle     Heart failure Paternal Uncle     Stroke Paternal Uncle     Heart failure Paternal Grandfather     Heart disease Brother 45    Crohn's disease Brother     Heart attack Paternal Grandmother     Blindness Neg Hx     Cancer Neg Hx     Glaucoma Neg Hx     Retinal detachment Neg Hx     Strabismus Neg Hx      Thyroid disease Neg Hx     Colon cancer Neg Hx      Social History     Socioeconomic History    Marital status:     Number of children: 0   Occupational History    Occupation: Medical Billing     Employer: [employer]     Comment: [employer]   Tobacco Use    Smoking status: Passive Smoke Exposure - Never Smoker    Smokeless tobacco: Never   Substance and Sexual Activity    Alcohol use: Yes     Alcohol/week: 1.0 standard drink     Types: 1 Standard drinks or equivalent per week     Comment: Socially    Drug use: No    Sexual activity: Yes     Partners: Male   Social History Narrative         Social Determinants of Health     Financial Resource Strain: Low Risk     Difficulty of Paying Living Expenses: Not hard at all   Food Insecurity: No Food Insecurity    Worried About Running Out of Food in the Last Year: Never true    Ran Out of Food in the Last Year: Never true   Transportation Needs: No Transportation Needs    Lack of Transportation (Medical): No    Lack of Transportation (Non-Medical): No   Physical Activity: Sufficiently Active    Days of Exercise per Week: 3 days    Minutes of Exercise per Session: 150+ min   Stress: Stress Concern Present    Feeling of Stress : To some extent   Social Connections: Socially Isolated    Frequency of Communication with Friends and Family: More than three times a week    Frequency of Social Gatherings with Friends and Family: More than three times a week    Attends [employer] Services: Never    Active Member of Clubs or Organizations: No    Attends Club or Organization Meetings: Never    Marital Status:    Housing Stability: Unknown    Unable to Pay for Housing in the Last Year: No    Unstable Housing in the Last Year: No     Medication List with Changes/Refills   Current Medications    ALENDRONATE (FOSAMAX) 70 MG TABLET    Take 1 tablet (70 mg total) by mouth every 7 days.    BUSPIRONE (BUSPAR) 7.5 MG TABLET    Take 1 tablet (7.5 mg  total) by mouth 3 (three) times daily as needed (anxiety).    CHOLECALCIFEROL, VITAMIN D3, (VITAMIN D3) 50 MCG (2,000 UNIT) CAP CAPSULE    Take 1 capsule (2,000 Units total) by mouth once daily.    DICLOFENAC SODIUM (VOLTAREN) 1 % GEL    APPLY 2 GRAMS TOPICALLY TO THE AFFECTED AREA EVERY DAY    IBUPROFEN (ADVIL,MOTRIN) 800 MG TABLET    Take 800 mg by mouth every 6 (six) hours as needed for Pain.    LOVASTATIN (MEVACOR) 40 MG TABLET    TAKE 1 TABLET(40 MG) BY MOUTH EVERY EVENING    MAGNESIUM 250 MG TAB    Take 250 mg by mouth once.    PAROXETINE (PAXIL) 40 MG TABLET    TAKE 1 TABLET(40 MG) BY MOUTH EVERY MORNING    TRAZODONE (DESYREL) 50 MG TABLET    TAKE 1 AND 1/2 TABLETS(75 MG) BY MOUTH EVERY EVENING    VIT A/VIT C/VIT E/ZINC/COPPER (OCUVITE PRESERVISION ORAL)    Take by mouth once daily.    VITAMIN E 400 UNIT CAPSULE    Take 400 Units by mouth once daily.     Review of patient's allergies indicates:   Allergen Reactions    Adhesive Rash    Codeine Nausea And Vomiting    Iodine     Iodine containing multivitamin Nausea Only    Lanolin Hives    Latex, natural rubber Hives    Neosporin [benzalkonium chloride] Hives    Shellfish containing products Nausea And Vomiting    Neosporin (neomycin-polymyx) Hives, Itching and Rash     Review of Systems   Constitutional: Negative for malaise/fatigue.   HENT:  Negative for hearing loss.    Eyes:  Positive for visual disturbance. Negative for double vision.   Cardiovascular:  Positive for chest pain and palpitations.   Respiratory:  Negative for shortness of breath.    Endocrine: Negative for cold intolerance.   Hematologic/Lymphatic: Does not bruise/bleed easily.   Skin:  Negative for poor wound healing and suspicious lesions.   Musculoskeletal:  Positive for back pain and neck pain. Negative for gout, joint pain and joint swelling.   Gastrointestinal:  Positive for heartburn. Negative for nausea and vomiting.   Genitourinary:  Negative for dysuria.   Neurological:  Negative  for numbness, paresthesias and sensory change.   Psychiatric/Behavioral:  Positive for altered mental status and depression. Negative for memory loss and substance abuse. The patient is nervous/anxious.    Allergic/Immunologic: Negative for persistent infections.     Objective:   Body mass index is 34.39 kg/m².  There were no vitals filed for this visit.             General    Constitutional: She is oriented to person, place, and time. She appears well-developed and well-nourished. No distress.   HENT:   Head: Normocephalic.   Mouth/Throat: Oropharynx is clear and moist.   Eyes: EOM are normal.   Cardiovascular:  Normal rate.            Pulmonary/Chest: Effort normal.   Abdominal: Soft.   Neurological: She is alert and oriented to person, place, and time. No cranial nerve deficit.   Psychiatric: She has a normal mood and affect.             Right Hand/Wrist Exam     Inspection   Scars: Wrist - absent Hand -  absent  Effusion: Wrist - absent Hand -  present    Other     Neuorologic Exam    Median Distribution: normal  Ulnar Distribution: normal  Radial Distribution: normal    Comments:  The patient has a 1 cm dorsal radial ganglion cyst of the right index finger metacarpophalangeal joint.  There is pain at the extremes of motion.  There are no motor or sensory deficits.          Vascular Exam       Capillary Refill  Right Hand: normal capillary refill        Relevant imaging results reviewed and interpreted by me, discussed with the patient and / or family today radiographs right hand showed degenerative change in multiple small joints  Assessment:     Encounter Diagnoses   Name Primary?    Right hand pain     Mass of finger of right hand     Ganglion cyst right index finger dorsal metacarpophalangeal joint    Plan:     The patient wishes to have excision ganglion cyst right index finger dorsal metacarpophalangeal joint.  Risk complications and alternatives were discussed including the risk of infection, anesthetic  risk, injury to nerves and vessels, loss of motion, and a 5% recurrence rate was quoted.  She seems to understand and agree that surgery.  All questions were answered.                Disclaimer: This note was prepared using a voice recognition system and is likely to have sound alike errors within the text.

## 2023-02-28 NOTE — PROGRESS NOTES
Subjective:     Patient ID: Mouna Chandra is a 68 y.o. female.    Chief Complaint: Pain of the Right Hand      HPI:  The patient is a 68-year-old female with a ganglion cyst dorsal right index metacarpophalangeal joint just radial to the extensor tendon about a cm in size.  This waxes and wanes in size.  She wishes to have it excised    Past Medical History:   Diagnosis Date    Anxiety 12/21/2016    Anxiety and depression     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 Chandra    Palpitation 12/21/2016    Panic attack 12/21/2016    Vitamin D deficiency      Past Surgical History:   Procedure Laterality Date    HYSTERECTOMY      INJECTION OF ANESTHETIC AGENT AROUND MEDIAL BRANCH NERVES INNERVATING CERVICAL FACET JOINT Bilateral 4/14/2021    Procedure: Bilateral C3-5 MBB with RN IV sedation;  Surgeon: Steve Gutierrez MD;  Location: [facility];  Service: Pain Management;  Laterality: Bilateral;    SELECTIVE INJECTION OF ANESTHETIC AGENT AROUND LUMBAR SPINAL NERVE ROOT BY TRANSFORAMINAL APPROACH Right 2/25/2021    Procedure: BLOCK, SPINAL NERVE ROOT, LUMBAR, SELECTIVE, TRANSFORAMINAL APPROACH Right L4-5, l5-S1 RN IV sedation;  Surgeon: Steve Gutierrez MD;  Location: [facility];  Service: Pain Management;  Laterality: Right;    TOTAL ABDOMINAL HYSTERECTOMY W/ BILATERAL SALPINGOOPHORECTOMY  2002     Family History   Problem Relation Age of Onset    Diabetes Mother     Hypertension Mother     Heart failure Father     Heart attack Father     Macular degeneration Sister     Amblyopia Sister     Cataracts Paternal Uncle     Heart failure Paternal Uncle     Stroke Paternal Uncle     Heart failure Paternal Grandfather     Heart disease Brother 45    Crohn's disease Brother     Heart attack Paternal Grandmother     Blindness Neg Hx     Cancer Neg Hx     Glaucoma Neg Hx     Retinal detachment Neg Hx     Strabismus Neg Hx      Thyroid disease Neg Hx     Colon cancer Neg Hx      Social History     Socioeconomic History    Marital status:     Number of children: 0   Occupational History    Occupation: Medical Billing     Employer: [employer]     Comment: [employer]   Tobacco Use    Smoking status: Passive Smoke Exposure - Never Smoker    Smokeless tobacco: Never   Substance and Sexual Activity    Alcohol use: Yes     Alcohol/week: 1.0 standard drink     Types: 1 Standard drinks or equivalent per week     Comment: Socially    Drug use: No    Sexual activity: Yes     Partners: Male   Social History Narrative         Social Determinants of Health     Financial Resource Strain: Low Risk     Difficulty of Paying Living Expenses: Not hard at all   Food Insecurity: No Food Insecurity    Worried About Running Out of Food in the Last Year: Never true    Ran Out of Food in the Last Year: Never true   Transportation Needs: No Transportation Needs    Lack of Transportation (Medical): No    Lack of Transportation (Non-Medical): No   Physical Activity: Sufficiently Active    Days of Exercise per Week: 3 days    Minutes of Exercise per Session: 150+ min   Stress: Stress Concern Present    Feeling of Stress : To some extent   Social Connections: Socially Isolated    Frequency of Communication with Friends and Family: More than three times a week    Frequency of Social Gatherings with Friends and Family: More than three times a week    Attends [employer] Services: Never    Active Member of Clubs or Organizations: No    Attends Club or Organization Meetings: Never    Marital Status:    Housing Stability: Unknown    Unable to Pay for Housing in the Last Year: No    Unstable Housing in the Last Year: No     Medication List with Changes/Refills   Current Medications    ALENDRONATE (FOSAMAX) 70 MG TABLET    Take 1 tablet (70 mg total) by mouth every 7 days.    BUSPIRONE (BUSPAR) 7.5 MG TABLET    Take 1 tablet (7.5 mg  total) by mouth 3 (three) times daily as needed (anxiety).    CHOLECALCIFEROL, VITAMIN D3, (VITAMIN D3) 50 MCG (2,000 UNIT) CAP CAPSULE    Take 1 capsule (2,000 Units total) by mouth once daily.    DICLOFENAC SODIUM (VOLTAREN) 1 % GEL    APPLY 2 GRAMS TOPICALLY TO THE AFFECTED AREA EVERY DAY    IBUPROFEN (ADVIL,MOTRIN) 800 MG TABLET    Take 800 mg by mouth every 6 (six) hours as needed for Pain.    LOVASTATIN (MEVACOR) 40 MG TABLET    TAKE 1 TABLET(40 MG) BY MOUTH EVERY EVENING    MAGNESIUM 250 MG TAB    Take 250 mg by mouth once.    PAROXETINE (PAXIL) 40 MG TABLET    TAKE 1 TABLET(40 MG) BY MOUTH EVERY MORNING    TRAZODONE (DESYREL) 50 MG TABLET    TAKE 1 AND 1/2 TABLETS(75 MG) BY MOUTH EVERY EVENING    VIT A/VIT C/VIT E/ZINC/COPPER (OCUVITE PRESERVISION ORAL)    Take by mouth once daily.    VITAMIN E 400 UNIT CAPSULE    Take 400 Units by mouth once daily.     Review of patient's allergies indicates:   Allergen Reactions    Adhesive Rash    Codeine Nausea And Vomiting    Iodine     Iodine containing multivitamin Nausea Only    Lanolin Hives    Latex, natural rubber Hives    Neosporin [benzalkonium chloride] Hives    Shellfish containing products Nausea And Vomiting    Neosporin (neomycin-polymyx) Hives, Itching and Rash     Review of Systems   Constitutional: Negative for malaise/fatigue.   HENT:  Negative for hearing loss.    Eyes:  Positive for visual disturbance. Negative for double vision.   Cardiovascular:  Positive for chest pain and palpitations.   Respiratory:  Negative for shortness of breath.    Endocrine: Negative for cold intolerance.   Hematologic/Lymphatic: Does not bruise/bleed easily.   Skin:  Negative for poor wound healing and suspicious lesions.   Musculoskeletal:  Positive for back pain and neck pain. Negative for gout, joint pain and joint swelling.   Gastrointestinal:  Positive for heartburn. Negative for nausea and vomiting.   Genitourinary:  Negative for dysuria.   Neurological:  Negative  for numbness, paresthesias and sensory change.   Psychiatric/Behavioral:  Positive for altered mental status and depression. Negative for memory loss and substance abuse. The patient is nervous/anxious.    Allergic/Immunologic: Negative for persistent infections.     Objective:   Body mass index is 34.39 kg/m².  There were no vitals filed for this visit.             General    Constitutional: She is oriented to person, place, and time. She appears well-developed and well-nourished. No distress.   HENT:   Head: Normocephalic.   Mouth/Throat: Oropharynx is clear and moist.   Eyes: EOM are normal.   Cardiovascular:  Normal rate.            Pulmonary/Chest: Effort normal.   Abdominal: Soft.   Neurological: She is alert and oriented to person, place, and time. No cranial nerve deficit.   Psychiatric: She has a normal mood and affect.             Right Hand/Wrist Exam     Inspection   Scars: Wrist - absent Hand -  absent  Effusion: Wrist - absent Hand -  present    Other     Neuorologic Exam    Median Distribution: normal  Ulnar Distribution: normal  Radial Distribution: normal    Comments:  The patient has a 1 cm dorsal radial ganglion cyst of the right index finger metacarpophalangeal joint.  There is pain at the extremes of motion.  There are no motor or sensory deficits.          Vascular Exam       Capillary Refill  Right Hand: normal capillary refill        Relevant imaging results reviewed and interpreted by me, discussed with the patient and / or family today radiographs right hand showed degenerative change in multiple small joints  Assessment:     Encounter Diagnoses   Name Primary?    Right hand pain     Mass of finger of right hand     Ganglion cyst right index finger dorsal metacarpophalangeal joint    Plan:     The patient wishes to have excision ganglion cyst right index finger dorsal metacarpophalangeal joint.  Risk complications and alternatives were discussed including the risk of infection, anesthetic  risk, injury to nerves and vessels, loss of motion, and a 5% recurrence rate was quoted.  She seems to understand and agree that surgery.  All questions were answered.                Disclaimer: This note was prepared using a voice recognition system and is likely to have sound alike errors within the text.

## 2023-03-01 DIAGNOSIS — R22.31 MASS OF FINGER OF RIGHT HAND: Primary | ICD-10-CM

## 2023-03-03 ENCOUNTER — OFFICE VISIT (OUTPATIENT)
Dept: PRIMARY CARE CLINIC | Facility: CLINIC | Age: 68
End: 2023-03-03
Payer: MEDICARE

## 2023-03-03 VITALS
TEMPERATURE: 98 F | BODY MASS INDEX: 34.48 KG/M2 | OXYGEN SATURATION: 95 % | WEIGHT: 182.5 LBS | HEART RATE: 82 BPM | SYSTOLIC BLOOD PRESSURE: 127 MMHG | DIASTOLIC BLOOD PRESSURE: 63 MMHG

## 2023-03-03 DIAGNOSIS — E78.2 MIXED HYPERLIPIDEMIA: Chronic | ICD-10-CM

## 2023-03-03 DIAGNOSIS — R79.9 ABNORMAL BLOOD CHEMISTRY: ICD-10-CM

## 2023-03-03 DIAGNOSIS — Z12.31 ENCOUNTER FOR SCREENING MAMMOGRAM FOR MALIGNANT NEOPLASM OF BREAST: ICD-10-CM

## 2023-03-03 DIAGNOSIS — M54.30 SCIATIC LEG PAIN: Primary | ICD-10-CM

## 2023-03-03 DIAGNOSIS — J30.1 SEASONAL ALLERGIC RHINITIS DUE TO POLLEN: ICD-10-CM

## 2023-03-03 DIAGNOSIS — F41.9 ANXIETY DISORDER, UNSPECIFIED: ICD-10-CM

## 2023-03-03 PROCEDURE — 3074F PR MOST RECENT SYSTOLIC BLOOD PRESSURE < 130 MM HG: ICD-10-PCS | Mod: HCNC,CPTII,S$GLB, | Performed by: INTERNAL MEDICINE

## 2023-03-03 PROCEDURE — 99999 PR PBB SHADOW E&M-EST. PATIENT-LVL IV: CPT | Mod: PBBFAC,HCNC,, | Performed by: INTERNAL MEDICINE

## 2023-03-03 PROCEDURE — 99214 PR OFFICE/OUTPT VISIT, EST, LEVL IV, 30-39 MIN: ICD-10-PCS | Mod: HCNC,S$GLB,, | Performed by: INTERNAL MEDICINE

## 2023-03-03 PROCEDURE — 1125F PR PAIN SEVERITY QUANTIFIED, PAIN PRESENT: ICD-10-PCS | Mod: HCNC,CPTII,S$GLB, | Performed by: INTERNAL MEDICINE

## 2023-03-03 PROCEDURE — 3008F PR BODY MASS INDEX (BMI) DOCUMENTED: ICD-10-PCS | Mod: HCNC,CPTII,S$GLB, | Performed by: INTERNAL MEDICINE

## 2023-03-03 PROCEDURE — 1160F RVW MEDS BY RX/DR IN RCRD: CPT | Mod: HCNC,CPTII,S$GLB, | Performed by: INTERNAL MEDICINE

## 2023-03-03 PROCEDURE — 1101F PR PT FALLS ASSESS DOC 0-1 FALLS W/OUT INJ PAST YR: ICD-10-PCS | Mod: HCNC,CPTII,S$GLB, | Performed by: INTERNAL MEDICINE

## 2023-03-03 PROCEDURE — 1101F PT FALLS ASSESS-DOCD LE1/YR: CPT | Mod: HCNC,CPTII,S$GLB, | Performed by: INTERNAL MEDICINE

## 2023-03-03 PROCEDURE — 1160F PR REVIEW ALL MEDS BY PRESCRIBER/CLIN PHARMACIST DOCUMENTED: ICD-10-PCS | Mod: HCNC,CPTII,S$GLB, | Performed by: INTERNAL MEDICINE

## 2023-03-03 PROCEDURE — 99214 OFFICE O/P EST MOD 30 MIN: CPT | Mod: HCNC,S$GLB,, | Performed by: INTERNAL MEDICINE

## 2023-03-03 PROCEDURE — 3074F SYST BP LT 130 MM HG: CPT | Mod: HCNC,CPTII,S$GLB, | Performed by: INTERNAL MEDICINE

## 2023-03-03 PROCEDURE — 1159F MED LIST DOCD IN RCRD: CPT | Mod: HCNC,CPTII,S$GLB, | Performed by: INTERNAL MEDICINE

## 2023-03-03 PROCEDURE — 1159F PR MEDICATION LIST DOCUMENTED IN MEDICAL RECORD: ICD-10-PCS | Mod: HCNC,CPTII,S$GLB, | Performed by: INTERNAL MEDICINE

## 2023-03-03 PROCEDURE — 1125F AMNT PAIN NOTED PAIN PRSNT: CPT | Mod: HCNC,CPTII,S$GLB, | Performed by: INTERNAL MEDICINE

## 2023-03-03 PROCEDURE — 99999 PR PBB SHADOW E&M-EST. PATIENT-LVL IV: ICD-10-PCS | Mod: PBBFAC,HCNC,, | Performed by: INTERNAL MEDICINE

## 2023-03-03 PROCEDURE — 3078F PR MOST RECENT DIASTOLIC BLOOD PRESSURE < 80 MM HG: ICD-10-PCS | Mod: HCNC,CPTII,S$GLB, | Performed by: INTERNAL MEDICINE

## 2023-03-03 PROCEDURE — 3008F BODY MASS INDEX DOCD: CPT | Mod: HCNC,CPTII,S$GLB, | Performed by: INTERNAL MEDICINE

## 2023-03-03 PROCEDURE — 3078F DIAST BP <80 MM HG: CPT | Mod: HCNC,CPTII,S$GLB, | Performed by: INTERNAL MEDICINE

## 2023-03-03 PROCEDURE — 3288F FALL RISK ASSESSMENT DOCD: CPT | Mod: HCNC,CPTII,S$GLB, | Performed by: INTERNAL MEDICINE

## 2023-03-03 PROCEDURE — 3288F PR FALLS RISK ASSESSMENT DOCUMENTED: ICD-10-PCS | Mod: HCNC,CPTII,S$GLB, | Performed by: INTERNAL MEDICINE

## 2023-03-03 RX ORDER — GABAPENTIN 100 MG/1
100-300 CAPSULE ORAL NIGHTLY PRN
Qty: 90 CAPSULE | Refills: 11 | Status: SHIPPED | OUTPATIENT
Start: 2023-03-03 | End: 2024-03-20

## 2023-03-03 RX ORDER — FLUTICASONE PROPIONATE 50 MCG
1 SPRAY, SUSPENSION (ML) NASAL DAILY
Qty: 15.8 ML | Refills: 3 | Status: SHIPPED | OUTPATIENT
Start: 2023-03-03 | End: 2024-01-30

## 2023-03-03 NOTE — PROGRESS NOTES
Subjective:       Patient ID: Mouna Chandra is a 68 y.o. female.    Chief Complaint: Follow-up (Spider bite on left foot sciatic nerve on left side hurts really bad)    HPI:     Here for follow up of medical problems.  Now taking fosamax, tolerating well.  No falls since last visit. Doing acupuncture for sciatic nerve pain with good result. Dr. Moreno is operating 3/16/23 on right index finger to remove cyst on MCP joint. Shoulder pain has resolved. Uses ibuprofen infrequently. No regular exercise lately; does walk about 10 minutes and bowls 1.5 hours on the weekend.  No f/c/sw.  Woke up sore throat this morning and has a post-nasal drip. No exertional cp/sob/palp.  Paxil working for anxiety; sometimes gets overwhelmed/irritated.    The 10-year ASCVD risk score (Bernarda WILKERSON, et al., 2019) is: 6.6%    Values used to calculate the score:      Age: 67 years      Sex: Female      Is Non- : No      Diabetic: No      Tobacco smoker: No      Systolic Blood Pressure: 136 mmHg      Is BP treated: No      HDL Cholesterol: 52 mg/dL      Total Cholesterol: 172 mg/dL      6/22 BMD:  FINDINGS:  The L1 to L4 vertebral bone mineral density is equal to 1.176 g/cm squared with a T score of -0.1.  There has been no significant change relative to the prior study.     The left femoral neck bone mineral density is equal to 0.862 g/cm squared with a T score of -1.3.  There has been  no significant change relative to the prior study.     There is a 23.7% risk of a major osteoporotic fracture and a 1.6% risk of hip fracture in the next 10 years (FRAX).     Impression:     Osteopenia     Updated/ annual due 5/23:  HM: 12/22 fluvax, 3/17 [id number], 6/21 [id number], 11/16 TDaP, 10/16 zostavax, 6/21 Shingrix x2, 6/22 BMD rep 2y, 1/15 Cscope rep due 10y, 7/22 MMG/me, 2/23 Eye Dr. Tobin, 5/21 HCV neg.    Objective:     Vitals:    03/03/23 1033   BP: 127/63   Pulse: 82   Temp: 97.6 °F (36.4 °C)        Physical  Exam  Vitals and nursing note reviewed.   Constitutional:       Appearance: She is well-developed.   HENT:      Head: Normocephalic and atraumatic.   Eyes:      Pupils: Pupils are equal, round, and reactive to light.   Cardiovascular:      Rate and Rhythm: Normal rate and regular rhythm.   Pulmonary:      Effort: Pulmonary effort is normal.      Breath sounds: Normal breath sounds. No wheezing, rhonchi or rales.   Abdominal:      Palpations: Abdomen is soft.      Tenderness: There is no abdominal tenderness.   Musculoskeletal:         General: No deformity. Normal range of motion.      Cervical back: Normal range of motion and neck supple.   Skin:     General: Skin is warm and dry.   Neurological:      General: No focal deficit present.      Mental Status: She is alert and oriented to person, place, and time.   Psychiatric:         Mood and Affect: Mood normal.         Behavior: Behavior normal.         Thought Content: Thought content normal.        Latest Reference Range & Units 02/09/23 10:00   Sodium 136 - 145 mmol/L 138   Potassium 3.5 - 5.1 mmol/L 4.4   Chloride 95 - 110 mmol/L 105   CO2 23 - 29 mmol/L 23   Anion Gap 8 - 16 mmol/L 10   BUN 8 - 23 mg/dL 22   Creatinine 0.5 - 1.4 mg/dL 0.8   eGFR >60 mL/min/1.73 m^2 >60   Glucose 70 - 110 mg/dL 92   Calcium 8.7 - 10.5 mg/dL 9.3   Alkaline Phosphatase 55 - 135 U/L 50 (L)   PROTEIN TOTAL 6.0 - 8.4 g/dL 7.2   Albumin 3.5 - 5.2 g/dL 4.1   BILIRUBIN TOTAL 0.1 - 1.0 mg/dL 0.4   AST 10 - 40 U/L 18   ALT 10 - 44 U/L 17   Vit D, 25-Hydroxy 30 - 96 ng/mL 48   (L): Data is abnormally low    Assessment:       1. Sciatic leg pain    2. Seasonal allergic rhinitis due to pollen    3. Encounter for screening mammogram for malignant neoplasm of breast    4. Mixed hyperlipidemia    5. Abnormal blood chemistry    6. Anxiety disorder, unspecified        Plan:           Problem List Items Addressed This Visit          ENT    Allergic rhinitis    Relevant Medications    fluticasone  "propionate (FLONASE) 50 mcg/actuation nasal spray       Cardiac/Vascular    Mixed hyperlipidemia (Chronic)    Relevant Orders    Lipid Panel     Other Visit Diagnoses       Sciatic leg pain    -  Primary    Relevant Medications    gabapentin (NEURONTIN) 100 MG capsule    Encounter for screening mammogram for malignant neoplasm of breast        Relevant Orders    Mammo Digital Screening Bilat w/ Ced    Abnormal blood chemistry        Relevant Orders    CBC Auto Differential    Comprehensive Metabolic Panel    TSH    Hemoglobin A1C    Anxiety disorder, unspecified        Relevant Orders    TSH          Gabapentin added PRN for sciatic nerve pain.   MMG and lab at the [facility] in 7/2023  Schedule f/u appt after with Dr. Velez in July after her labs and MMG done  Educated on "slow carb" eating; add turmeric for antiinflammatory effect  "

## 2023-03-03 NOTE — PATIENT INSTRUCTIONS
"Liquid turmeric 2,000 mg daily is a natural anti-inflammatory   Qunol Liquid Turmeric is made with a high-quality turmeric curcumin complex. It contains curcumin and two other curcuminoids which help stabilize the curcumin.    Try "slow carb" eating.   "

## 2023-03-09 ENCOUNTER — HOSPITAL ENCOUNTER (OUTPATIENT)
Dept: PREADMISSION TESTING | Facility: HOSPITAL | Age: 68
Discharge: HOME | End: 2023-03-09
Attending: ORTHOPAEDIC SURGERY
Payer: MEDICARE

## 2023-03-09 VITALS
HEART RATE: 72 BPM | RESPIRATION RATE: 14 BRPM | OXYGEN SATURATION: 95 % | DIASTOLIC BLOOD PRESSURE: 65 MMHG | SYSTOLIC BLOOD PRESSURE: 128 MMHG | TEMPERATURE: 98 F

## 2023-03-09 DIAGNOSIS — Z01.818 PRE-OP TESTING: Primary | ICD-10-CM

## 2023-03-09 DIAGNOSIS — F41.8 MIXED ANXIETY AND DEPRESSIVE DISORDER: ICD-10-CM

## 2023-03-09 DIAGNOSIS — K21.9 GASTROESOPHAGEAL REFLUX DISEASE WITHOUT ESOPHAGITIS: ICD-10-CM

## 2023-03-09 DIAGNOSIS — K44.0 DIAPHRAGMATIC HERNIA WITH OBSTRUCTION, WITHOUT GANGRENE: ICD-10-CM

## 2023-03-09 DIAGNOSIS — H35.30 MACULAR DEGENERATION, UNSPECIFIED LATERALITY, UNSPECIFIED TYPE: ICD-10-CM

## 2023-03-09 DIAGNOSIS — M67.40 GANGLION CYST: ICD-10-CM

## 2023-03-09 LAB
BASOPHILS # BLD AUTO: 0.04 K/UL (ref 0–0.2)
BASOPHILS NFR BLD: 0.7 % (ref 0–1.9)
DIFFERENTIAL METHOD: ABNORMAL
EOSINOPHIL # BLD AUTO: 0.2 K/UL (ref 0–0.5)
EOSINOPHIL NFR BLD: 4 % (ref 0–8)
ERYTHROCYTE [DISTWIDTH] IN BLOOD BY AUTOMATED COUNT: 12.8 % (ref 11.5–14.5)
HCT VFR BLD AUTO: 37.9 % (ref 37–48.5)
HGB BLD-MCNC: 12.5 G/DL (ref 12–16)
IMM GRANULOCYTES # BLD AUTO: 0 K/UL (ref 0–0.04)
IMM GRANULOCYTES NFR BLD AUTO: 0 % (ref 0–0.5)
LYMPHOCYTES # BLD AUTO: 1.7 K/UL (ref 1–4.8)
LYMPHOCYTES NFR BLD: 30.7 % (ref 18–48)
MCH RBC QN AUTO: 29.8 PG (ref 27–31)
MCHC RBC AUTO-ENTMCNC: 33 G/DL (ref 32–36)
MCV RBC AUTO: 91 FL (ref 82–98)
MONOCYTES # BLD AUTO: 0.5 K/UL (ref 0.3–1)
MONOCYTES NFR BLD: 8.6 % (ref 4–15)
NEUTROPHILS # BLD AUTO: 3.1 K/UL (ref 1.8–7.7)
NEUTROPHILS NFR BLD: 56 % (ref 38–73)
NRBC BLD-RTO: 0 /100 WBC
PLATELET # BLD AUTO: 201 K/UL (ref 150–450)
PMV BLD AUTO: 8.9 FL (ref 9.2–12.9)
RBC # BLD AUTO: 4.19 M/UL (ref 4–5.4)
WBC # BLD AUTO: 5.48 K/UL (ref 3.9–12.7)

## 2023-03-09 PROCEDURE — 93005 ELECTROCARDIOGRAM TRACING: CPT | Mod: HCNC

## 2023-03-09 PROCEDURE — 93010 EKG 12-LEAD: ICD-10-PCS | Mod: HCNC,,, | Performed by: INTERNAL MEDICINE

## 2023-03-09 PROCEDURE — 93010 ELECTROCARDIOGRAM REPORT: CPT | Mod: HCNC,,, | Performed by: INTERNAL MEDICINE

## 2023-03-09 PROCEDURE — 85025 COMPLETE CBC W/AUTO DIFF WBC: CPT | Mod: HCNC | Performed by: PHYSICIAN ASSISTANT

## 2023-03-09 NOTE — ASSESSMENT & PLAN NOTE
- sx controlled with nexium OTC - pt unsure of dose - will bring AM of surgery to verify dose  - keep follow up with PCP

## 2023-03-09 NOTE — H&P
Preoperative History and Physical  [facility]                                                                   Chief Complaint: Preoperative evaluation     History of Present Illness:      Mouna Chandra is a 68 y.o. female with PMH of HLD, anxiety, depression, GERD, macular degeneration , cervical pain, LBP with R sciatic nerve pain who presents to the office today for a preoperative consultation at the request of Dr. Moreno  who plans on performing ganglion cyst excision on the Right  on March 16.     Functional Status:      The patient is able to climb a flight of stairs. The patient is able to ambulate  without difficulty. The patient's functional status is not affected by the surgical problem; pain with typing at computer  The patient's functional status is not affected by shortness of breath, chest pain, dyspnea on exertion and fatigue.  Stationary bike 15-20 2 x weekly no CP no Sob ; grocery shopping  , housework no issues   MET score greater than 4    Past Medical History:      Past Medical History:   Diagnosis Date    Anxiety 12/21/2016    Anxiety and depression     Familial juvenile macular degeneration syndrome - Both Eyes 11/12/2013    GERD (gastroesophageal reflux disease)     Hyperlipidemia LDL goal < 100     Lumbar disc disease     Dr. Chandra    Palpitation 12/21/2016    Panic attack 12/21/2016    Vitamin D deficiency         Past Surgical History:      Past Surgical History:   Procedure Laterality Date    COLONOSCOPY      HYSTERECTOMY      INJECTION OF ANESTHETIC AGENT AROUND MEDIAL BRANCH NERVES INNERVATING CERVICAL FACET JOINT Bilateral 04/14/2021    Procedure: Bilateral C3-5 MBB with RN IV sedation;  Surgeon: Steve Gutierrez MD;  Location: [facility];  Service: Pain Management;  Laterality: Bilateral;    SELECTIVE INJECTION OF ANESTHETIC AGENT AROUND LUMBAR SPINAL NERVE ROOT BY TRANSFORAMINAL APPROACH Right 02/25/2021     Procedure: BLOCK, SPINAL NERVE ROOT, LUMBAR, SELECTIVE, TRANSFORAMINAL APPROACH Right L4-5, l5-S1 RN IV sedation;  Surgeon: Steve Gutierrez MD;  Location: [facility];  Service: Pain Management;  Laterality: Right;    TOTAL ABDOMINAL HYSTERECTOMY W/ BILATERAL SALPINGOOPHORECTOMY  2002        Social History:      Social History     Socioeconomic History    Marital status:     Number of children: 0   Occupational History    Occupation: Medical Billing     Employer: [employer]     Comment: [employer]   Tobacco Use    Smoking status: Never     Passive exposure: Yes    Smokeless tobacco: Never   Substance and Sexual Activity    Alcohol use: Yes     Alcohol/week: 2.0 standard drinks     Types: 2 Cans of beer per week     Comment: beer 2 nightly    Drug use: Yes     Types: Marijuana     Comment: few x week    Sexual activity: Yes     Partners: Male   Social History Narrative         Social Determinants of Health     Financial Resource Strain: Low Risk     Difficulty of Paying Living Expenses: Not hard at all   Food Insecurity: No Food Insecurity    Worried About Running Out of Food in the Last Year: Never true    Ran Out of Food in the Last Year: Never true   Transportation Needs: No Transportation Needs    Lack of Transportation (Medical): No    Lack of Transportation (Non-Medical): No   Physical Activity: Sufficiently Active    Days of Exercise per Week: 3 days    Minutes of Exercise per Session: 150+ min   Stress: Stress Concern Present    Feeling of Stress : To some extent   Social Connections: Socially Isolated    Frequency of Communication with Friends and Family: More than three times a week    Frequency of Social Gatherings with Friends and Family: More than three times a week    Attends Congregation Services: Never    Active Member of Clubs or Organizations: No    Attends Club or Organization Meetings: Never    Marital Status:    Housing Stability: Unknown    Unable to Pay  for Housing in the Last Year: No    Unstable Housing in the Last Year: No        Family History:      Family History   Problem Relation Age of Onset    Diabetes Mother     Hypertension Mother     Heart failure Father     Heart attack Father     Amblyopia Sister     Macular degeneration Sister     Blindness Sister     Chronic back pain Sister     Heart disease Brother 45    Crohn's disease Brother     Coronary artery disease Brother     Heart attack Paternal Grandmother     Heart failure Paternal Grandfather     Cataracts Paternal Uncle     Heart failure Paternal Uncle     Stroke Paternal Uncle     Cancer Neg Hx     Glaucoma Neg Hx     Retinal detachment Neg Hx     Strabismus Neg Hx     Thyroid disease Neg Hx     Colon cancer Neg Hx        Allergies:      Review of patient's allergies indicates:   Allergen Reactions    Adhesive Rash    Codeine Nausea And Vomiting    Iodine     Iodine containing multivitamin Nausea Only    Lanolin Hives    Latex, natural rubber Hives    Neosporin [benzalkonium chloride] Hives    Shellfish containing products Nausea And Vomiting    Neosporin (neomycin-polymyx) Hives, Itching and Rash       Medications:      Current Outpatient Medications   Medication Sig    alendronate (FOSAMAX) 70 MG tablet Take 1 tablet (70 mg total) by mouth every 7 days.    busPIRone (BUSPAR) 7.5 MG tablet Take 1 tablet (7.5 mg total) by mouth 3 (three) times daily as needed (anxiety).    cholecalciferol, vitamin D3, (VITAMIN D3) 50 mcg (2,000 unit) Cap capsule Take 1 capsule (2,000 Units total) by mouth once daily.    diclofenac sodium (VOLTAREN) 1 % Gel APPLY 2 GRAMS TOPICALLY TO THE AFFECTED AREA EVERY DAY    fluticasone propionate (FLONASE) 50 mcg/actuation nasal spray 1 spray (50 mcg total) by Each Nostril route once daily.    gabapentin (NEURONTIN) 100 MG capsule Take 1-3 capsules (100-300 mg total) by mouth nightly as needed (nerve pain).    ibuprofen (ADVIL,MOTRIN) 800 MG tablet Take 800 mg by mouth every 6  (six) hours as needed for Pain.    Lactobacillus rhamnosus GG (CULTURELLE) 10 billion cell capsule Take 1 capsule by mouth once daily.    lovastatin (MEVACOR) 40 MG tablet TAKE 1 TABLET(40 MG) BY MOUTH EVERY EVENING    paroxetine (PAXIL) 40 MG tablet TAKE 1 TABLET(40 MG) BY MOUTH EVERY MORNING    traZODone (DESYREL) 50 MG tablet TAKE 1 AND 1/2 TABLETS(75 MG) BY MOUTH EVERY EVENING    vit A/vit C/vit E/zinc/copper (OCUVITE PRESERVISION ORAL) Take by mouth once daily.    vitamin E 400 UNIT capsule Take 400 Units by mouth once daily.    magnesium 250 mg Tab Take 250 mg by mouth once.     No current facility-administered medications for this encounter.       Vitals:      Vitals:    03/09/23 1313   BP: 128/65   Pulse: 72   Resp: 14   Temp: 97.9 °F (36.6 °C)       Review of Systems:        Constitutional: Negative for fever, chills, weight loss,  and diaphoresis. + fatigue   HENT: Negative for hearing loss, ear pain, nosebleeds, , sore throat, neck pain, tinnitus and ear discharge.  Nasal congestion relieved with nasal spray   Eyes: Negative for blurred vision, double vision, photophobia, pain, discharge and redness. Decreased vision R eye floater OS   Respiratory: Negative for , hemoptysis, sputum production, shortness of breath, and stridor.  + snoring ; pt reprots occ wheezing with allergy flares - no inhalers ; cough - dry after lunch   Cardiovascular: Negative for chest pain, palpitations, orthopnea, claudication, leg swelling and PND.   Gastrointestinal: Negative for , nausea, vomiting, abdominal pain, diarrhea, constipation, blood in stool and melena. Heartburn controlled with meds ; abdominal spasms no meds in while ( bentyl)   Genitourinary: Negative for dysuria, urgency, frequency, hematuria and flank pain.   Musculoskeletal: Negative for myalgias,  joint pain and falls. Back pain, neck pain, R hand pain   Skin: Negative for itching and rash.   Neurological: Negative for , , tremors, sensory change, speech  change, focal weakness, seizures, loss of consciousness, weakness and headaches. Tingling B hands and R LE lateral thigh to toes ; dizziness / vertigo post COIVD 2020 no meds rarely occurs 1x q 6 months ; numbness to B UE when lifting arms above head   Endo/Heme/Allergies: positive  for environmental allergies Psychiatric/Behavioral: positive  for depression and anxiety .  All 14 systems reviewed and negative except as noted above.    Physical Exam:      Constitutional: Appears well-developed, well-nourished and in no acute distress.  Patient is oriented to person, place, and time.   Head: Normocephalic and atraumatic. Mucous membranes moist.  Neck: Neck supple no mass.   Cardiovascular: Normal rate and regular rhythm.  S1 S2 appreciated by ascultation.  Pulmonary/Chest: Effort normal and clear to auscultation bilaterally. No respiratory distress. No wheezes   Abdomen: Soft. Non-tender and non-distended.  Neurological: Patient is alert and oriented to person, place and time. Moves all extremities.  Skin: Warm and dry. No lesions.  Extremities: No clubbing, cyanosis or edema.    Laboratory data:      Reviewed and noted in plan where applicable. Please see chart for full laboratory data.    No results for input(s): CPK, CPKMB, TROPONINI, MB in the last 24 hours. No results for input(s): POCTGLUCOSE in the last 24 hours.     Lab Results   Component Value Date    INR 1.0 06/07/2015       Lab Results   Component Value Date    WBC 5.99 05/11/2022    HGB 12.7 05/11/2022    HCT 40.0 05/11/2022    MCV 94 05/11/2022     05/11/2022       No results for input(s): GLU, NA, K, CL, CO2, BUN, CREATININE, CALCIUM, MG in the last 24 hours.    Predictors of intubation difficulty:       Morbid obesity? BMI 34   Anatomically abnormal facies? no   Prominent incisors? no   Receding mandible? no   Short, thick neck? no   Neck range of motion: abnormal pain with Rom to R ; mildly decreased ROM to R    Dentition: Missing teeth  (lateral lower and upper left )  Based on the Modified Mallampati, patient is a mallampati score: I (soft palate, uvula, fauces, and tonsillar pillars visible)    Cardiographics:      ECG:  3/9/23  NSR   Echocardiogram:  9/4/2020 stress echo  There were no arrhythmias during stress.  The test was stopped because the patient experienced fatigue and shortness of breath.  There is left ventricular concentric remodeling.  With normal systolic function. The estimated ejection fraction is 60%.  Normal left ventricular diastolic function.  Normal right ventricular systolic function.  The stress echo portion of this study is negative for myocardial ischemia.  The ECG portion of this study is negative for myocardial ischemia.     Imaging:      Chest x-ray: 2021   COMPARISON:  Chest x-ray- 09/21/2020     FINDINGS:  The cardiomediastinal silhouette is normal in appearance.  No pulmonary vascular congestion appreciated. No airspace consolidation or pulmonary mass.  There is linear fibrosis in the left lower lung zone, unchanged.  No significant volume of pleural fluid or pneumothorax. The bones reveal degenerative changes of the lower thoracic spine.     Impression:     No acute cardiopulmonary abnormality appreciated radiographically.  No interval detrimental change in the radiographic appearance of the chest when compared to the previous study.     Cervical x ray 2021   - FINDINGS:  There is slight retrolisthesis of C5 on C6 which appears unchanged between flexion and extension.  There is also slight grade 1 anterolisthesis of C3 on C4 and C4 on C5 which is only seen with flexion.  Vertebral body heights are within normal limits.  There is moderate disc height loss at C5-6 with associated degenerative endplate spurring.  Multilevel facet arthropathy noted.  There is suggestion of bilateral neural foraminal narrowing at C5-6.  No acute fractures visualized.  Odontoid process appears intact.  Atlantoaxial articulations appear  normal.  Prevertebral soft tissues are within normal limits.     Impression:     Degenerative findings with multilevel spondylolisthesis as above  Assessment and Plan:      Ganglion cyst  - pt presents at the request of Dr. Moreno who plans onperforming a ganglion cyst excision on 3/16/23  - Known risk factors for perioperative complications: HLD, anxiety     Difficulty with intubation is not anticipated.    Cardiac Risk Estimation:  Per Revised Cardiac Risk Index patient is a Class I  risk with a 3.9% risk of a major cardiac event.      1.) Preoperative workup as follows: ECG, hemoglobin, hematocrit, electrolytes, creatinine, glucose, liver function studies.  2.) Change in medication regimen before surgery: hold NSAIDs 7 days pre op.  3.) Prophylaxis for cardiac events with perioperative beta-blockers: not indicated.  4.) Invasive hemodynamic monitoring perioperatively: not indicated.  5.) Deep vein thrombosis prophylaxis postoperatively: intermittent pneumatic compression boots and regimen to be chosen by surgical team.  6.) Surveillance for postoperative MI with ECG immediately postoperatively and on postoperati ve days 1 and 2 AND troponin levels 24 hours postoperatively and on day 4 or hospital discharge (whichever comes first): not indicated.  7.) Current medications which may produce withdrawal symptoms if withheld perioperatively: none  8.) Other measures: none      GERD (gastroesophageal reflux disease)  - sx controlled with nexium OTC - pt unsure of dose - will bring AM of surgery to verify dose  - keep follow up with PCP       Mixed anxiety and depressive disorder  - controlled on paxil ; continue home meds  - keep follow up with PCP  - no panic attack in 10 years     Allergic rhinitis  - sx currently controlled on nasal spray     Diaphragmatic hernia without mention of obstruction or gangrene  - on nexium   - post prandial cough - recommended smaller portions at a time  - Continue to follow up with PCP        Macular degeneration  - B eyes followed by opthalmology annually Dr. Tobin   - floaters OS   - Continue to follow up with Opthalmology

## 2023-03-09 NOTE — ASSESSMENT & PLAN NOTE
- B eyes followed by opthalmology annually Dr. Tobin   - floaters OS   - Continue to follow up with Opthalmology

## 2023-03-09 NOTE — DISCHARGE INSTRUCTIONS
To confirm, your doctor has instructed you that surgery is scheduled for 3.16.2023.       Pre admit office will call the afternoon prior to surgery between 1PM and 3PM with arrival time.    Surgery will be at [facility],  The address is [address]. [place]  [address].      IMPORTANT INSTRUCTIONS!    Do not eat or drink after 12 midnight, including water.   Do not smoke or use chewing tobacco after 12 midnight  OK to brush teeth, but no gum, candy, or mints!      Take only these medicines with a small swallow of water-morning of surgery.     none         ____ Stop Aspirin, Ibuprofen, Motrin and Aleve at least 5-7 days before surgery, unless otherwise instructed by your doctor, or the nurse.   You MAY use Tylenol/acetaminophen until day of surgery.      ____  If you take diabetic medication, do NOT take morning of surgery unless instructed by Doctor. Metformin must be stopped 24 hrs prior to surgery time.       ____ Stop taking any Fish Oil supplements or Vitamins at least 5 days prior to surgery, unless instructed otherwise by your Doctor.       Please notify MD office if you have an active infection, currently taking antibiotics or received a vaccination within the past 7 days.      Bathing Instructions: The night before surgery and the morning prior to coming to the hospital:    - Shower & rinse your body as usual with anti-bacterial Soap (Dial or Boone 2000)   -Hibiclens (if indicated) use AFTER anti-bacterial soap; 1 packet PM/1 packet in AM on surgical site only   -Do not use hibiclens on your head, face, or genitals.    -Do not wash with anti-bacterial soap after you use the hibiclens.    -Do not shave surgical site 5-7 days prior to surgery.    -Pubic hair 7 days prior to surgery (gyn pt's).      Pediatric patients do not need to use anti-bacterial soap or Hibiclens.             After Bathing:   __ No powder, lotions, creams, or body spray to skin     __No deodorant for any breast  procedure, PORT, or upper arm surgery     __ No makeup, mascara, nail polish or artificial nails        **SURGERY WILL BE CANCELLED IF ARTIFICIAL/NAIL POLISH IS PRESENT!!!**    __ Please remove all piercings and leave all jewelry at home.    **SURGERY WILL BE CANCELLED IF PIERCINGS ARE PRESENT!!!**      __ Dentures, Hearing Aids and Contact Lens need to be removed prior to the start of surgery.      __ Wear clean, loose-fitting clothing. Allow for dressings/bandages/surgical equipment     __ You must have transportation, and they MUST stay the entire time.       [facility] Visitor/Ride Policy:   Only 1 adult allowed (over the age of 18) to accompany you into Pre-op/Recovery Surgery Dept and must stay through the entire length of admission.     Must have a ride home from a responsible adult that you know and trust.      Pediatric Patients are allowed 2 adult visitors.     Medical Transport, Uber or Lyft can only be used if patient has a responsible adult to accompany them during ride home.         Post-Op Instructions: You will receive surgery post-op instructions by your Discharge Nurse prior to going home.     Surgical Site Infection:   Prevention of surgical site infections:   -Keep incisions clean and dry.   -Do not soak/submerge incisions in water until completely healed.   -Do not apply lotions, powders, creams, or deodorants to site.   -Always make sure hands are cleaned with antibacterial soap/ alcohol-based   prior to touching the surgical site.       Signs and symptoms:               -Redness and pain around the area where you had surgery               -Drainage of cloudy fluid from your surgical wound               -Fever over 100.4 or chills     >>>Call Surgeon office/on-call Surgeon if you experience any of these signs & symptoms post-surgery @ [phone].      *Please Call [facility] Pre-Admit Department for surgery instruction questions:  [phone]    *Payment  questions:  [phone]    *Billing questions:  [phone]

## 2023-03-09 NOTE — ASSESSMENT & PLAN NOTE
- controlled on paxil ; continue home meds  - keep follow up with PCP  - no panic attack in 10 years

## 2023-03-09 NOTE — ASSESSMENT & PLAN NOTE
- pt presents at the request of Dr. Moreno who plans onperforming a ganglion cyst excision on 3/16/23  - Known risk factors for perioperative complications: HLD, anxiety     Difficulty with intubation is not anticipated.    Cardiac Risk Estimation:  Per Revised Cardiac Risk Index patient is a Class I  risk with a 3.9% risk of a major cardiac event.      1.) Preoperative workup as follows: ECG, hemoglobin, hematocrit, electrolytes, creatinine, glucose, liver function studies.  2.) Change in medication regimen before surgery: hold NSAIDs 7 days pre op.  3.) Prophylaxis for cardiac events with perioperative beta-blockers: not indicated.  4.) Invasive hemodynamic monitoring perioperatively: not indicated.  5.) Deep vein thrombosis prophylaxis postoperatively: intermittent pneumatic compression boots and regimen to be chosen by surgical team.  6.) Surveillance for postoperative MI with ECG immediately postoperatively and on postoperati ve days 1 and 2 AND troponin levels 24 hours postoperatively and on day 4 or hospital discharge (whichever comes first): not indicated.  7.) Current medications which may produce withdrawal symptoms if withheld perioperatively: none  8.) Other measures: none

## 2023-03-09 NOTE — ASSESSMENT & PLAN NOTE
- on nexium   - post prandial cough - recommended smaller portions at a time  - Continue to follow up with PCP

## 2023-03-09 NOTE — PROGRESS NOTES
To confirm, your doctor has instructed you that surgery is scheduled for 3.16.2023.       Pre admit office will call the afternoon prior to surgery between 1PM and 3PM with arrival time.    Surgery will be at [facility],  The address is [address]. [place]  [address].      IMPORTANT INSTRUCTIONS!    Do not eat or drink after 12 midnight, including water.   Do not smoke or use chewing tobacco after 12 midnight  OK to brush teeth, but no gum, candy, or mints!      Take only these medicines with a small swallow of water-morning of surgery.     none         ____ Stop Aspirin, Ibuprofen, Motrin and Aleve at least 5-7 days before surgery, unless otherwise instructed by your doctor, or the nurse.   You MAY use Tylenol/acetaminophen until day of surgery.      ____  If you take diabetic medication, do NOT take morning of surgery unless instructed by Doctor. Metformin must be stopped 24 hrs prior to surgery time.       ____ Stop taking any Fish Oil supplements or Vitamins at least 5 days prior to surgery, unless instructed otherwise by your Doctor.       Please notify MD office if you have an active infection, currently taking antibiotics or received a vaccination within the past 7 days.      Bathing Instructions: The night before surgery and the morning prior to coming to the hospital:    - Shower & rinse your body as usual with anti-bacterial Soap (Dial or Boone 2000)   -Hibiclens (if indicated) use AFTER anti-bacterial soap; 1 packet PM/1 packet in AM on surgical site only   -Do not use hibiclens on your head, face, or genitals.    -Do not wash with anti-bacterial soap after you use the hibiclens.    -Do not shave surgical site 5-7 days prior to surgery.    -Pubic hair 7 days prior to surgery (gyn pt's).      Pediatric patients do not need to use anti-bacterial soap or Hibiclens.             After Bathing:   __ No powder, lotions, creams, or body spray to skin     __No deodorant for any breast  procedure, PORT, or upper arm surgery     __ No makeup, mascara, nail polish or artificial nails        **SURGERY WILL BE CANCELLED IF ARTIFICIAL/NAIL POLISH IS PRESENT!!!**    __ Please remove all piercings and leave all jewelry at home.    **SURGERY WILL BE CANCELLED IF PIERCINGS ARE PRESENT!!!**      __ Dentures, Hearing Aids and Contact Lens need to be removed prior to the start of surgery.      __ Wear clean, loose-fitting clothing. Allow for dressings/bandages/surgical equipment     __ You must have transportation, and they MUST stay the entire time.       [facility] Visitor/Ride Policy:   Only 1 adult allowed (over the age of 18) to accompany you into Pre-op/Recovery Surgery Dept and must stay through the entire length of admission.     Must have a ride home from a responsible adult that you know and trust.      Pediatric Patients are allowed 2 adult visitors.     Medical Transport, Uber or Lyft can only be used if patient has a responsible adult to accompany them during ride home.         Post-Op Instructions: You will receive surgery post-op instructions by your Discharge Nurse prior to going home.     Surgical Site Infection:   Prevention of surgical site infections:   -Keep incisions clean and dry.   -Do not soak/submerge incisions in water until completely healed.   -Do not apply lotions, powders, creams, or deodorants to site.   -Always make sure hands are cleaned with antibacterial soap/ alcohol-based   prior to touching the surgical site.       Signs and symptoms:               -Redness and pain around the area where you had surgery               -Drainage of cloudy fluid from your surgical wound               -Fever over 100.4 or chills     >>>Call Surgeon office/on-call Surgeon if you experience any of these signs & symptoms post-surgery @ [phone].      *Please Call [facility] Pre-Admit Department for surgery instruction questions:  [phone]    *Payment  questions:  [phone]    *Billing questions:  [phone]

## 2023-03-15 ENCOUNTER — TELEPHONE (OUTPATIENT)
Dept: PAIN MEDICINE | Facility: CLINIC | Age: 68
End: 2023-03-15
Payer: MEDICARE

## 2023-03-15 ENCOUNTER — TELEPHONE (OUTPATIENT)
Dept: PREADMISSION TESTING | Facility: HOSPITAL | Age: 68
End: 2023-03-15
Payer: MEDICARE

## 2023-03-15 ENCOUNTER — ANESTHESIA EVENT (OUTPATIENT)
Dept: SURGERY | Facility: HOSPITAL | Age: 68
End: 2023-03-15
Payer: MEDICARE

## 2023-03-15 NOTE — TELEPHONE ENCOUNTER
Called and spoke with the patient about the following:     Your Surgery arrival time is at 5:30AM on 3/16/23 at [facility] location.   The address is [address] [facility] [address] [place]  [address].      Only one adult (over 18) is to accompany you to surgery, unless it is a Pediatric patient, then 2 adults are encouraged to accompany them to the surgery center.     Your ride MUST STAY the entire time until you are discharged.      Please come to the main lobby and be prepared to show your photo ID and insurance card.      Nothing to eat or drink after midnight, unless you were instructed to take specific medications discussed with the Pre-admit Nurse.      Please call with any questions or concerns.     [phone]      Thanks.

## 2023-03-16 ENCOUNTER — HOSPITAL ENCOUNTER (OUTPATIENT)
Facility: HOSPITAL | Age: 68
Discharge: HOME | End: 2023-03-16
Attending: ORTHOPAEDIC SURGERY | Admitting: ORTHOPAEDIC SURGERY
Payer: MEDICARE

## 2023-03-16 ENCOUNTER — ANESTHESIA (OUTPATIENT)
Dept: SURGERY | Facility: HOSPITAL | Age: 68
End: 2023-03-16
Payer: MEDICARE

## 2023-03-16 ENCOUNTER — PATIENT MESSAGE (OUTPATIENT)
Dept: SURGERY | Facility: HOSPITAL | Age: 68
End: 2023-03-16
Payer: MEDICARE

## 2023-03-16 VITALS
TEMPERATURE: 98 F | HEART RATE: 79 BPM | DIASTOLIC BLOOD PRESSURE: 74 MMHG | BODY MASS INDEX: 34.44 KG/M2 | HEIGHT: 61 IN | WEIGHT: 182.44 LBS | RESPIRATION RATE: 20 BRPM | OXYGEN SATURATION: 99 % | SYSTOLIC BLOOD PRESSURE: 152 MMHG

## 2023-03-16 DIAGNOSIS — M67.441 GANGLION CYST OF FINGER OF RIGHT HAND: Primary | ICD-10-CM

## 2023-03-16 PROCEDURE — 71000033 HC RECOVERY, INTIAL HOUR: Mod: HCNC | Performed by: ORTHOPAEDIC SURGERY

## 2023-03-16 PROCEDURE — 36000706: Mod: HCNC | Performed by: ORTHOPAEDIC SURGERY

## 2023-03-16 PROCEDURE — D9220A PRA ANESTHESIA: Mod: HCNC,,, | Performed by: NURSE ANESTHETIST, CERTIFIED REGISTERED

## 2023-03-16 PROCEDURE — 25000003 PHARM REV CODE 250: Mod: HCNC | Performed by: NURSE ANESTHETIST, CERTIFIED REGISTERED

## 2023-03-16 PROCEDURE — 25000003 PHARM REV CODE 250: Mod: HCNC | Performed by: ORTHOPAEDIC SURGERY

## 2023-03-16 PROCEDURE — D9220A PRA ANESTHESIA: ICD-10-PCS | Mod: HCNC,,, | Performed by: NURSE ANESTHETIST, CERTIFIED REGISTERED

## 2023-03-16 PROCEDURE — 63600175 PHARM REV CODE 636 W HCPCS: Mod: HCNC | Performed by: NURSE ANESTHETIST, CERTIFIED REGISTERED

## 2023-03-16 PROCEDURE — 88304 TISSUE EXAM BY PATHOLOGIST: CPT | Mod: HCNC | Performed by: STUDENT IN AN ORGANIZED HEALTH CARE EDUCATION/TRAINING PROGRAM

## 2023-03-16 PROCEDURE — 26160 REMOVE TENDON SHEATH LESION: CPT | Mod: HCNC,RT,, | Performed by: ORTHOPAEDIC SURGERY

## 2023-03-16 PROCEDURE — 88304 TISSUE EXAM BY PATHOLOGIST: CPT | Mod: 26,HCNC,, | Performed by: STUDENT IN AN ORGANIZED HEALTH CARE EDUCATION/TRAINING PROGRAM

## 2023-03-16 PROCEDURE — 26160 PR EXCIS TENDON SHEATH LESION, HAND/FINGER: ICD-10-PCS | Mod: HCNC,RT,, | Performed by: ORTHOPAEDIC SURGERY

## 2023-03-16 PROCEDURE — 37000008 HC ANESTHESIA 1ST 15 MINUTES: Mod: HCNC | Performed by: ORTHOPAEDIC SURGERY

## 2023-03-16 PROCEDURE — 37000009 HC ANESTHESIA EA ADD 15 MINS: Mod: HCNC | Performed by: ORTHOPAEDIC SURGERY

## 2023-03-16 PROCEDURE — 71000015 HC POSTOP RECOV 1ST HR: Mod: HCNC | Performed by: ORTHOPAEDIC SURGERY

## 2023-03-16 PROCEDURE — 36000707: Mod: HCNC | Performed by: ORTHOPAEDIC SURGERY

## 2023-03-16 PROCEDURE — 25000003 PHARM REV CODE 250: Mod: HCNC

## 2023-03-16 PROCEDURE — 63600175 PHARM REV CODE 636 W HCPCS: Mod: HCNC | Performed by: ANESTHESIOLOGY

## 2023-03-16 PROCEDURE — 88304 PR  SURG PATH,LEVEL III: ICD-10-PCS | Mod: 26,HCNC,, | Performed by: STUDENT IN AN ORGANIZED HEALTH CARE EDUCATION/TRAINING PROGRAM

## 2023-03-16 PROCEDURE — 88307 TISSUE EXAM BY PATHOLOGIST: CPT | Mod: HCNC | Performed by: STUDENT IN AN ORGANIZED HEALTH CARE EDUCATION/TRAINING PROGRAM

## 2023-03-16 RX ORDER — HYDROCODONE BITARTRATE AND ACETAMINOPHEN 5; 325 MG/1; MG/1
1 TABLET ORAL EVERY 4 HOURS PRN
Status: DISCONTINUED | OUTPATIENT
Start: 2023-03-16 | End: 2023-03-16 | Stop reason: HOSPADM

## 2023-03-16 RX ORDER — ONDANSETRON 2 MG/ML
INJECTION INTRAMUSCULAR; INTRAVENOUS
Status: DISCONTINUED | OUTPATIENT
Start: 2023-03-16 | End: 2023-03-16

## 2023-03-16 RX ORDER — LIDOCAINE HYDROCHLORIDE 20 MG/ML
INJECTION, SOLUTION EPIDURAL; INFILTRATION; INTRACAUDAL; PERINEURAL
Status: DISCONTINUED | OUTPATIENT
Start: 2023-03-16 | End: 2023-03-16 | Stop reason: HOSPADM

## 2023-03-16 RX ORDER — FENTANYL CITRATE 50 UG/ML
INJECTION, SOLUTION INTRAMUSCULAR; INTRAVENOUS
Status: DISCONTINUED | OUTPATIENT
Start: 2023-03-16 | End: 2023-03-16

## 2023-03-16 RX ORDER — CHLORHEXIDINE GLUCONATE ORAL RINSE 1.2 MG/ML
10 SOLUTION DENTAL
Status: DISPENSED | OUTPATIENT
Start: 2023-03-16

## 2023-03-16 RX ORDER — CHLORHEXIDINE GLUCONATE ORAL RINSE 1.2 MG/ML
10 SOLUTION DENTAL 2 TIMES DAILY
Status: DISCONTINUED | OUTPATIENT
Start: 2023-03-16 | End: 2023-03-16 | Stop reason: HOSPADM

## 2023-03-16 RX ORDER — FENTANYL CITRATE 50 UG/ML
25 INJECTION, SOLUTION INTRAMUSCULAR; INTRAVENOUS EVERY 5 MIN PRN
Status: DISCONTINUED | OUTPATIENT
Start: 2023-03-16 | End: 2023-03-16 | Stop reason: HOSPADM

## 2023-03-16 RX ORDER — HYDROCODONE BITARTRATE AND ACETAMINOPHEN 5; 325 MG/1; MG/1
1 TABLET ORAL EVERY 6 HOURS PRN
Qty: 15 TABLET | Refills: 0 | Status: SHIPPED | OUTPATIENT
Start: 2023-03-16 | End: 2023-07-21

## 2023-03-16 RX ORDER — LIDOCAINE HYDROCHLORIDE 20 MG/ML
INJECTION, SOLUTION EPIDURAL; INFILTRATION; INTRACAUDAL; PERINEURAL
Status: DISCONTINUED | OUTPATIENT
Start: 2023-03-16 | End: 2023-03-16

## 2023-03-16 RX ORDER — CEFAZOLIN SODIUM 2 G/50ML
2 SOLUTION INTRAVENOUS
Status: ACTIVE | OUTPATIENT
Start: 2023-03-16

## 2023-03-16 RX ORDER — KETAMINE HYDROCHLORIDE 50 MG/ML
INJECTION, SOLUTION INTRAMUSCULAR; INTRAVENOUS
Status: DISCONTINUED | OUTPATIENT
Start: 2023-03-16 | End: 2023-03-16

## 2023-03-16 RX ORDER — SODIUM CHLORIDE, SODIUM LACTATE, POTASSIUM CHLORIDE, CALCIUM CHLORIDE 600; 310; 30; 20 MG/100ML; MG/100ML; MG/100ML; MG/100ML
INJECTION, SOLUTION INTRAVENOUS CONTINUOUS
Status: DISCONTINUED | OUTPATIENT
Start: 2023-03-16 | End: 2023-03-16 | Stop reason: HOSPADM

## 2023-03-16 RX ORDER — MIDAZOLAM HYDROCHLORIDE 1 MG/ML
INJECTION, SOLUTION INTRAMUSCULAR; INTRAVENOUS
Status: DISCONTINUED | OUTPATIENT
Start: 2023-03-16 | End: 2023-03-16

## 2023-03-16 RX ORDER — ONDANSETRON 2 MG/ML
4 INJECTION INTRAMUSCULAR; INTRAVENOUS ONCE AS NEEDED
Status: DISCONTINUED | OUTPATIENT
Start: 2023-03-16 | End: 2023-03-16 | Stop reason: HOSPADM

## 2023-03-16 RX ORDER — DEXAMETHASONE SODIUM PHOSPHATE 4 MG/ML
INJECTION, SOLUTION INTRA-ARTICULAR; INTRALESIONAL; INTRAMUSCULAR; INTRAVENOUS; SOFT TISSUE
Status: DISCONTINUED | OUTPATIENT
Start: 2023-03-16 | End: 2023-03-16

## 2023-03-16 RX ORDER — SODIUM CHLORIDE 0.9 % (FLUSH) 0.9 %
10 SYRINGE (ML) INJECTION
Status: DISCONTINUED | OUTPATIENT
Start: 2023-03-16 | End: 2023-03-16 | Stop reason: HOSPADM

## 2023-03-16 RX ORDER — LIDOCAINE HYDROCHLORIDE 20 MG/ML
INJECTION, SOLUTION INFILTRATION; PERINEURAL
Status: DISCONTINUED
Start: 2023-03-16 | End: 2023-03-16 | Stop reason: HOSPADM

## 2023-03-16 RX ORDER — PROPOFOL 10 MG/ML
VIAL (ML) INTRAVENOUS
Status: DISCONTINUED | OUTPATIENT
Start: 2023-03-16 | End: 2023-03-16

## 2023-03-16 RX ORDER — DIPHENHYDRAMINE HYDROCHLORIDE 50 MG/ML
25 INJECTION INTRAMUSCULAR; INTRAVENOUS EVERY 6 HOURS PRN
Status: DISCONTINUED | OUTPATIENT
Start: 2023-03-16 | End: 2023-03-16 | Stop reason: HOSPADM

## 2023-03-16 RX ADMIN — SODIUM CHLORIDE, POTASSIUM CHLORIDE, SODIUM LACTATE AND CALCIUM CHLORIDE: 600; 310; 30; 20 INJECTION, SOLUTION INTRAVENOUS at 06:03

## 2023-03-16 RX ADMIN — CHLORHEXIDINE GLUCONATE 10 ML: 1.2 RINSE ORAL at 06:03

## 2023-03-16 RX ADMIN — DEXTROSE 2 G: 50 INJECTION, SOLUTION INTRAVENOUS at 07:03

## 2023-03-16 RX ADMIN — MIDAZOLAM 2 MG: 1 INJECTION INTRAMUSCULAR; INTRAVENOUS at 06:03

## 2023-03-16 RX ADMIN — FENTANYL CITRATE 25 MCG: 50 INJECTION, SOLUTION INTRAMUSCULAR; INTRAVENOUS at 07:03

## 2023-03-16 RX ADMIN — PROPOFOL 50 MG: 10 INJECTION, EMULSION INTRAVENOUS at 07:03

## 2023-03-16 RX ADMIN — HYDROCODONE BITARTRATE AND ACETAMINOPHEN 1 TABLET: 5; 325 TABLET ORAL at 08:03

## 2023-03-16 RX ADMIN — ONDANSETRON 4 MG: 2 INJECTION INTRAMUSCULAR; INTRAVENOUS at 07:03

## 2023-03-16 RX ADMIN — KETAMINE HYDROCHLORIDE 25 MG: 50 INJECTION, SOLUTION INTRAMUSCULAR; INTRAVENOUS at 07:03

## 2023-03-16 RX ADMIN — DEXAMETHASONE SODIUM PHOSPHATE 4 MG: 4 INJECTION, SOLUTION INTRA-ARTICULAR; INTRALESIONAL; INTRAMUSCULAR; INTRAVENOUS; SOFT TISSUE at 07:03

## 2023-03-16 RX ADMIN — LIDOCAINE HYDROCHLORIDE 30 MG: 20 INJECTION, SOLUTION EPIDURAL; INFILTRATION; INTRACAUDAL; PERINEURAL at 07:03

## 2023-03-16 NOTE — OP NOTE
[facility]  General Surgery  Operative Note    SUMMARY     Date of Procedure: 3/16/2023     Procedure: Procedure(s) (LRB):  EXCISION, GANGLION CYST, HAND (Right)   dorsal index finger radial metacarpophalangeal joint    Surgeon(s) and Role:     * Kurt Moreno MD - Primary    Assisting Surgeon: None    Pre-Operative Diagnosis:  Ganglion cyst right hand dorsal radial index metacarpophalangeal joint    Post-Operative Diagnosis:  Ganglion cyst right hand dorsal radial index metacarpal phalangeal joint    Anesthesia:  Local plus sedation    Description:  The patient was taken to the operative room where on 10 cc of 2% plain lidocaine used for dorsal radial aspect of the right index finger metacarpophalangeal joint .  Satisfactory anesthesia having been achieved the right hand was prepped and draped in the usual sterile fashion.  She did receive 2 g of Ancef intravenously preoperatively.  After exsanguination of the extremity a proximal tourniquet was inflated to 250 mmHg.  At this time a longitudinal incision was made over the ganglion cyst on right index finger dorsal radial metacarpophalangeal joint.  The incision was extended using blunt and sharp dissection using 3.5 loupe magnification.  An apparent ganglion cyst was found to be coming from the metacarpophalangeal joint on the radial to the arm extensor mechanism.  The cyst was excised and submitted to pathology for examination.  The cyst was about 1 cm in diameter.  The arm capsule and extensor mechanism was closed using 3-0 Vicryl suture.  Skin was closed using horizontal mattress 4-0 nylon suture.  Xeroform gauze 4x4s and 2 ABD pads overwrapped with a 3 in gauze dressing.  The patient tolerated procedure well was transferred to recovery room in satisfactory condition.    Significant Surgical Tasks Conducted by the Assistant(s), if Applicable:  No assistant    Complications:  None     Estimated Blood Loss (EBL):  5 mL           Implants:  None    Specimens:  Ganglion cyst right index dorsal metacarpophalangeal joint           Condition: Good    Disposition: PACU - hemodynamically stable.    Attestation: I was present and scrubbed for the entire procedure.

## 2023-03-16 NOTE — ANESTHESIA PREPROCEDURE EVALUATION
03/16/2023  Mouna Lio Prateek is a 68 y.o., female.    Past Medical History:   Diagnosis Date    Anxiety 12/21/2016    Anxiety and depression     Familial juvenile macular degeneration syndrome - Both Eyes 11/12/2013    GERD (gastroesophageal reflux disease)     Hyperlipidemia LDL goal < 100     Lumbar disc disease     Dr. Chandra    Palpitation 12/21/2016    Panic attack 12/21/2016    Vitamin D deficiency      Past Surgical History:   Procedure Laterality Date    COLONOSCOPY      HYSTERECTOMY      INJECTION OF ANESTHETIC AGENT AROUND MEDIAL BRANCH NERVES INNERVATING CERVICAL FACET JOINT Bilateral 04/14/2021    Procedure: Bilateral C3-5 MBB with RN IV sedation;  Surgeon: Steve Gutierrez MD;  Location: [facility] PAIN [facility]GT;  Service: Pain Management;  Laterality: Bilateral;    SELECTIVE INJECTION OF ANESTHETIC AGENT AROUND LUMBAR SPINAL NERVE ROOT BY TRANSFORAMINAL APPROACH Right 02/25/2021    Procedure: BLOCK, SPINAL NERVE ROOT, LUMBAR, SELECTIVE, TRANSFORAMINAL APPROACH Right L4-5, l5-S1 RN IV sedation;  Surgeon: Steve Gutierrez MD;  Location: [facility] PAIN [facility]GT;  Service: Pain Management;  Laterality: Right;    TOTAL ABDOMINAL HYSTERECTOMY W/ BILATERAL SALPINGOOPHORECTOMY  2002       Pre-op Assessment    I have reviewed the Patient Summary Reports.     I have reviewed the Nursing Notes. I have reviewed the NPO Status.   I have reviewed the Medications.     Review of Systems  Anesthesia Hx:  No problems with previous Anesthesia  History of prior surgery of interest to airway management or planning: Previous anesthesia: General Denies Family Hx of Anesthesia complications.   Denies Personal Hx of Anesthesia complications.   Social:  Non-Smoker    Hematology/Oncology:  Hematology Normal        EENT/Dental:   Macular degeneration.   Cardiovascular:   hyperlipidemia ECG has been reviewed. W/U 2-3 yrs ago  for palpitations, NEG stress echo with NL EF and benign Holter.   Pulmonary:  Pulmonary Normal    Renal/:  Renal/ Normal     Hepatic/GI:   GERD    Neurological:  Neurology Normal    Psych:   anxiety depression          Physical Exam  General: Alert and Oriented    Airway:  Mallampati: II   Mouth Opening: Normal  TM Distance: Normal  Tongue: Normal  Neck ROM: Normal ROM    Dental:  Intact    Chest/Lungs:  Clear to auscultation, Normal Respiratory Rate    Heart:  Rate: Normal  Rhythm: Regular Rhythm        Anesthesia Plan  Type of Anesthesia, risks & benefits discussed:    Anesthesia Type: MAC  Intra-op Monitoring Plan: Standard ASA Monitors  Post Op Pain Control Plan: multimodal analgesia and IV/PO Opioids PRN  Induction:  IV  Informed Consent: Informed consent signed with the Patient and all parties understand the risks and agree with anesthesia plan.  All questions answered.   ASA Score: 2  Day of Surgery Review of History & Physical: H&P Update referred to the surgeon/provider.    Ready For Surgery From Anesthesia Perspective.     .

## 2023-03-16 NOTE — DISCHARGE SUMMARY
[facility] Services  Discharge Note  Short Stay    Procedure(s) (LRB):  EXCISION, GANGLION CYST, HAND (Right) dorsal radial index metacarpophalangeal joint      OUTCOME: Patient tolerated treatment/procedure well without complication and is now ready for discharge.    DISPOSITION: Home or Self Care    FINAL DIAGNOSIS:  Ganglion cyst dorsal radial right index metacarpophalangeal joint    FOLLOWUP: In clinic    DISCHARGE INSTRUCTIONS:    Discharge Procedure Orders   Diet general     Call MD for:  temperature >100.4     Call MD for:  persistent nausea and vomiting     Call MD for:  severe uncontrolled pain     Call MD for:  difficulty breathing, headache or visual disturbances     Call MD for:  redness, tenderness, or signs of infection (pain, swelling, redness, odor or green/yellow discharge around incision site)     Call MD for:  hives     Call MD for:  persistent dizziness or light-headedness     Call MD for:  extreme fatigue        TIME SPENT ON DISCHARGE:  20 minutes

## 2023-03-16 NOTE — DISCHARGE INSTRUCTIONS
Nozin Instructions  Goal: the goal of Nozin is to reduce the risk of post-procedural infections by bacteria in the nasal cavity. Think of it as hand  for your nose.    How to use:    1. Shake Nozin bottle well    2. Take a cotton swab and apply 4 drops to one tip    3. Insert cotton tip into one nostril, being sure not to go deeper into nose than tip of the swab.    4. Swab nostril 6 times counterclockwise and 6 times clockwise. Make sure to swab the inside front pocket of the nostril.    5. Take swab out and apply 2 drops to the same cotton tip. Repeat steps 3 and 4 in the other nostril.        Do steps 1-5 twice a day for 7 days.          After Hand Surgery  After surgery, the better you take care of yourself--especially your hand--the sooner it will heal. Follow your surgeons instructions. Try not to bump your hand, and dont move or lift anything while youre still wearing bandages, a splint, or a cast.  Care for your hand    Keep your hand elevated above heart level as much as possible for the first several days after surgery. This helps reduce swelling and pain.  To help prevent infection and speed healing, take care not to get your cast or bandages wet.  Keep dressing clean, dry, & intact until your follow up appointment.   Relieve pain as directed  Your surgeon may prescribe pain medicine or suggest you take an anti-inflammatory medicine. You might also be instructed to apply ice (or another cold source) to your hand. If you use ice cubes, put them in a plastic bag and rest it on top of your bandages. Leave the cold source on your hand for as long as its comfortable. Do this several times a day for the first few days after surgery. It may take several minutes before you can feel the cold through the cast or bandages.  Follow up with your surgeon  During a follow-up visit after surgery, your surgeon will check your progress. The stitches, bandages, splint, or cast may be removed. A new cast or  splint may be placed. If your hand has healed enough, your surgeon may prescribe exercises.  Do prescribed hand exercises  Your surgeon may recommend that you do exercises. These may be done under the guidance of a physical or occupational therapist. The exercises strengthen your hand, help you regain flexibility, and restore proper function. Do the exercises as advised.  Call your surgeon if you have...  A fever higher than 100.4°F (38.0°C) taken by mouth  Side effects from your medicine, such as prolonged nausea  A wet or loose dressing, or a dressing that is too tight  Excessive bleeding  Increased, ongoing pain or numbness  Signs of infection (such as drainage, warmth, or redness) at the incision site   Date Last Reviewed: 11/11/2015  © 1974-5234 [employer]. [address] [place] [address], [place] [address]. All rights reserved. This information is not intended as a substitute for professional medical care. Always follow your healthcare professional's instructions.

## 2023-03-16 NOTE — TRANSFER OF CARE
"Anesthesia Transfer of Care Note    Patient: Mouna Chandra    Procedure(s) Performed: Procedure(s) (LRB):  EXCISION, GANGLION CYST, HAND (Right)    Patient location: PACU    Anesthesia Type: general    Transport from OR: Transported from OR on room air with adequate spontaneous ventilation    Post pain: adequate analgesia    Post assessment: no apparent anesthetic complications and tolerated procedure well    Post vital signs: stable    Level of consciousness: awake    Nausea/Vomiting: no nausea/vomiting    Complications: none    Transfer of care protocol was followed      Last vitals:   Visit Vitals  BP (!) 143/72 (BP Location: Left arm, Patient Position: Lying)   Pulse 80   Temp 36.7 °C (98.1 °F) (Temporal)   Resp 14   Ht 5' 1.25" (1.556 m)   Wt 82.7 kg (182 lb 6.9 oz)   SpO2 97%   Breastfeeding No   BMI 34.19 kg/m²     "

## 2023-03-16 NOTE — ANESTHESIA POSTPROCEDURE EVALUATION
Anesthesia Post Evaluation    Patient: Mouna Chandra    Procedure(s) Performed: Procedure(s) (LRB):  EXCISION, GANGLION CYST, HAND (Right)    Final Anesthesia Type: general      Patient location during evaluation: PACU  Patient participation: Yes- Able to Participate  Level of consciousness: awake and alert and oriented  Post-procedure vital signs: reviewed and stable  Pain management: adequate  Airway patency: patent    PONV status at discharge: No PONV  Anesthetic complications: no      Cardiovascular status: blood pressure returned to baseline, stable and hemodynamically stable  Respiratory status: unassisted  Hydration status: euvolemic  Follow-up not needed.          Vitals Value Taken Time   /74 03/16/23 0816   Temp 36.7 °C (98.1 °F) 03/16/23 0732   Pulse 78 03/16/23 0821   Resp 27 03/16/23 0821   SpO2 100 % 03/16/23 0820   Vitals shown include unvalidated device data.      Event Time   Out of Recovery 08:15:00         Pain/Felisha Score: Pain Rating Prior to Med Admin: 4 (3/16/2023  8:10 AM)  Felisha Score: 10 (3/16/2023  8:15 AM)

## 2023-03-21 ENCOUNTER — OFFICE VISIT (OUTPATIENT)
Dept: ORTHOPEDICS | Facility: CLINIC | Age: 68
End: 2023-03-21
Payer: MEDICARE

## 2023-03-21 VITALS — BODY MASS INDEX: 34.44 KG/M2 | WEIGHT: 182.44 LBS | HEIGHT: 61 IN

## 2023-03-21 DIAGNOSIS — Z98.890 S/P EXCISION OF GANGLION CYST: Primary | ICD-10-CM

## 2023-03-21 LAB
FINAL PATHOLOGIC DIAGNOSIS: NORMAL
Lab: NORMAL

## 2023-03-21 PROCEDURE — 1159F PR MEDICATION LIST DOCUMENTED IN MEDICAL RECORD: ICD-10-PCS | Mod: HCNC,CPTII,S$GLB,

## 2023-03-21 PROCEDURE — 3288F PR FALLS RISK ASSESSMENT DOCUMENTED: ICD-10-PCS | Mod: HCNC,CPTII,S$GLB,

## 2023-03-21 PROCEDURE — 1125F PR PAIN SEVERITY QUANTIFIED, PAIN PRESENT: ICD-10-PCS | Mod: HCNC,CPTII,S$GLB,

## 2023-03-21 PROCEDURE — 1157F ADVNC CARE PLAN IN RCRD: CPT | Mod: HCNC,CPTII,S$GLB,

## 2023-03-21 PROCEDURE — 1101F PT FALLS ASSESS-DOCD LE1/YR: CPT | Mod: HCNC,CPTII,S$GLB,

## 2023-03-21 PROCEDURE — 3288F FALL RISK ASSESSMENT DOCD: CPT | Mod: HCNC,CPTII,S$GLB,

## 2023-03-21 PROCEDURE — 3008F BODY MASS INDEX DOCD: CPT | Mod: HCNC,CPTII,S$GLB,

## 2023-03-21 PROCEDURE — 99999 PR PBB SHADOW E&M-EST. PATIENT-LVL III: CPT | Mod: PBBFAC,HCNC,,

## 2023-03-21 PROCEDURE — 3008F PR BODY MASS INDEX (BMI) DOCUMENTED: ICD-10-PCS | Mod: HCNC,CPTII,S$GLB,

## 2023-03-21 PROCEDURE — 99999 PR PBB SHADOW E&M-EST. PATIENT-LVL III: ICD-10-PCS | Mod: PBBFAC,HCNC,,

## 2023-03-21 PROCEDURE — 1157F PR ADVANCE CARE PLAN OR EQUIV PRESENT IN MEDICAL RECORD: ICD-10-PCS | Mod: HCNC,CPTII,S$GLB,

## 2023-03-21 PROCEDURE — 99024 PR POST-OP FOLLOW-UP VISIT: ICD-10-PCS | Mod: HCNC,S$GLB,,

## 2023-03-21 PROCEDURE — 99024 POSTOP FOLLOW-UP VISIT: CPT | Mod: HCNC,S$GLB,,

## 2023-03-21 PROCEDURE — 1101F PR PT FALLS ASSESS DOC 0-1 FALLS W/OUT INJ PAST YR: ICD-10-PCS | Mod: HCNC,CPTII,S$GLB,

## 2023-03-21 PROCEDURE — 1125F AMNT PAIN NOTED PAIN PRSNT: CPT | Mod: HCNC,CPTII,S$GLB,

## 2023-03-21 PROCEDURE — 1159F MED LIST DOCD IN RCRD: CPT | Mod: HCNC,CPTII,S$GLB,

## 2023-03-21 NOTE — PROGRESS NOTES
SUBJECTIVE:      Patient ID: Mouna Chandra is a 68 y.o. female.    HPI: Ms. Chandra is here today for post-operative visit #1.  She is 5 days status post EXCISION, GANGLION CYST, HAND (Right) dorsal index finger radial metacarpophalangeal joint by Dr. Moreno on 3/16/23. She reports that she is doing well.  Pain is 6/10, throbbing in quality.  She is still taking the Norco as directed and alternating with ibuprofen for pain.  She has been compliant with postop instructions and keeping the extremity dry. She denies fever, chills, and sweats since the time of the surgery.     Past Medical History:   Diagnosis Date    Anxiety 12/21/2016    Anxiety and depression     Familial juvenile macular degeneration syndrome - Both Eyes 11/12/2013    GERD (gastroesophageal reflux disease)     Hyperlipidemia LDL goal < 100     Lumbar disc disease     Dr. Chandra    Palpitation 12/21/2016    Panic attack 12/21/2016    Vitamin D deficiency      Past Surgical History:   Procedure Laterality Date    COLONOSCOPY      EXCISION OF GANGLION CYST OF HAND Right 3/16/2023    Procedure: EXCISION, GANGLION CYST, HAND;  Surgeon: Kurt Moreno MD;  Location: [facility] OR;  Service: Orthopedics;  Laterality: Right;  excision ganglion cyst right index finger dorsal metacarpophalangeal joint.    HYSTERECTOMY      INJECTION OF ANESTHETIC AGENT AROUND MEDIAL BRANCH NERVES INNERVATING CERVICAL FACET JOINT Bilateral 04/14/2021    Procedure: Bilateral C3-5 MBB with RN IV sedation;  Surgeon: Steve Gutierrez MD;  Location: [facility] PAIN MGT;  Service: Pain Management;  Laterality: Bilateral;    SELECTIVE INJECTION OF ANESTHETIC AGENT AROUND LUMBAR SPINAL NERVE ROOT BY TRANSFORAMINAL APPROACH Right 02/25/2021    Procedure: BLOCK, SPINAL NERVE ROOT, LUMBAR, SELECTIVE, TRANSFORAMINAL APPROACH Right L4-5, l5-S1 RN IV sedation;  Surgeon: Steve Gutierrez MD;  Location: [facility] PAIN MGT;  Service: Pain Management;  Laterality: Right;    TOTAL ABDOMINAL  HYSTERECTOMY W/ BILATERAL SALPINGOOPHORECTOMY  2002     Family History   Problem Relation Age of Onset    Diabetes Mother     Hypertension Mother     Heart failure Father     Heart attack Father     Amblyopia Sister     Macular degeneration Sister     Blindness Sister     Chronic back pain Sister     Heart disease Brother 45    Crohn's disease Brother     Coronary artery disease Brother     Heart attack Paternal Grandmother     Heart failure Paternal Grandfather     Cataracts Paternal Uncle     Heart failure Paternal Uncle     Stroke Paternal Uncle     Cancer Neg Hx     Glaucoma Neg Hx     Retinal detachment Neg Hx     Strabismus Neg Hx     Thyroid disease Neg Hx     Colon cancer Neg Hx      Social History     Socioeconomic History    Marital status:     Number of children: 0   Occupational History    Occupation: Medical Billing     Employer: [employer]     Comment: [employer]   Tobacco Use    Smoking status: Never     Passive exposure: Yes    Smokeless tobacco: Never   Substance and Sexual Activity    Alcohol use: Yes     Alcohol/week: 2.0 standard drinks     Types: 2 Cans of beer per week     Comment: beer 2 nightly    Drug use: Not Currently     Types: Marijuana     Comment: few x week    Sexual activity: Yes     Partners: Male   Social History Narrative         Social Determinants of Health     Financial Resource Strain: Low Risk     Difficulty of Paying Living Expenses: Not hard at all   Food Insecurity: No Food Insecurity    Worried About Running Out of Food in the Last Year: Never true    Ran Out of Food in the Last Year: Never true   Transportation Needs: No Transportation Needs    Lack of Transportation (Medical): No    Lack of Transportation (Non-Medical): No   Physical Activity: Sufficiently Active    Days of Exercise per Week: 3 days    Minutes of Exercise per Session: 150+ min   Stress: Stress Concern Present    Feeling of Stress : To some extent   Social Connections:  Socially Isolated    Frequency of Communication with Friends and Family: More than three times a week    Frequency of Social Gatherings with Friends and Family: More than three times a week    Attends Worship Services: Never    Active Member of Clubs or Organizations: No    Attends Club or Organization Meetings: Never    Marital Status:    Housing Stability: Unknown    Unable to Pay for Housing in the Last Year: No    Unstable Housing in the Last Year: No     Medication List with Changes/Refills   Current Medications    ALENDRONATE (FOSAMAX) 70 MG TABLET    Take 1 tablet (70 mg total) by mouth every 7 days.    BUSPIRONE (BUSPAR) 7.5 MG TABLET    Take 1 tablet (7.5 mg total) by mouth 3 (three) times daily as needed (anxiety).    CHOLECALCIFEROL, VITAMIN D3, (VITAMIN D3) 50 MCG (2,000 UNIT) CAP CAPSULE    Take 1 capsule (2,000 Units total) by mouth once daily.    DICLOFENAC SODIUM (VOLTAREN) 1 % GEL    APPLY 2 GRAMS TOPICALLY TO THE AFFECTED AREA EVERY DAY    FLUTICASONE PROPIONATE (FLONASE) 50 MCG/ACTUATION NASAL SPRAY    1 spray (50 mcg total) by Each Nostril route once daily.    GABAPENTIN (NEURONTIN) 100 MG CAPSULE    Take 1-3 capsules (100-300 mg total) by mouth nightly as needed (nerve pain).    HYDROCODONE-ACETAMINOPHEN (NORCO) 5-325 MG PER TABLET    Take 1 tablet by mouth every 6 (six) hours as needed for Pain.    IBUPROFEN (ADVIL,MOTRIN) 800 MG TABLET    Take 800 mg by mouth every 6 (six) hours as needed for Pain.    LACTOBACILLUS RHAMNOSUS GG (CULTURELLE) 10 BILLION CELL CAPSULE    Take 1 capsule by mouth once daily.    LOVASTATIN (MEVACOR) 40 MG TABLET    TAKE 1 TABLET(40 MG) BY MOUTH EVERY EVENING    MAGNESIUM 250 MG TAB    Take 250 mg by mouth once.    PAROXETINE (PAXIL) 40 MG TABLET    TAKE 1 TABLET(40 MG) BY MOUTH EVERY MORNING    TRAZODONE (DESYREL) 50 MG TABLET    TAKE 1 AND 1/2 TABLETS(75 MG) BY MOUTH EVERY EVENING    VIT A/VIT C/VIT E/ZINC/COPPER (OCUVITE PRESERVISION ORAL)    Take by mouth  "once daily.    VITAMIN E 400 UNIT CAPSULE    Take 400 Units by mouth once daily.     Review of patient's allergies indicates:   Allergen Reactions    Adhesive Rash    Codeine Nausea And Vomiting    Iodine     Iodine containing multivitamin Nausea Only    Lanolin Hives    Latex, natural rubber Hives    Neosporin [benzalkonium chloride] Hives    Shellfish containing products Nausea And Vomiting    Neosporin (neomycin-polymyx) Hives, Itching and Rash       OBJECTIVE:     Physical exam:    Vitals:    03/21/23 1528   Weight: 82.7 kg (182 lb 6.5 oz)   Height: 5' 1.25" (1.556 m)   PainSc:   6   PainLoc: Hand     Vital signs are stable, patient is afebrile.  Patient is well dressed and well groomed, no acute distress.  Alert and oriented to person, place, and time.    Right UE:  Post op dressing taken down.   Incision is clean, dry, and intact.   There is no erythema or exudate. There is no sign of any infection.   Mild ecchymosis locally  Mild edema to finger  She is NVI.   Sutures in place.   2+ pulses noted.  Cap refill <2 seconds  Motor intact to hand    Final Pathologic Diagnosis RELIAPATH DIAGNOSIS:   SOFT TISSUE, RIGHT HAND, EXCISION:   - Histologic changes consistent with ganglion cyst.   MAGUE BARNEY M.D.        ASSESSMENT         Encounter Diagnosis   Name Primary?    S/P excision of ganglion cyst Yes            5 days status post EXCISION, GANGLION CYST, HAND (Right) dorsal index finger radial metacarpophalangeal joint    PLAN:           Mouna was seen today for post-op evaluation.    Diagnoses and all orders for this visit:    S/P excision of ganglion cyst        - PO instruction reviewed and provided to patient  - The incision was cleaned with hydrogen peroxide and normal saline. A sterile Band-Aid was applied.   - Patient may clean the incision daily as above.   - Patient may use brace as needed for symptomatic relief.    - path results discussed  - Patient should notify the office of any signs or symptoms " of infection including fevers, erythema, purulent drainage, increasing pain.    - Follow up for suture removal     POST OPERATIVE INSTRUCTIONS - Visit #1    BANDAGES/DRESSING/BATHING:  We have removed the dressing, cleaned your incision, and put on a band-aid at your first post op visit today. You may clean the incision daily as we did today. Keep the incision clean and dry. When showering, you may cover the incision with a plastic bag/umbrella bag.   Do not use Neosporin or other topical ointments or creams on the incision. If you are concerned about any redness or drainage of the incisions, please call the office.    SWELLING  Some swelling of your arm, hand and fingers is normal. The swelling can be decreased by  elevating your arm on a few pillows when lying down. Swelling can be further controlled by cold therapy over the surgical site. Flexion/extension of the fingers (opening and closing your hands) will also help to relieve swelling and prevent stiffness.    ACTIVITY  You may use the hand and wrist as tolerated for light activity. You are encouraged to bend the wrist, elbow and fingers as soon as the initial surgical dressing is removed. Avoid lifting, pushing, or pulling any object greater than 5-10 pounds for the first 10-14 days.     BRACE  You may use a brace as needed for additional relief/support.    MEDICATIONS  Take pain medicines exactly as directed.  If the doctor gave you a prescription medicine for pain, take it as prescribed.  If you are not taking a prescription pain medicine, take an over-the-counter medicine such as acetaminophen (Tylenol), ibuprofen (Advil, Motrin), or naproxen (Aleve) as long as you do not have an allergy or medical condition that prevents you from taking them.  Do not take two or more pain medicines at the same time unless the doctor told you to. Many pain medicines have acetaminophen, which is Tylenol. Too much acetaminophen (Tylenol) can be harmful.    FOLLOW -UP  You  should be scheduled for a post-op appointment within the 10-14 days following surgery, at which time we will review your surgery, remove sutures and evaluate incisions, review your post-operative program and answer any of your questions.          [facility]

## 2023-03-28 ENCOUNTER — OFFICE VISIT (OUTPATIENT)
Dept: ORTHOPEDICS | Facility: CLINIC | Age: 68
End: 2023-03-28
Payer: MEDICARE

## 2023-03-28 VITALS — WEIGHT: 182.31 LBS | HEIGHT: 61 IN | BODY MASS INDEX: 34.42 KG/M2

## 2023-03-28 DIAGNOSIS — Z98.890 S/P EXCISION OF GANGLION CYST: Primary | ICD-10-CM

## 2023-03-28 PROCEDURE — 1125F PR PAIN SEVERITY QUANTIFIED, PAIN PRESENT: ICD-10-PCS | Mod: HCNC,CPTII,S$GLB,

## 2023-03-28 PROCEDURE — 1157F ADVNC CARE PLAN IN RCRD: CPT | Mod: HCNC,CPTII,S$GLB,

## 2023-03-28 PROCEDURE — 1101F PR PT FALLS ASSESS DOC 0-1 FALLS W/OUT INJ PAST YR: ICD-10-PCS | Mod: HCNC,CPTII,S$GLB,

## 2023-03-28 PROCEDURE — 1157F PR ADVANCE CARE PLAN OR EQUIV PRESENT IN MEDICAL RECORD: ICD-10-PCS | Mod: HCNC,CPTII,S$GLB,

## 2023-03-28 PROCEDURE — 99999 PR PBB SHADOW E&M-EST. PATIENT-LVL II: CPT | Mod: PBBFAC,HCNC,,

## 2023-03-28 PROCEDURE — 3008F PR BODY MASS INDEX (BMI) DOCUMENTED: ICD-10-PCS | Mod: HCNC,CPTII,S$GLB,

## 2023-03-28 PROCEDURE — 3288F FALL RISK ASSESSMENT DOCD: CPT | Mod: HCNC,CPTII,S$GLB,

## 2023-03-28 PROCEDURE — 99999 PR PBB SHADOW E&M-EST. PATIENT-LVL II: ICD-10-PCS | Mod: PBBFAC,HCNC,,

## 2023-03-28 PROCEDURE — 1101F PT FALLS ASSESS-DOCD LE1/YR: CPT | Mod: HCNC,CPTII,S$GLB,

## 2023-03-28 PROCEDURE — 3008F BODY MASS INDEX DOCD: CPT | Mod: HCNC,CPTII,S$GLB,

## 2023-03-28 PROCEDURE — 1125F AMNT PAIN NOTED PAIN PRSNT: CPT | Mod: HCNC,CPTII,S$GLB,

## 2023-03-28 PROCEDURE — 99024 POSTOP FOLLOW-UP VISIT: CPT | Mod: HCNC,S$GLB,,

## 2023-03-28 PROCEDURE — 3288F PR FALLS RISK ASSESSMENT DOCUMENTED: ICD-10-PCS | Mod: HCNC,CPTII,S$GLB,

## 2023-03-28 PROCEDURE — 99024 PR POST-OP FOLLOW-UP VISIT: ICD-10-PCS | Mod: HCNC,S$GLB,,

## 2023-03-28 NOTE — PROGRESS NOTES
SUBJECTIVE:      Patient ID: Mouna Chandra is a 68 y.o. female.    HPI: Ms. Chandra is here today for post-operative visit #2.  She is 12 days status post EXCISION, GANGLION CYST, HAND (Right) dorsal index finger radial metacarpophalangeal joint by Dr. Moreno on 3/16/23. She reports that she is doing well, moving the hand as tolerated.  Pain is 3/10. Reports mild stiffness to finger. She is not taking pain medication.  She has been compliant with postop instructions and keeping the extremity dry. She denies fever, chills, and sweats since the time of the surgery.     Interval hx 3/21/23: Ms. Chandra is here today for post-operative visit #1.  She is 5 days status post EXCISION, GANGLION CYST, HAND (Right) dorsal index finger radial metacarpophalangeal joint by Dr. Moreno on 3/16/23. She reports that she is doing well.  Pain is 6/10, throbbing in quality.  She is still taking the Norco as directed and alternating with ibuprofen for pain.  She has been compliant with postop instructions and keeping the extremity dry. She denies fever, chills, and sweats since the time of the surgery.     Past Medical History:   Diagnosis Date    Anxiety 12/21/2016    Anxiety and depression     Familial juvenile macular degeneration syndrome - Both Eyes 11/12/2013    GERD (gastroesophageal reflux disease)     Hyperlipidemia LDL goal < 100     Lumbar disc disease     Dr. Chandra    Palpitation 12/21/2016    Panic attack 12/21/2016    Vitamin D deficiency      Past Surgical History:   Procedure Laterality Date    COLONOSCOPY      EXCISION OF GANGLION CYST OF HAND Right 3/16/2023    Procedure: EXCISION, GANGLION CYST, HAND;  Surgeon: Kurt Moreno MD;  Location: [facility];  Service: Orthopedics;  Laterality: Right;  excision ganglion cyst right index finger dorsal metacarpophalangeal joint.    HYSTERECTOMY      INJECTION OF ANESTHETIC AGENT AROUND MEDIAL BRANCH NERVES INNERVATING CERVICAL FACET JOINT Bilateral  04/14/2021    Procedure: Bilateral C3-5 MBB with RN IV sedation;  Surgeon: Steve Gutierrez MD;  Location: [facility];  Service: Pain Management;  Laterality: Bilateral;    SELECTIVE INJECTION OF ANESTHETIC AGENT AROUND LUMBAR SPINAL NERVE ROOT BY TRANSFORAMINAL APPROACH Right 02/25/2021    Procedure: BLOCK, SPINAL NERVE ROOT, LUMBAR, SELECTIVE, TRANSFORAMINAL APPROACH Right L4-5, l5-S1 RN IV sedation;  Surgeon: Steve Gutierrez MD;  Location: [facility];  Service: Pain Management;  Laterality: Right;    TOTAL ABDOMINAL HYSTERECTOMY W/ BILATERAL SALPINGOOPHORECTOMY  2002     Family History   Problem Relation Age of Onset    Diabetes Mother     Hypertension Mother     Heart failure Father     Heart attack Father     Amblyopia Sister     Macular degeneration Sister     Blindness Sister     Chronic back pain Sister     Heart disease Brother 45    Crohn's disease Brother     Coronary artery disease Brother     Heart attack Paternal Grandmother     Heart failure Paternal Grandfather     Cataracts Paternal Uncle     Heart failure Paternal Uncle     Stroke Paternal Uncle     Cancer Neg Hx     Glaucoma Neg Hx     Retinal detachment Neg Hx     Strabismus Neg Hx     Thyroid disease Neg Hx     Colon cancer Neg Hx      Social History     Socioeconomic History    Marital status:     Number of children: 0   Occupational History    Occupation: Medical Billing     Employer: [email or web address]     Comment: [employer]   Tobacco Use    Smoking status: Never     Passive exposure: Yes    Smokeless tobacco: Never   Substance and Sexual Activity    Alcohol use: Yes     Alcohol/week: 2.0 standard drinks     Types: 2 Cans of beer per week     Comment: beer 2 nightly    Drug use: Not Currently     Types: Marijuana     Comment: few x week    Sexual activity: Yes     Partners: Male   Social History Narrative         Social Determinants of Health     Financial Resource Strain: Low Risk     Difficulty of Paying Living  Expenses: Not hard at all   Food Insecurity: No Food Insecurity    Worried About Running Out of Food in the Last Year: Never true    Ran Out of Food in the Last Year: Never true   Transportation Needs: No Transportation Needs    Lack of Transportation (Medical): No    Lack of Transportation (Non-Medical): No   Physical Activity: Sufficiently Active    Days of Exercise per Week: 3 days    Minutes of Exercise per Session: 150+ min   Stress: Stress Concern Present    Feeling of Stress : To some extent   Social Connections: Socially Isolated    Frequency of Communication with Friends and Family: More than three times a week    Frequency of Social Gatherings with Friends and Family: More than three times a week    Attends [employer] Services: Never    Active Member of Clubs or Organizations: No    Attends Club or Organization Meetings: Never    Marital Status:    Housing Stability: Unknown    Unable to Pay for Housing in the Last Year: No    Unstable Housing in the Last Year: No     Medication List with Changes/Refills   Current Medications    ALENDRONATE (FOSAMAX) 70 MG TABLET    Take 1 tablet (70 mg total) by mouth every 7 days.    BUSPIRONE (BUSPAR) 7.5 MG TABLET    Take 1 tablet (7.5 mg total) by mouth 3 (three) times daily as needed (anxiety).    CHOLECALCIFEROL, VITAMIN D3, (VITAMIN D3) 50 MCG (2,000 UNIT) CAP CAPSULE    Take 1 capsule (2,000 Units total) by mouth once daily.    DICLOFENAC SODIUM (VOLTAREN) 1 % GEL    APPLY 2 GRAMS TOPICALLY TO THE AFFECTED AREA EVERY DAY    FLUTICASONE PROPIONATE (FLONASE) 50 MCG/ACTUATION NASAL SPRAY    1 spray (50 mcg total) by Each Nostril route once daily.    GABAPENTIN (NEURONTIN) 100 MG CAPSULE    Take 1-3 capsules (100-300 mg total) by mouth nightly as needed (nerve pain).    HYDROCODONE-ACETAMINOPHEN (NORCO) 5-325 MG PER TABLET    Take 1 tablet by mouth every 6 (six) hours as needed for Pain.    IBUPROFEN (ADVIL,MOTRIN) 800 MG TABLET    Take 800 mg by mouth every 6  "(six) hours as needed for Pain.    LACTOBACILLUS RHAMNOSUS GG (CULTURELLE) 10 BILLION CELL CAPSULE    Take 1 capsule by mouth once daily.    LOVASTATIN (MEVACOR) 40 MG TABLET    TAKE 1 TABLET(40 MG) BY MOUTH EVERY EVENING    MAGNESIUM 250 MG TAB    Take 250 mg by mouth once.    PAROXETINE (PAXIL) 40 MG TABLET    TAKE 1 TABLET(40 MG) BY MOUTH EVERY MORNING    TRAZODONE (DESYREL) 50 MG TABLET    TAKE 1 AND 1/2 TABLETS(75 MG) BY MOUTH EVERY EVENING    VIT A/VIT C/VIT E/ZINC/COPPER (OCUVITE PRESERVISION ORAL)    Take by mouth once daily.    VITAMIN E 400 UNIT CAPSULE    Take 400 Units by mouth once daily.     Review of patient's allergies indicates:   Allergen Reactions    Adhesive Rash    Codeine Nausea And Vomiting    Iodine     Iodine containing multivitamin Nausea Only    Lanolin Hives    Latex, natural rubber Hives    Neosporin [benzalkonium chloride] Hives    Shellfish containing products Nausea And Vomiting    Neosporin (neomycin-polymyx) Hives, Itching and Rash       OBJECTIVE:     Physical exam:    Vitals:    03/28/23 1537   Weight: 82.7 kg (182 lb 5.1 oz)   Height: 5' 1.25" (1.556 m)   PainSc:   3   PainLoc: Hand     Vital signs are stable, patient is afebrile.  Patient is well dressed and well groomed, no acute distress.  Alert and oriented to person, place, and time.    Right UE:  Incision is clean, dry, and intact.   There is no erythema or exudate. There is no sign of any infection.   She is NVI.   Sutures in place.   2+ pulses noted.  Cap refill <2 seconds  Motor intact to hand    Final Pathologic Diagnosis  3/16/23 RELIAPATH DIAGNOSIS:   SOFT TISSUE, RIGHT HAND, EXCISION:   - Histologic changes consistent with ganglion cyst.   MAGUE BARNEY M.D.        ASSESSMENT         Encounter Diagnosis   Name Primary?    S/P excision of ganglion cyst Yes          12 days status post EXCISION, GANGLION CYST, HAND (Right) dorsal index finger radial metacarpophalangeal joint    PLAN:           Mouna was seen today for " post-op evaluation.    Diagnoses and all orders for this visit:    S/P excision of ganglion cyst      - PO instruction reviewed and provided to patient  - The incision was cleaned with hydrogen peroxide. Sutures removed with no difficulty. Steri-Strips applied.   - Patient may use brace as needed for symptomatic relief.    - Discussed gentle ROM and using hand to tolerance  - Patient should notify the office of any signs or symptoms of infection including fevers, erythema, purulent drainage, increasing pain.    - Follow up PRN     POST OPERATIVE VISIT INSTRUCTIONS - Visit #2    1. No soaking the incision for at least 7-10 days. You may get it wet in the shower and use regular soap.    2. To avoid a hard, painful scar, we recommend you use Mederma and/or Silicone Scar patches. You may also use Cocoa Butter, Vitamin E oil, Coconut oil, etc.    You may start this 5-7 days after stitches are removed and when wound is completely closed.    - Mederma scar cream: scar massage over incision 1-2 times per day. You may use topical lotion or Vitamin E oil. Massage an additional few times a day to help the scar become soft and less sensitive.    - Silicone Scar Patches: cut and place over the incision, then massage over the patch to help with scarring and sensitivity. Can be reused up to 10 days if you rinse it clean, let it dry out, then reapply.    Brands: Mepiform Silicone Scar Sheets (recommended), Scar Away, [employer] brand or generic pharmacy brand ([facility] [employer], [facility], [facility])    3. Continue range of motion exercises as instructed at todays visit.    4. You may take Tylenol 500mg and/or Ibuprofen 400mg every 4-6 hours with food for pain as tolerated as long as you do not have an allergy or medical condition that prevents you from taking them.    5. Therapy recommended: none at this time    6. Immobilization: brace as needed    7. Lifting restrictions: start light at about 10 lb and increase gradually as tolerated    8. Follow  up: PRN Randi Seneca, PA-C [facility]

## 2023-04-11 ENCOUNTER — PATIENT MESSAGE (OUTPATIENT)
Dept: RHEUMATOLOGY | Facility: CLINIC | Age: 68
End: 2023-04-11
Payer: MEDICARE

## 2023-04-28 ENCOUNTER — PATIENT MESSAGE (OUTPATIENT)
Dept: ORTHOPEDICS | Facility: CLINIC | Age: 68
End: 2023-04-28
Payer: MEDICARE

## 2023-07-07 NOTE — PROGRESS NOTES
"Subjective:      Patient ID: Mouna Chandra is a 68 y.o. female.    Chief Complaint: 4 mth f/u       HPI  Here for f/u medical problems and preventive exam.  Active in yard daily.  Energy ok.  No f/c/sw/cough.  No cp/sob/palp.  BMs normal.  Urine normal.    Recent increased stressors related to boyfriend.  Brother CABGin 40s.  Dad CVA 68yo.  Mult aunts and uncles with heart problems.  Taking fosamax.      The 10-year ASCVD risk score (Bernarda WILKERSON, et al., 2019) is: 8%    Values used to calculate the score:      Age: 68 years      Sex: Female      Is Non- : No      Diabetic: No      Tobacco smoker: No      Systolic Blood Pressure: 139 mmHg      Is BP treated: No      HDL Cholesterol: 55 mg/dL      Total Cholesterol: 198 mg/dL        HM: 12/22 fluvax, 3/17 [id number], 6/21 [id number], 11/16 TDaP, 10/16 zostavax, 6/21 Shingrix x2, 6/22 BMD rep 2y, 1/15 Cscope rep due 10y, 7/23 MMG, 1/18 Eye Dr. Tobin, 5/21 HCV neg.       Review of Systems   Constitutional:  Negative for appetite change, chills, diaphoresis and fever.   HENT:  Negative for congestion, ear pain, rhinorrhea, sinus pressure and sore throat.    Respiratory:  Negative for cough, chest tightness and shortness of breath.    Cardiovascular:  Negative for chest pain and palpitations.   Gastrointestinal:  Negative for blood in stool, constipation, diarrhea, nausea and vomiting.   Genitourinary:  Negative for dysuria, frequency, hematuria, menstrual problem, urgency and vaginal discharge.   Musculoskeletal:  Negative for arthralgias.   Skin:  Negative for rash.   Neurological:  Negative for dizziness and headaches.   Psychiatric/Behavioral:  Negative for sleep disturbance. The patient is not nervous/anxious.        Objective:   /65   Pulse 70   Temp 98.1 °F (36.7 °C) (Oral)   Ht 5' 1" (1.549 m)   Wt 82.4 kg (181 lb 9.6 oz)   SpO2 97%   BMI 34.31 kg/m²     Physical Exam  Constitutional:       Appearance: She is " well-developed.   HENT:      Right Ear: External ear normal. Tympanic membrane is not injected.      Left Ear: External ear normal. Tympanic membrane is not injected.   Eyes:      Conjunctiva/sclera: Conjunctivae normal.   Neck:      Thyroid: No thyromegaly.   Cardiovascular:      Rate and Rhythm: Normal rate and regular rhythm.      Heart sounds: No murmur heard.    No friction rub. No gallop.   Pulmonary:      Effort: Pulmonary effort is normal.      Breath sounds: Normal breath sounds. No wheezing or rales.   Abdominal:      General: Bowel sounds are normal.      Palpations: Abdomen is soft. There is no mass.      Tenderness: There is no abdominal tenderness.   Musculoskeletal:      Cervical back: Normal range of motion and neck supple.   Lymphadenopathy:      Cervical: No cervical adenopathy.   Skin:     General: Skin is warm.      Findings: No rash.   Neurological:      Mental Status: She is alert and oriented to person, place, and time.        Latest Reference Range & Units 07/17/23 08:01   WBC 3.90 - 12.70 K/uL 7.50   RBC 4.00 - 5.40 M/uL 4.29   Hemoglobin 12.0 - 16.0 g/dL 13.3   Hematocrit 37.0 - 48.5 % 41.2   MCV 82 - 98 fL 96   MCH 27.0 - 31.0 pg 31.0   MCHC 32.0 - 36.0 g/dL 32.3   RDW 11.5 - 14.5 % 13.2   Platelets 150 - 450 K/uL 244   MPV 9.2 - 12.9 fL 9.4   Gran % 38.0 - 73.0 % 58.8   Lymph % 18.0 - 48.0 % 29.3   Mono % 4.0 - 15.0 % 7.5   Eosinophil % 0.0 - 8.0 % 3.3   Basophil % 0.0 - 1.9 % 0.8   Immature Granulocytes 0.0 - 0.5 % 0.3   Gran # (ANC) 1.8 - 7.7 K/uL 4.4   Lymph # 1.0 - 4.8 K/uL 2.2   Mono # 0.3 - 1.0 K/uL 0.6   Eos # 0.0 - 0.5 K/uL 0.3   Baso # 0.00 - 0.20 K/uL 0.06   Immature Grans (Abs) 0.00 - 0.04 K/uL 0.02   nRBC 0 /100 WBC 0   Differential Method  Automated   Sodium 136 - 145 mmol/L 141   Potassium 3.5 - 5.1 mmol/L 4.1   Chloride 95 - 110 mmol/L 105   CO2 23 - 29 mmol/L 24   Anion Gap 8 - 16 mmol/L 12   BUN 8 - 23 mg/dL 18   Creatinine 0.5 - 1.4 mg/dL 0.8   eGFR >60 mL/min/1.73 m^2  >60.0   Glucose 70 - 110 mg/dL 91   Calcium 8.7 - 10.5 mg/dL 9.2   Alkaline Phosphatase 55 - 135 U/L 58   PROTEIN TOTAL 6.0 - 8.4 g/dL 7.4   Albumin 3.5 - 5.2 g/dL 4.0   BILIRUBIN TOTAL 0.1 - 1.0 mg/dL 0.4   AST 10 - 40 U/L 18   ALT 10 - 44 U/L 14   Cholesterol 120 - 199 mg/dL 198   HDL 40 - 75 mg/dL 55   HDL/Cholesterol Ratio 20.0 - 50.0 % 27.8   LDL Cholesterol External 63.0 - 159.0 mg/dL 122.2   Non-HDL Cholesterol mg/dL 143   Total Cholesterol/HDL Ratio 2.0 - 5.0  3.6   Triglycerides 30 - 150 mg/dL 104   Hemoglobin A1C External 4.0 - 5.6 % 5.2   Estimated Avg Glucose 68 - 131 mg/dL 103   TSH 0.400 - 4.000 uIU/mL 2.473       Assessment:       1. Mixed hyperlipidemia    2. Recurrent major depressive disorder, in partial remission    3. Panic attack    4. Severe obesity with body mass index (BMI) of 35.0 to 35.9 and comorbidity    5. Gastroesophageal reflux disease without esophagitis    6. Age-related osteoporosis without current pathological fracture    7. Tortuous aorta    8. Encounter for preventive health examination    9. FH: CAD (coronary artery disease)          Plan:     Mixed hyperlipidemia, Tortuous aorta- cont statin.    Recurrent major depressive disorder, in partial remission, Panic attack- cont paxil buspar prn.  -     Ambulatory referral/consult to [facility]; Future; Expected date: 07/28/2023    Severe obesity with body mass index (BMI) of 35.0 to 35.9 and comorbidity- down 7#, continue cut back sugars.    Gastroesophageal reflux disease without esophagitis- cont ppi.    Age-related osteoporosis without current pathological fracture- cont bisphos, RTC 1y.    Encounter for preventive health examination- Start monthly self breast exams.  CT Ca score.     Monitor BPs, RTC 1mo.

## 2023-07-17 ENCOUNTER — HOSPITAL ENCOUNTER (OUTPATIENT)
Dept: RADIOLOGY | Facility: HOSPITAL | Age: 68
Discharge: HOME | End: 2023-07-17
Attending: FAMILY MEDICINE
Payer: MEDICARE

## 2023-07-17 VITALS — WEIGHT: 182.31 LBS | BODY MASS INDEX: 34.42 KG/M2 | HEIGHT: 61 IN

## 2023-07-17 DIAGNOSIS — Z12.31 ENCOUNTER FOR SCREENING MAMMOGRAM FOR MALIGNANT NEOPLASM OF BREAST: ICD-10-CM

## 2023-07-17 PROCEDURE — 77063 MAMMO DIGITAL SCREENING BILAT WITH TOMO: ICD-10-PCS | Mod: 26,HCNC,, | Performed by: RADIOLOGY

## 2023-07-17 PROCEDURE — 77063 BREAST TOMOSYNTHESIS BI: CPT | Mod: 26,HCNC,, | Performed by: RADIOLOGY

## 2023-07-17 PROCEDURE — 77067 SCR MAMMO BI INCL CAD: CPT | Mod: TC,HCNC

## 2023-07-17 PROCEDURE — 77067 SCR MAMMO BI INCL CAD: CPT | Mod: 26,HCNC,, | Performed by: RADIOLOGY

## 2023-07-17 PROCEDURE — 77067 MAMMO DIGITAL SCREENING BILAT WITH TOMO: ICD-10-PCS | Mod: 26,HCNC,, | Performed by: RADIOLOGY

## 2023-07-21 ENCOUNTER — OFFICE VISIT (OUTPATIENT)
Dept: PRIMARY CARE CLINIC | Facility: CLINIC | Age: 68
End: 2023-07-21
Payer: MEDICARE

## 2023-07-21 VITALS
OXYGEN SATURATION: 97 % | SYSTOLIC BLOOD PRESSURE: 139 MMHG | HEIGHT: 61 IN | WEIGHT: 181.63 LBS | HEART RATE: 70 BPM | DIASTOLIC BLOOD PRESSURE: 65 MMHG | TEMPERATURE: 98 F | BODY MASS INDEX: 34.29 KG/M2

## 2023-07-21 DIAGNOSIS — F33.41 RECURRENT MAJOR DEPRESSIVE DISORDER, IN PARTIAL REMISSION: ICD-10-CM

## 2023-07-21 DIAGNOSIS — Z00.00 ENCOUNTER FOR PREVENTIVE HEALTH EXAMINATION: ICD-10-CM

## 2023-07-21 DIAGNOSIS — K21.9 GASTROESOPHAGEAL REFLUX DISEASE WITHOUT ESOPHAGITIS: ICD-10-CM

## 2023-07-21 DIAGNOSIS — I77.1 TORTUOUS AORTA: ICD-10-CM

## 2023-07-21 DIAGNOSIS — F41.0 PANIC ATTACK: ICD-10-CM

## 2023-07-21 DIAGNOSIS — E66.01 SEVERE OBESITY WITH BODY MASS INDEX (BMI) OF 35.0 TO 35.9 AND COMORBIDITY: ICD-10-CM

## 2023-07-21 DIAGNOSIS — E78.2 MIXED HYPERLIPIDEMIA: Primary | Chronic | ICD-10-CM

## 2023-07-21 DIAGNOSIS — M81.0 AGE-RELATED OSTEOPOROSIS WITHOUT CURRENT PATHOLOGICAL FRACTURE: ICD-10-CM

## 2023-07-21 DIAGNOSIS — Z82.49 FH: CAD (CORONARY ARTERY DISEASE): ICD-10-CM

## 2023-07-21 PROCEDURE — 3044F HG A1C LEVEL LT 7.0%: CPT | Mod: HCNC,CPTII,S$GLB, | Performed by: INTERNAL MEDICINE

## 2023-07-21 PROCEDURE — 1101F PR PT FALLS ASSESS DOC 0-1 FALLS W/OUT INJ PAST YR: ICD-10-PCS | Mod: HCNC,CPTII,S$GLB, | Performed by: INTERNAL MEDICINE

## 2023-07-21 PROCEDURE — 3008F BODY MASS INDEX DOCD: CPT | Mod: HCNC,CPTII,S$GLB, | Performed by: INTERNAL MEDICINE

## 2023-07-21 PROCEDURE — 99999 PR PBB SHADOW E&M-EST. PATIENT-LVL V: ICD-10-PCS | Mod: PBBFAC,HCNC,, | Performed by: INTERNAL MEDICINE

## 2023-07-21 PROCEDURE — 99214 OFFICE O/P EST MOD 30 MIN: CPT | Mod: HCNC,S$GLB,, | Performed by: INTERNAL MEDICINE

## 2023-07-21 PROCEDURE — 3044F PR MOST RECENT HEMOGLOBIN A1C LEVEL <7.0%: ICD-10-PCS | Mod: HCNC,CPTII,S$GLB, | Performed by: INTERNAL MEDICINE

## 2023-07-21 PROCEDURE — 3075F SYST BP GE 130 - 139MM HG: CPT | Mod: HCNC,CPTII,S$GLB, | Performed by: INTERNAL MEDICINE

## 2023-07-21 PROCEDURE — 1159F PR MEDICATION LIST DOCUMENTED IN MEDICAL RECORD: ICD-10-PCS | Mod: HCNC,CPTII,S$GLB, | Performed by: INTERNAL MEDICINE

## 2023-07-21 PROCEDURE — 3075F PR MOST RECENT SYSTOLIC BLOOD PRESS GE 130-139MM HG: ICD-10-PCS | Mod: HCNC,CPTII,S$GLB, | Performed by: INTERNAL MEDICINE

## 2023-07-21 PROCEDURE — 3078F DIAST BP <80 MM HG: CPT | Mod: HCNC,CPTII,S$GLB, | Performed by: INTERNAL MEDICINE

## 2023-07-21 PROCEDURE — 1101F PT FALLS ASSESS-DOCD LE1/YR: CPT | Mod: HCNC,CPTII,S$GLB, | Performed by: INTERNAL MEDICINE

## 2023-07-21 PROCEDURE — 99999 PR PBB SHADOW E&M-EST. PATIENT-LVL V: CPT | Mod: PBBFAC,HCNC,, | Performed by: INTERNAL MEDICINE

## 2023-07-21 PROCEDURE — 3008F PR BODY MASS INDEX (BMI) DOCUMENTED: ICD-10-PCS | Mod: HCNC,CPTII,S$GLB, | Performed by: INTERNAL MEDICINE

## 2023-07-21 PROCEDURE — 1126F PR PAIN SEVERITY QUANTIFIED, NO PAIN PRESENT: ICD-10-PCS | Mod: HCNC,CPTII,S$GLB, | Performed by: INTERNAL MEDICINE

## 2023-07-21 PROCEDURE — 99214 PR OFFICE/OUTPT VISIT, EST, LEVL IV, 30-39 MIN: ICD-10-PCS | Mod: HCNC,S$GLB,, | Performed by: INTERNAL MEDICINE

## 2023-07-21 PROCEDURE — 1126F AMNT PAIN NOTED NONE PRSNT: CPT | Mod: HCNC,CPTII,S$GLB, | Performed by: INTERNAL MEDICINE

## 2023-07-21 PROCEDURE — 3288F PR FALLS RISK ASSESSMENT DOCUMENTED: ICD-10-PCS | Mod: HCNC,CPTII,S$GLB, | Performed by: INTERNAL MEDICINE

## 2023-07-21 PROCEDURE — 3078F PR MOST RECENT DIASTOLIC BLOOD PRESSURE < 80 MM HG: ICD-10-PCS | Mod: HCNC,CPTII,S$GLB, | Performed by: INTERNAL MEDICINE

## 2023-07-21 PROCEDURE — 1157F PR ADVANCE CARE PLAN OR EQUIV PRESENT IN MEDICAL RECORD: ICD-10-PCS | Mod: HCNC,CPTII,S$GLB, | Performed by: INTERNAL MEDICINE

## 2023-07-21 PROCEDURE — 1157F ADVNC CARE PLAN IN RCRD: CPT | Mod: HCNC,CPTII,S$GLB, | Performed by: INTERNAL MEDICINE

## 2023-07-21 PROCEDURE — 3288F FALL RISK ASSESSMENT DOCD: CPT | Mod: HCNC,CPTII,S$GLB, | Performed by: INTERNAL MEDICINE

## 2023-07-21 PROCEDURE — 1159F MED LIST DOCD IN RCRD: CPT | Mod: HCNC,CPTII,S$GLB, | Performed by: INTERNAL MEDICINE

## 2023-07-27 ENCOUNTER — PATIENT MESSAGE (OUTPATIENT)
Dept: PRIMARY CARE CLINIC | Facility: CLINIC | Age: 68
End: 2023-07-27

## 2023-07-27 ENCOUNTER — HOSPITAL ENCOUNTER (OUTPATIENT)
Dept: RADIOLOGY | Facility: HOSPITAL | Age: 68
Discharge: HOME | End: 2023-07-27
Attending: INTERNAL MEDICINE
Payer: MEDICARE

## 2023-07-27 DIAGNOSIS — I25.10 CORONARY ARTERY CALCIFICATION SEEN ON CT SCAN: Primary | ICD-10-CM

## 2023-07-27 DIAGNOSIS — Z82.49 FH: CAD (CORONARY ARTERY DISEASE): ICD-10-CM

## 2023-07-27 PROCEDURE — 75571 CT HRT W/O DYE W/CA TEST: CPT | Mod: TC,HCNC

## 2023-07-27 PROCEDURE — 75571 CT HRT W/O DYE W/CA TEST: CPT | Mod: 26,HCNC,, | Performed by: RADIOLOGY

## 2023-07-27 PROCEDURE — 75571 CT CALCIUM SCORING CARDIAC: ICD-10-PCS | Mod: 26,HCNC,, | Performed by: RADIOLOGY

## 2023-07-27 RX ORDER — ATORVASTATIN CALCIUM 40 MG/1
40 TABLET, FILM COATED ORAL DAILY
Qty: 90 TABLET | Refills: 3 | Status: SHIPPED | OUTPATIENT
Start: 2023-07-27 | End: 2024-07-26

## 2023-07-27 NOTE — TELEPHONE ENCOUNTER
PC with pt    High CT Ca 754.    Stop lovastatin, start atorva 40mg.  Sched with Cardiologist at [facility].  Sched lab prior to appt 3mo.  SM

## 2023-07-28 ENCOUNTER — PATIENT MESSAGE (OUTPATIENT)
Dept: PRIMARY CARE CLINIC | Facility: CLINIC | Age: 68
End: 2023-07-28
Payer: MEDICARE

## 2023-08-10 RX ORDER — BUSPIRONE HYDROCHLORIDE 7.5 MG/1
7.5 TABLET ORAL 3 TIMES DAILY PRN
Qty: 60 TABLET | Refills: 11 | Status: SHIPPED | OUTPATIENT
Start: 2023-08-10 | End: 2024-08-09

## 2023-08-15 ENCOUNTER — DOCUMENTATION ONLY (OUTPATIENT)
Dept: PRIMARY CARE CLINIC | Facility: CLINIC | Age: 68
End: 2023-08-15
Payer: MEDICARE

## 2023-08-15 NOTE — PROGRESS NOTES
Patient did not respond to the tamyca message sent previously. Called and left voicemail offering an appointment.

## 2023-08-16 ENCOUNTER — OFFICE VISIT (OUTPATIENT)
Dept: RHEUMATOLOGY | Facility: CLINIC | Age: 68
End: 2023-08-16
Payer: MEDICARE

## 2023-08-16 VITALS
DIASTOLIC BLOOD PRESSURE: 70 MMHG | HEIGHT: 61 IN | SYSTOLIC BLOOD PRESSURE: 116 MMHG | BODY MASS INDEX: 34.3 KG/M2 | HEART RATE: 73 BPM | WEIGHT: 181.69 LBS

## 2023-08-16 DIAGNOSIS — M85.80 OSTEOPENIA WITH HIGH RISK OF FRACTURE: Primary | ICD-10-CM

## 2023-08-16 DIAGNOSIS — L03.116 CELLULITIS OF LEFT LEG: ICD-10-CM

## 2023-08-16 DIAGNOSIS — Z51.81 MEDICATION MONITORING ENCOUNTER: ICD-10-CM

## 2023-08-16 DIAGNOSIS — M85.89 OTHER SPECIFIED DISORDERS OF BONE DENSITY AND STRUCTURE, MULTIPLE SITES: ICD-10-CM

## 2023-08-16 PROCEDURE — 1100F PR PT FALLS ASSESS DOC 2+ FALLS/FALL W/INJURY/YR: ICD-10-PCS | Mod: HCNC,CPTII,S$GLB, | Performed by: PHYSICIAN ASSISTANT

## 2023-08-16 PROCEDURE — 1159F PR MEDICATION LIST DOCUMENTED IN MEDICAL RECORD: ICD-10-PCS | Mod: HCNC,CPTII,S$GLB, | Performed by: PHYSICIAN ASSISTANT

## 2023-08-16 PROCEDURE — 1157F ADVNC CARE PLAN IN RCRD: CPT | Mod: HCNC,CPTII,S$GLB, | Performed by: PHYSICIAN ASSISTANT

## 2023-08-16 PROCEDURE — 99999 PR PBB SHADOW E&M-EST. PATIENT-LVL V: CPT | Mod: PBBFAC,HCNC,, | Performed by: PHYSICIAN ASSISTANT

## 2023-08-16 PROCEDURE — 3008F BODY MASS INDEX DOCD: CPT | Mod: HCNC,CPTII,S$GLB, | Performed by: PHYSICIAN ASSISTANT

## 2023-08-16 PROCEDURE — 1157F PR ADVANCE CARE PLAN OR EQUIV PRESENT IN MEDICAL RECORD: ICD-10-PCS | Mod: HCNC,CPTII,S$GLB, | Performed by: PHYSICIAN ASSISTANT

## 2023-08-16 PROCEDURE — 99214 PR OFFICE/OUTPT VISIT, EST, LEVL IV, 30-39 MIN: ICD-10-PCS | Mod: HCNC,S$GLB,, | Performed by: PHYSICIAN ASSISTANT

## 2023-08-16 PROCEDURE — 3078F DIAST BP <80 MM HG: CPT | Mod: HCNC,CPTII,S$GLB, | Performed by: PHYSICIAN ASSISTANT

## 2023-08-16 PROCEDURE — 3078F PR MOST RECENT DIASTOLIC BLOOD PRESSURE < 80 MM HG: ICD-10-PCS | Mod: HCNC,CPTII,S$GLB, | Performed by: PHYSICIAN ASSISTANT

## 2023-08-16 PROCEDURE — 3288F PR FALLS RISK ASSESSMENT DOCUMENTED: ICD-10-PCS | Mod: HCNC,CPTII,S$GLB, | Performed by: PHYSICIAN ASSISTANT

## 2023-08-16 PROCEDURE — 1160F RVW MEDS BY RX/DR IN RCRD: CPT | Mod: HCNC,CPTII,S$GLB, | Performed by: PHYSICIAN ASSISTANT

## 2023-08-16 PROCEDURE — 1100F PTFALLS ASSESS-DOCD GE2>/YR: CPT | Mod: HCNC,CPTII,S$GLB, | Performed by: PHYSICIAN ASSISTANT

## 2023-08-16 PROCEDURE — 3074F SYST BP LT 130 MM HG: CPT | Mod: HCNC,CPTII,S$GLB, | Performed by: PHYSICIAN ASSISTANT

## 2023-08-16 PROCEDURE — 3288F FALL RISK ASSESSMENT DOCD: CPT | Mod: HCNC,CPTII,S$GLB, | Performed by: PHYSICIAN ASSISTANT

## 2023-08-16 PROCEDURE — 99999 PR PBB SHADOW E&M-EST. PATIENT-LVL V: ICD-10-PCS | Mod: PBBFAC,HCNC,, | Performed by: PHYSICIAN ASSISTANT

## 2023-08-16 PROCEDURE — 3074F PR MOST RECENT SYSTOLIC BLOOD PRESSURE < 130 MM HG: ICD-10-PCS | Mod: HCNC,CPTII,S$GLB, | Performed by: PHYSICIAN ASSISTANT

## 2023-08-16 PROCEDURE — 1125F AMNT PAIN NOTED PAIN PRSNT: CPT | Mod: HCNC,CPTII,S$GLB, | Performed by: PHYSICIAN ASSISTANT

## 2023-08-16 PROCEDURE — 99214 OFFICE O/P EST MOD 30 MIN: CPT | Mod: HCNC,S$GLB,, | Performed by: PHYSICIAN ASSISTANT

## 2023-08-16 PROCEDURE — 3044F PR MOST RECENT HEMOGLOBIN A1C LEVEL <7.0%: ICD-10-PCS | Mod: HCNC,CPTII,S$GLB, | Performed by: PHYSICIAN ASSISTANT

## 2023-08-16 PROCEDURE — 1160F PR REVIEW ALL MEDS BY PRESCRIBER/CLIN PHARMACIST DOCUMENTED: ICD-10-PCS | Mod: HCNC,CPTII,S$GLB, | Performed by: PHYSICIAN ASSISTANT

## 2023-08-16 PROCEDURE — 3044F HG A1C LEVEL LT 7.0%: CPT | Mod: HCNC,CPTII,S$GLB, | Performed by: PHYSICIAN ASSISTANT

## 2023-08-16 PROCEDURE — 1159F MED LIST DOCD IN RCRD: CPT | Mod: HCNC,CPTII,S$GLB, | Performed by: PHYSICIAN ASSISTANT

## 2023-08-16 PROCEDURE — 1125F PR PAIN SEVERITY QUANTIFIED, PAIN PRESENT: ICD-10-PCS | Mod: HCNC,CPTII,S$GLB, | Performed by: PHYSICIAN ASSISTANT

## 2023-08-16 PROCEDURE — 3008F PR BODY MASS INDEX (BMI) DOCUMENTED: ICD-10-PCS | Mod: HCNC,CPTII,S$GLB, | Performed by: PHYSICIAN ASSISTANT

## 2023-08-16 RX ORDER — AMOXICILLIN AND CLAVULANATE POTASSIUM 875; 125 MG/1; MG/1
1 TABLET, FILM COATED ORAL 2 TIMES DAILY
Qty: 20 TABLET | Refills: 0 | Status: SHIPPED | OUTPATIENT
Start: 2023-08-16 | End: 2023-08-26
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
## 2023-08-24 ENCOUNTER — HOSPITAL ENCOUNTER (OUTPATIENT)
Dept: RADIOLOGY | Facility: HOSPITAL | Age: 68
Discharge: HOME | End: 2023-08-24
Attending: NURSE PRACTITIONER
Payer: MEDICARE

## 2023-08-24 ENCOUNTER — OFFICE VISIT (OUTPATIENT)
Dept: PRIMARY CARE CLINIC | Facility: CLINIC | Age: 68
End: 2023-08-24
Payer: MEDICARE

## 2023-08-24 VITALS
SYSTOLIC BLOOD PRESSURE: 122 MMHG | OXYGEN SATURATION: 98 % | TEMPERATURE: 98 F | HEIGHT: 61 IN | HEART RATE: 69 BPM | WEIGHT: 179.88 LBS | DIASTOLIC BLOOD PRESSURE: 64 MMHG | BODY MASS INDEX: 33.96 KG/M2

## 2023-08-24 DIAGNOSIS — R93.1 ELEVATED CORONARY ARTERY CALCIUM SCORE: ICD-10-CM

## 2023-08-24 DIAGNOSIS — M25.551 RIGHT HIP PAIN: ICD-10-CM

## 2023-08-24 DIAGNOSIS — E78.2 MIXED HYPERLIPIDEMIA: Primary | Chronic | ICD-10-CM

## 2023-08-24 DIAGNOSIS — M54.31 SCIATICA OF RIGHT SIDE: ICD-10-CM

## 2023-08-24 PROCEDURE — 1159F PR MEDICATION LIST DOCUMENTED IN MEDICAL RECORD: ICD-10-PCS | Mod: HCNC,CPTII,S$GLB, | Performed by: NURSE PRACTITIONER

## 2023-08-24 PROCEDURE — 99204 PR OFFICE/OUTPT VISIT, NEW, LEVL IV, 45-59 MIN: ICD-10-PCS | Mod: HCNC,25,S$GLB, | Performed by: NURSE PRACTITIONER

## 2023-08-24 PROCEDURE — 73502 X-RAY EXAM HIP UNI 2-3 VIEWS: CPT | Mod: 26,HCNC,RT, | Performed by: RADIOLOGY

## 2023-08-24 PROCEDURE — 1100F PR PT FALLS ASSESS DOC 2+ FALLS/FALL W/INJURY/YR: ICD-10-PCS | Mod: HCNC,CPTII,S$GLB, | Performed by: NURSE PRACTITIONER

## 2023-08-24 PROCEDURE — 3288F PR FALLS RISK ASSESSMENT DOCUMENTED: ICD-10-PCS | Mod: HCNC,CPTII,S$GLB, | Performed by: NURSE PRACTITIONER

## 2023-08-24 PROCEDURE — 99999 PR PBB SHADOW E&M-EST. PATIENT-LVL V: CPT | Mod: PBBFAC,HCNC,, | Performed by: NURSE PRACTITIONER

## 2023-08-24 PROCEDURE — 3288F FALL RISK ASSESSMENT DOCD: CPT | Mod: HCNC,CPTII,S$GLB, | Performed by: NURSE PRACTITIONER

## 2023-08-24 PROCEDURE — 73502 X-RAY EXAM HIP UNI 2-3 VIEWS: CPT | Mod: TC,HCNC,FY,PO,RT

## 2023-08-24 PROCEDURE — 3074F SYST BP LT 130 MM HG: CPT | Mod: HCNC,CPTII,S$GLB, | Performed by: NURSE PRACTITIONER

## 2023-08-24 PROCEDURE — 3078F PR MOST RECENT DIASTOLIC BLOOD PRESSURE < 80 MM HG: ICD-10-PCS | Mod: HCNC,CPTII,S$GLB, | Performed by: NURSE PRACTITIONER

## 2023-08-24 PROCEDURE — 99999 PR PBB SHADOW E&M-EST. PATIENT-LVL V: ICD-10-PCS | Mod: PBBFAC,HCNC,, | Performed by: NURSE PRACTITIONER

## 2023-08-24 PROCEDURE — 3044F PR MOST RECENT HEMOGLOBIN A1C LEVEL <7.0%: ICD-10-PCS | Mod: HCNC,CPTII,S$GLB, | Performed by: NURSE PRACTITIONER

## 2023-08-24 PROCEDURE — 73502 XR HIP WITH PELVIS WHEN PERFORMED, 2 OR 3  VIEWS RIGHT: ICD-10-PCS | Mod: 26,HCNC,RT, | Performed by: RADIOLOGY

## 2023-08-24 PROCEDURE — 1157F ADVNC CARE PLAN IN RCRD: CPT | Mod: HCNC,CPTII,S$GLB, | Performed by: NURSE PRACTITIONER

## 2023-08-24 PROCEDURE — 1100F PTFALLS ASSESS-DOCD GE2>/YR: CPT | Mod: HCNC,CPTII,S$GLB, | Performed by: NURSE PRACTITIONER

## 2023-08-24 PROCEDURE — 3078F DIAST BP <80 MM HG: CPT | Mod: HCNC,CPTII,S$GLB, | Performed by: NURSE PRACTITIONER

## 2023-08-24 PROCEDURE — 99204 OFFICE O/P NEW MOD 45 MIN: CPT | Mod: HCNC,25,S$GLB, | Performed by: NURSE PRACTITIONER

## 2023-08-24 PROCEDURE — 1159F MED LIST DOCD IN RCRD: CPT | Mod: HCNC,CPTII,S$GLB, | Performed by: NURSE PRACTITIONER

## 2023-08-24 PROCEDURE — 3008F BODY MASS INDEX DOCD: CPT | Mod: HCNC,CPTII,S$GLB, | Performed by: NURSE PRACTITIONER

## 2023-08-24 PROCEDURE — 3044F HG A1C LEVEL LT 7.0%: CPT | Mod: HCNC,CPTII,S$GLB, | Performed by: NURSE PRACTITIONER

## 2023-08-24 PROCEDURE — 96372 PR INJECTION,THERAP/PROPH/DIAG2ST, IM OR SUBCUT: ICD-10-PCS | Mod: HCNC,S$GLB,, | Performed by: NURSE PRACTITIONER

## 2023-08-24 PROCEDURE — 3008F PR BODY MASS INDEX (BMI) DOCUMENTED: ICD-10-PCS | Mod: HCNC,CPTII,S$GLB, | Performed by: NURSE PRACTITIONER

## 2023-08-24 PROCEDURE — 96372 THER/PROPH/DIAG INJ SC/IM: CPT | Mod: HCNC,S$GLB,, | Performed by: NURSE PRACTITIONER

## 2023-08-24 PROCEDURE — 1157F PR ADVANCE CARE PLAN OR EQUIV PRESENT IN MEDICAL RECORD: ICD-10-PCS | Mod: HCNC,CPTII,S$GLB, | Performed by: NURSE PRACTITIONER

## 2023-08-24 PROCEDURE — 3074F PR MOST RECENT SYSTOLIC BLOOD PRESSURE < 130 MM HG: ICD-10-PCS | Mod: HCNC,CPTII,S$GLB, | Performed by: NURSE PRACTITIONER

## 2023-08-24 RX ORDER — KETOROLAC TROMETHAMINE 30 MG/ML
30 INJECTION, SOLUTION INTRAMUSCULAR; INTRAVENOUS
Status: DISCONTINUED | OUTPATIENT
Start: 2023-08-24 | End: 2023-08-24

## 2023-08-24 RX ORDER — TRIAMCINOLONE ACETONIDE 40 MG/ML
60 INJECTION, SUSPENSION INTRA-ARTICULAR; INTRAMUSCULAR
Status: COMPLETED | OUTPATIENT
Start: 2023-08-24 | End: 2023-08-24

## 2023-08-24 RX ORDER — KETOROLAC TROMETHAMINE 30 MG/ML
30 INJECTION, SOLUTION INTRAMUSCULAR; INTRAVENOUS
Status: COMPLETED | OUTPATIENT
Start: 2023-08-24 | End: 2023-08-24

## 2023-08-24 RX ADMIN — KETOROLAC TROMETHAMINE 30 MG: 30 INJECTION, SOLUTION INTRAMUSCULAR; INTRAVENOUS at 11:08

## 2023-08-24 RX ADMIN — TRIAMCINOLONE ACETONIDE 60 MG: 40 INJECTION, SUSPENSION INTRA-ARTICULAR; INTRAMUSCULAR at 10:08

## 2023-08-24 NOTE — PATIENT INSTRUCTIONS
Monitor blood pressure and record - bright to visit with Dr. Velez    Possible physical therapy    Licensed clinic     Cardiology appointment    Xray for right hip

## 2023-08-24 NOTE — PROGRESS NOTES
Mouna Chandra  08/25/2023  [id number]    Ofe Velez MD  Patient Care Team:  Ofe Velez MD as PCP - General (Internal Medicine)  Bambi Jasso LPN as Care Coordinator      [facility]      Chief Complaint:  Chief Complaint   Patient presents with    Follow-up     One month follow up. Pt is complaining of right sciatic nerve pain.        History of Present Illness:    F/U from last visit with Dr. Velez on 7/21/23      Pt has not made appointment with Cardiology given hx of family CAD and CT Ca score.      High CT Ca 754.  Pt to see Dr. Junior, Cardiology in September.      Stop ped lovastatin, started atorva 40mg.      Pt has not been monitoring BP at home -encouraged participation      7/21/23  Mixed hyperlipidemia, Tortuous aorta- cont statin.     Recurrent major depressive disorder, in partial remission, Panic attack- cont paxil, buspar prn.  -     Ambulatory referral/consult to [facility]; Future; Expected date: 07/28/2023     Severe obesity with body mass index (BMI) of 35.0 to 35.9 and comorbidity- down 7#, continue cut back sugars.     Gastroesophageal reflux disease without esophagitis- cont ppi.     Age-related osteoporosis without current pathological fracture- cont bisphos, RTC 1y.     Encounter for preventive health examination- Start monthly self breast exams.  CT Ca score.      Monitor BPs, RTC 1mo.    One day ago fell through wooden steps coming into her home. - Stepped through steps approx 3 feet. Resulted in abrasion to left pretibial leg.     Improvement in leg cellulitis with Augmentin that was prescribed by Augmentin    chronic hx of right sciatica with flare - helps with Ibuprofen    Today, also right anterior hip pain that radiates down to the knee and pain right sciatic neurve.  (Frequently)  Takes gabapentin but not helping    Has lost approx 2 pounds since last visit  116/70    New brown watery diarrhea x 2 weeks  - 3 times daily usually in AM started atorvastatin - wondering if associated to new Rx of atorvastatin.      The following were reviewed: Active problem list, medication list, allergies, family history, social history, and Health Maintenance.     History:  Past Medical History:   Diagnosis Date    Anxiety 12/21/2016    Anxiety and depression     Familial juvenile macular degeneration syndrome - Both Eyes 11/12/2013    GERD (gastroesophageal reflux disease)     Hyperlipidemia LDL goal < 100     Lumbar disc disease     Dr. Chandra    Palpitation 12/21/2016    Panic attack 12/21/2016    Vitamin D deficiency      Past Surgical History:   Procedure Laterality Date    COLONOSCOPY      EXCISION OF GANGLION CYST OF HAND Right 3/16/2023    Procedure: EXCISION, GANGLION CYST, HAND;  Surgeon: Kurt Moreno MD;  Location: [facility] OR;  Service: Orthopedics;  Laterality: Right;  excision ganglion cyst right index finger dorsal metacarpophalangeal joint.    HYSTERECTOMY      INJECTION OF ANESTHETIC AGENT AROUND MEDIAL BRANCH NERVES INNERVATING CERVICAL FACET JOINT Bilateral 04/14/2021    Procedure: Bilateral C3-5 MBB with RN IV sedation;  Surgeon: Steve Gutierrez MD;  Location: [facility] PAIN MGT;  Service: Pain Management;  Laterality: Bilateral;    SELECTIVE INJECTION OF ANESTHETIC AGENT AROUND LUMBAR SPINAL NERVE ROOT BY TRANSFORAMINAL APPROACH Right 02/25/2021    Procedure: BLOCK, SPINAL NERVE ROOT, LUMBAR, SELECTIVE, TRANSFORAMINAL APPROACH Right L4-5, l5-S1 RN IV sedation;  Surgeon: Steve Gutierrez MD;  Location: [facility] PAIN MGT;  Service: Pain Management;  Laterality: Right;    TOTAL ABDOMINAL HYSTERECTOMY W/ BILATERAL SALPINGOOPHORECTOMY  2002     Family History   Problem Relation Age of Onset    Diabetes Mother     Hypertension Mother     Heart failure Father     Heart attack Father     Amblyopia Sister     Macular degeneration Sister     Blindness Sister     Chronic back pain Sister     Heart disease Brother 45    Crohn's  disease Brother     Coronary artery disease Brother     Heart attack Paternal Grandmother     Heart failure Paternal Grandfather     Cataracts Paternal Uncle     Heart failure Paternal Uncle     Stroke Paternal Uncle     Cancer Neg Hx     Glaucoma Neg Hx     Retinal detachment Neg Hx     Strabismus Neg Hx     Thyroid disease Neg Hx     Colon cancer Neg Hx      Patient Active Problem List   Diagnosis    Familial drusen of both eyes    Mixed hyperlipidemia    Mixed anxiety and depressive disorder    Lumbar disc disease    Vitamin D deficiency    Allergic rhinitis    GERD (gastroesophageal reflux disease)    Diaphragmatic hernia without mention of obstruction or gangrene    Panic attack    Palpitations    Advanced atrophic nonexudative age-related macular degeneration of both eyes without subfoveal involvement    Chest pain syndrome    Tortuous aorta    History of 2019 novel coronavirus disease (COVID-19)    Lumbar radiculopathy    Cervical facet joint syndrome    Severe obesity with body mass index (BMI) of 35.0 to 35.9 and comorbidity    Age-related osteoporosis without current pathological fracture    Recurrent major depressive disorder, in partial remission    Ganglion cyst    Macular degeneration    Sciatica of right side    Elevated coronary artery calcium score    Right hip pain     Review of patient's allergies indicates:   Allergen Reactions    Adhesive Rash    Codeine Nausea And Vomiting    Iodine     Iodine containing multivitamin Nausea Only    Lanolin Hives    Latex, natural rubber Hives    Neosporin [benzalkonium chloride] Hives    Shellfish containing products Nausea And Vomiting    Neosporin (neomycin-polymyx) Hives, Itching and Rash       Medications:  Current Outpatient Medications on File Prior to Visit   Medication Sig Dispense Refill    alendronate (FOSAMAX) 70 MG tablet Take 1 tablet (70 mg total) by mouth every 7 days. 4 tablet 11    amoxicillin-clavulanate 875-125mg (AUGMENTIN) 875-125 mg per  tablet Take 1 tablet by mouth 2 (two) times daily. for 10 days 20 tablet 0    atorvastatin (LIPITOR) 40 MG tablet Take 1 tablet (40 mg total) by mouth once daily. 90 tablet 3    busPIRone (BUSPAR) 7.5 MG tablet Take 1 tablet (7.5 mg total) by mouth 3 (three) times daily as needed (anxiety). 60 tablet 11    cholecalciferol, vitamin D3, (VITAMIN D3) 50 mcg (2,000 unit) Cap capsule Take 1 capsule (2,000 Units total) by mouth once daily. 100 capsule 6    diclofenac sodium (VOLTAREN) 1 % Gel APPLY 2 GRAMS TOPICALLY TO THE AFFECTED AREA EVERY  g 2    fluticasone propionate (FLONASE) 50 mcg/actuation nasal spray 1 spray (50 mcg total) by Each Nostril route once daily. 15.8 mL 3    gabapentin (NEURONTIN) 100 MG capsule Take 1-3 capsules (100-300 mg total) by mouth nightly as needed (nerve pain). 90 capsule 11    ibuprofen (ADVIL,MOTRIN) 800 MG tablet Take 800 mg by mouth every 6 (six) hours as needed for Pain.      Lactobacillus rhamnosus GG (CULTURELLE) 10 billion cell capsule Take 1 capsule by mouth once daily.      magnesium 250 mg Tab Take 250 mg by mouth once.      paroxetine (PAXIL) 40 MG tablet TAKE 1 TABLET(40 MG) BY MOUTH EVERY MORNING 90 tablet 3    traZODone (DESYREL) 50 MG tablet TAKE 1 AND 1/2 TABLETS(75 MG) BY MOUTH EVERY EVENING 45 tablet 11    vit A/vit C/vit E/zinc/copper (OCUVITE PRESERVISION ORAL) Take by mouth once daily.      vitamin E 400 UNIT capsule Take 400 Units by mouth once daily.       Current Facility-Administered Medications on File Prior to Visit   Medication Dose Route Frequency Provider Last Rate Last Admin    cefazolin (ANCEF) 2 gram in dextrose 5% 50 mL IVPB (premix)  2 g Intravenous On Call Procedure Racheal Arceo PA-C        chlorhexidine 0.12 % solution 10 mL  10 mL Mouth/Throat On Call Procedure Racheal Arceo PA-C   10 mL at 03/16/23 0610       Medications have been reviewed and reconciled with patient at visit today.      Exam:  Vitals:    08/24/23 0957   BP: 122/64   Pulse:  69   Temp: 97.5 °F (36.4 °C)     Weight: 81.6 kg (179 lb 14.4 oz)   Body mass index is 33.99 kg/m².      BP Readings from Last 3 Encounters:   08/24/23 122/64   08/16/23 116/70   07/21/23 139/65     Wt Readings from Last 3 Encounters:   08/24/23 0957 81.6 kg (179 lb 14.4 oz)   08/16/23 1309 82.4 kg (181 lb 10.5 oz)   07/21/23 0936 82.4 kg (181 lb 9.6 oz)        REVIEW OF SYSTEMS  Review of Systems   Constitutional:  Negative for chills, fever and malaise/fatigue.   HENT:  Negative for congestion, ear discharge, ear pain and sore throat.    Respiratory:  Negative for cough and shortness of breath.    Cardiovascular:  Negative for chest pain, palpitations and leg swelling.   Gastrointestinal:  Positive for diarrhea. Negative for abdominal pain, nausea and vomiting.   Genitourinary:  Negative for dysuria and frequency.   Musculoskeletal:  Positive for back pain and myalgias.   Skin:         Wound to left leg   Neurological:  Negative for dizziness, focal weakness and headaches.   Psychiatric/Behavioral:  Negative for depression. The patient is not nervous/anxious.        Physical Exam  Vitals reviewed.   Constitutional:       General: She is not in acute distress.     Appearance: Normal appearance.   HENT:      Head: Normocephalic and atraumatic.      Nose: Nose normal.      Mouth/Throat:      Mouth: Mucous membranes are moist.   Eyes:      General: No scleral icterus.     Conjunctiva/sclera: Conjunctivae normal.   Cardiovascular:      Rate and Rhythm: Normal rate and regular rhythm.      Heart sounds: No murmur heard.  Pulmonary:      Effort: Pulmonary effort is normal. No respiratory distress.      Breath sounds: Normal breath sounds.   Abdominal:      Palpations: Abdomen is soft. There is no mass.      Tenderness: There is no abdominal tenderness.   Musculoskeletal:         General: No swelling or deformity.      Cervical back: Normal range of motion and neck supple.      Right lower leg: Tenderness present. No  "edema.      Left lower leg: Tenderness present. No edema.        Legs:    Lymphadenopathy:      Cervical: No cervical adenopathy.   Skin:     General: Skin is warm and dry.   Neurological:      Mental Status: She is alert and oriented to person, place, and time. Mental status is at baseline.   Psychiatric:         Mood and Affect: Mood normal.         Thought Content: Thought content normal.         Laboratory Reviewed:     Lab Results   Component Value Date    WBC 7.50 07/17/2023    HGB 13.3 07/17/2023    HCT 41.2 07/17/2023     07/17/2023    CHOL 198 07/17/2023    TRIG 104 07/17/2023    HDL 55 07/17/2023    ALT 14 07/17/2023    AST 18 07/17/2023     07/17/2023    K 4.1 07/17/2023     07/17/2023    CREATININE 0.8 07/17/2023    BUN 18 07/17/2023    CO2 24 07/17/2023    TSH 2.473 07/17/2023    INR 1.0 06/07/2015    GLUF 89 08/06/2008    HGBA1C 5.2 07/17/2023       Screening or Prevention Patient's value Goal Complete/Controlled?   HgA1C Testing and Control   Lab Results   Component Value Date    HGBA1C 5.2 07/17/2023      Annually/Less than 8% Yes   Lipid profile : 07/17/2023 Annually Yes   LDL control Lab Results   Component Value Date    LDLCALC 122.2 07/17/2023    Annually/Less than 100 mg/dl  No   Nephropathy screening No results found for: "LABMICR"  Lab Results   Component Value Date    PROTEINUA Negative 05/23/2021    Annually No   Blood pressure BP Readings from Last 1 Encounters:   08/24/23 122/64    Less than 140/90 Yes   Dilated retinal exam Most Recent Eye Exam Date: Not Found Annually Yes   Foot exam   Most Recent Foot Exam Date: Not Found Annually Yes       Health Maintenance  Health Maintenance Topics with due status: Not Due       Topic Last Completion Date    Colorectal Cancer Screening 01/15/2015    TETANUS VACCINE 11/21/2016    Influenza Vaccine 01/12/2021    DEXA Scan 06/13/2022    Hemoglobin A1c (Diabetic Prevention Screening) 07/17/2023    Mammogram 07/17/2023    Lipid Panel " 07/17/2023     Health Maintenance Due   Topic Date Due    COVID-19 Vaccine (3 - Pfizer series) 10/15/2021       Assessment and Plan:  1. Mixed hyperlipidemia  Assessment & Plan:  Continue atorvastatin - monitor for symptoms    Orders:  -     Ambulatory referral/consult to Cardiology; Future; Expected date: 08/31/2023    2. Right hip pain  -     X-Ray Hip 2 or 3 views Right (with Pelvis when performed); Future; Expected date: 08/24/2023  -     ketorolac injection 30 mg    3. Elevated coronary artery calcium score  -     Ambulatory referral/consult to Cardiology; Future; Expected date: 08/31/2023    4. Sciatica of right side  -     Discontinue: ketorolac injection 30 mg  -     triamcinolone acetonide injection 60 mg  -     ketorolac injection 30 mg                 -Patient's lab results were reviewed and discussed with patient  -Treatment options and alternatives were discussed with the patient. Patient expressed understanding. Patient was given the opportunity to ask questions and be an active participant in their medical care. Patient had no further questions or concerns at this time.         Future Appointments   Date Time Provider Department Center   9/7/2023  9:00 AM Jayda Vega NP [facility]   9/14/2023  2:20 PM Eros Junior MD [facility] CARDIO [facility]   10/20/2023 10:55 AM LABORATORY, [facility] LAB [facility]   10/27/2023  9:40 AM Ofe Velez MD [facility]   4/16/2024 12:45 PM LABORATORY, [facility] LAB [facility]   4/16/2024  1:30 PM Dian Msaters PA-C [facility]   6/14/2024 12:30 PM [facility] BMD1: BONE DENSITY SCAN [facility] DEXABMD [facility]          After visit summary printed and given to patient upon discharge.  Patient goals and care plan are included in After visit summary.    Total medical decision making time was 54 min.    The following issues were discussed: The primary encounter diagnosis was Mixed hyperlipidemia. Diagnoses of Right hip pain,  Elevated coronary artery calcium score, and Sciatica of right side were also pertinent to this visit.    Health maintenance needs, recent test results and goals of care discussed with pt and questions answered.           JEFFREY Sanders, NP-C  [facility] [address] [place] [address]

## 2023-08-25 PROBLEM — M25.551 RIGHT HIP PAIN: Status: ACTIVE | Noted: 2023-08-25

## 2023-08-25 PROBLEM — R93.1 ELEVATED CORONARY ARTERY CALCIUM SCORE: Status: ACTIVE | Noted: 2023-08-25

## 2023-08-25 PROBLEM — M54.31 SCIATICA OF RIGHT SIDE: Status: ACTIVE | Noted: 2023-08-25

## 2023-09-13 DIAGNOSIS — M85.80 OSTEOPENIA WITH HIGH RISK OF FRACTURE: ICD-10-CM

## 2023-09-13 RX ORDER — ALENDRONATE SODIUM 70 MG/1
70 TABLET ORAL
Qty: 4 TABLET | Refills: 11 | Status: SHIPPED | OUTPATIENT
Start: 2023-09-13

## 2023-09-25 ENCOUNTER — OFFICE VISIT (OUTPATIENT)
Dept: PRIMARY CARE CLINIC | Facility: CLINIC | Age: 68
End: 2023-09-25
Payer: MEDICARE

## 2023-09-25 VITALS
DIASTOLIC BLOOD PRESSURE: 68 MMHG | WEIGHT: 177.13 LBS | SYSTOLIC BLOOD PRESSURE: 132 MMHG | TEMPERATURE: 98 F | OXYGEN SATURATION: 99 % | HEIGHT: 61 IN | HEART RATE: 69 BPM | BODY MASS INDEX: 33.44 KG/M2

## 2023-09-25 DIAGNOSIS — E78.2 MIXED HYPERLIPIDEMIA: Chronic | ICD-10-CM

## 2023-09-25 DIAGNOSIS — H35.3133 ADVANCED ATROPHIC NONEXUDATIVE AGE-RELATED MACULAR DEGENERATION OF BOTH EYES WITHOUT SUBFOVEAL INVOLVEMENT: ICD-10-CM

## 2023-09-25 DIAGNOSIS — M81.0 AGE-RELATED OSTEOPOROSIS WITHOUT CURRENT PATHOLOGICAL FRACTURE: ICD-10-CM

## 2023-09-25 DIAGNOSIS — E55.9 VITAMIN D DEFICIENCY: ICD-10-CM

## 2023-09-25 DIAGNOSIS — I77.1 TORTUOUS AORTA: ICD-10-CM

## 2023-09-25 DIAGNOSIS — E66.01 SEVERE OBESITY WITH BODY MASS INDEX (BMI) OF 35.0 TO 35.9 AND COMORBIDITY: ICD-10-CM

## 2023-09-25 DIAGNOSIS — F41.8 MIXED ANXIETY AND DEPRESSIVE DISORDER: ICD-10-CM

## 2023-09-25 DIAGNOSIS — Z00.00 ENCOUNTER FOR PREVENTIVE HEALTH EXAMINATION: Primary | ICD-10-CM

## 2023-09-25 DIAGNOSIS — H91.93 HEARING DIFFICULTY OF BOTH EARS: ICD-10-CM

## 2023-09-25 PROCEDURE — G0439 PR MEDICARE ANNUAL WELLNESS SUBSEQUENT VISIT: ICD-10-PCS | Mod: HCNC,S$GLB,, | Performed by: NURSE PRACTITIONER

## 2023-09-25 PROCEDURE — 90694 VACC AIIV4 NO PRSRV 0.5ML IM: CPT | Mod: HCNC,S$GLB,, | Performed by: NURSE PRACTITIONER

## 2023-09-25 PROCEDURE — 3008F BODY MASS INDEX DOCD: CPT | Mod: HCNC,CPTII,S$GLB, | Performed by: NURSE PRACTITIONER

## 2023-09-25 PROCEDURE — G0008 FLU VACCINE - QUADRIVALENT - ADJUVANTED: ICD-10-PCS | Mod: HCNC,S$GLB,, | Performed by: NURSE PRACTITIONER

## 2023-09-25 PROCEDURE — 3008F PR BODY MASS INDEX (BMI) DOCUMENTED: ICD-10-PCS | Mod: HCNC,CPTII,S$GLB, | Performed by: NURSE PRACTITIONER

## 2023-09-25 PROCEDURE — 3075F PR MOST RECENT SYSTOLIC BLOOD PRESS GE 130-139MM HG: ICD-10-PCS | Mod: HCNC,CPTII,S$GLB, | Performed by: NURSE PRACTITIONER

## 2023-09-25 PROCEDURE — 1159F MED LIST DOCD IN RCRD: CPT | Mod: HCNC,CPTII,S$GLB, | Performed by: NURSE PRACTITIONER

## 2023-09-25 PROCEDURE — 1159F PR MEDICATION LIST DOCUMENTED IN MEDICAL RECORD: ICD-10-PCS | Mod: HCNC,CPTII,S$GLB, | Performed by: NURSE PRACTITIONER

## 2023-09-25 PROCEDURE — 1101F PT FALLS ASSESS-DOCD LE1/YR: CPT | Mod: HCNC,CPTII,S$GLB, | Performed by: NURSE PRACTITIONER

## 2023-09-25 PROCEDURE — 1157F PR ADVANCE CARE PLAN OR EQUIV PRESENT IN MEDICAL RECORD: ICD-10-PCS | Mod: HCNC,CPTII,S$GLB, | Performed by: NURSE PRACTITIONER

## 2023-09-25 PROCEDURE — G0439 PPPS, SUBSEQ VISIT: HCPCS | Mod: HCNC,S$GLB,, | Performed by: NURSE PRACTITIONER

## 2023-09-25 PROCEDURE — 3288F PR FALLS RISK ASSESSMENT DOCUMENTED: ICD-10-PCS | Mod: HCNC,CPTII,S$GLB, | Performed by: NURSE PRACTITIONER

## 2023-09-25 PROCEDURE — 3044F PR MOST RECENT HEMOGLOBIN A1C LEVEL <7.0%: ICD-10-PCS | Mod: HCNC,CPTII,S$GLB, | Performed by: NURSE PRACTITIONER

## 2023-09-25 PROCEDURE — 1170F FXNL STATUS ASSESSED: CPT | Mod: HCNC,CPTII,S$GLB, | Performed by: NURSE PRACTITIONER

## 2023-09-25 PROCEDURE — G0008 ADMIN INFLUENZA VIRUS VAC: HCPCS | Mod: HCNC,S$GLB,, | Performed by: NURSE PRACTITIONER

## 2023-09-25 PROCEDURE — 3078F PR MOST RECENT DIASTOLIC BLOOD PRESSURE < 80 MM HG: ICD-10-PCS | Mod: HCNC,CPTII,S$GLB, | Performed by: NURSE PRACTITIONER

## 2023-09-25 PROCEDURE — 1157F ADVNC CARE PLAN IN RCRD: CPT | Mod: HCNC,CPTII,S$GLB, | Performed by: NURSE PRACTITIONER

## 2023-09-25 PROCEDURE — 3288F FALL RISK ASSESSMENT DOCD: CPT | Mod: HCNC,CPTII,S$GLB, | Performed by: NURSE PRACTITIONER

## 2023-09-25 PROCEDURE — 3075F SYST BP GE 130 - 139MM HG: CPT | Mod: HCNC,CPTII,S$GLB, | Performed by: NURSE PRACTITIONER

## 2023-09-25 PROCEDURE — 1101F PR PT FALLS ASSESS DOC 0-1 FALLS W/OUT INJ PAST YR: ICD-10-PCS | Mod: HCNC,CPTII,S$GLB, | Performed by: NURSE PRACTITIONER

## 2023-09-25 PROCEDURE — 1170F PR FUNCTIONAL STATUS ASSESSED: ICD-10-PCS | Mod: HCNC,CPTII,S$GLB, | Performed by: NURSE PRACTITIONER

## 2023-09-25 PROCEDURE — 99999 PR PBB SHADOW E&M-EST. PATIENT-LVL IV: CPT | Mod: PBBFAC,HCNC,, | Performed by: NURSE PRACTITIONER

## 2023-09-25 PROCEDURE — 3078F DIAST BP <80 MM HG: CPT | Mod: HCNC,CPTII,S$GLB, | Performed by: NURSE PRACTITIONER

## 2023-09-25 PROCEDURE — 99999 PR PBB SHADOW E&M-EST. PATIENT-LVL IV: ICD-10-PCS | Mod: PBBFAC,HCNC,, | Performed by: NURSE PRACTITIONER

## 2023-09-25 PROCEDURE — 90694 FLU VACCINE - QUADRIVALENT - ADJUVANTED: ICD-10-PCS | Mod: HCNC,S$GLB,, | Performed by: NURSE PRACTITIONER

## 2023-09-25 PROCEDURE — 3044F HG A1C LEVEL LT 7.0%: CPT | Mod: HCNC,CPTII,S$GLB, | Performed by: NURSE PRACTITIONER

## 2023-09-25 NOTE — ASSESSMENT & PLAN NOTE
FINDINGS:  The L1 to L4 vertebral bone mineral density is equal to 1.176 g/cm squared with a T score of -0.1.  There has been no significant change relative to the prior study.     The left femoral neck bone mineral density is equal to 0.862 g/cm squared with a T score of -1.3.  There has been  no significant change relative to the prior study.   There is a 23.7% risk of a major osteoporotic fracture and a 1.6% risk of hip fracture in the next 10 years (FRAX).   Impression:   Osteopenia  Assess and monitor  Continue Vit D and fosamax  F/U with Rheumatology or PCP

## 2023-09-25 NOTE — ASSESSMENT & PLAN NOTE
Latest Reference Range & Units Most Recent   Cholesterol 120 - 199 mg/dL 198  7/17/23 08:01   HDL 40 - 75 mg/dL 55  7/17/23 08:01   HDL/Cholesterol Ratio 20.0 - 50.0 % 27.8  7/17/23 08:01   LDL Cholesterol External 63.0 - 159.0 mg/dL 122.2  7/17/23 08:01   Non-HDL Cholesterol mg/dL 143  7/17/23 08:01   Total Cholesterol/HDL Ratio 2.0 - 5.0  3.6  7/17/23 08:01   Triglycerides 30 - 150 mg/dL 104  7/17/23 08:01   Assess and monitor  Continue atorvastatin  Increase frequency of exercise  F/U PCP

## 2023-09-25 NOTE — ASSESSMENT & PLAN NOTE
Up to date with     Review for Opioid Screening: Pt does not have Rx for Opioids      Review for Substance Use Disorders: Patient does not use illicit substances as reported

## 2023-09-25 NOTE — ASSESSMENT & PLAN NOTE
Assess and monitor  Increase exercise intensity and frequency  Active weight loss measures  F/U PCP

## 2023-09-25 NOTE — ASSESSMENT & PLAN NOTE
Chronic, stable  Increase activity level, low fat diet  Control BP and continue atorvastatin  F/U PCP

## 2023-09-25 NOTE — PATIENT INSTRUCTIONS
Counseling and Referral of Other Preventative  (Italic type indicates deductible and co-insurance are waived)    Patient Name: Mouna Chandra  Today's Date: 9/25/2023    Health Maintenance       Date Due Completion Date    COVID-19 Vaccine (3 - Pfizer series) 10/15/2021 8/20/2021    DEXA Scan 06/13/2024 6/13/2022    Override on 8/12/2013: Done (NORMAL with Dr. Finch)    Mammogram 07/17/2024 7/17/2023    Override on 8/12/2013: Done    Lipid Panel 07/17/2024 7/17/2023    Colorectal Cancer Screening 01/15/2025 1/15/2015    Override on 8/3/2006: Done (INternal Hemorrhoids o/w Normal with Dr. Munoz)    Hemoglobin A1c (Diabetic Prevention Screening) 07/17/2026 7/17/2023    TETANUS VACCINE 11/21/2026 11/21/2016        Orders Placed This Encounter   Procedures    Influenza - Quadrivalent (Adjuvanted)    Ambulatory referral/consult to Audiology     The following information is provided to all patients.  This information is to help you find resources for any of the problems found today that may be affecting your health:                Living healthy guide: www.[facility].[place].gov      Understanding Diabetes: www.diabetes.org      Eating healthy: www.cdc.gov/healthyweight      CDC home safety checklist: www.cdc.gov/steadi/patient.html      Agency on Aging: www.goea.[place].[facility]      Alcoholics anonymous (AA): www.aa.org      Physical Activity: www.diaz.nih.gov/sa7hpxo      Tobacco use: www.quitwithusla.org

## 2023-09-25 NOTE — PROGRESS NOTES
"  Mouna Chandra presented for an initial Medicare AWV today. The following components were reviewed and updated:    Medical history  Family History  Social history  Allergies and Current Medications  Health Risk Assessment  Health Maintenance  Care Team    **See Completed Assessments for Annual Wellness visit with in the encounter summary    The following assessments were completed:  Depression Screening  Cognitive function Screening  Timed Get Up Test  Whisper Test  Nutrition Screening    Vitals:    09/25/23 1105   BP: 132/68   BP Location: Left arm   Pulse: 69   Temp: 98 °F (36.7 °C)   TempSrc: Oral   SpO2: 99%   Weight: 80.3 kg (177 lb 1.6 oz)   Height: 5' 1" (1.549 m)     Body mass index is 33.46 kg/m².            Review of Systems   Constitutional:  Negative for activity change, appetite change, chills, fatigue and fever.   HENT:  Negative for congestion, ear pain, hearing loss, postnasal drip, rhinorrhea, sore throat and trouble swallowing.    Eyes:  Negative for pain and visual disturbance.   Respiratory:  Negative for cough, shortness of breath and wheezing.    Cardiovascular:  Negative for chest pain, palpitations and leg swelling.   Gastrointestinal:  Negative for abdominal pain, blood in stool, constipation, diarrhea, nausea and vomiting.   Genitourinary:  Negative for difficulty urinating, dysuria, frequency and hematuria.   Musculoskeletal:  Positive for back pain and neck pain. Negative for arthralgias and myalgias.   Skin:  Negative for rash and wound.   Neurological:  Positive for numbness. Negative for dizziness, speech difficulty, weakness, light-headedness and headaches.   Psychiatric/Behavioral:  Negative for confusion, dysphoric mood and sleep disturbance. The patient is not nervous/anxious.            Physical Exam  Vitals reviewed.   Constitutional:       General: She is not in acute distress.     Appearance: Normal appearance.   HENT:      Head: Normocephalic and atraumatic.      Nose: Nose " normal.      Mouth/Throat:      Mouth: Mucous membranes are moist.   Eyes:      General: No scleral icterus.     Conjunctiva/sclera: Conjunctivae normal.   Cardiovascular:      Rate and Rhythm: Normal rate and regular rhythm.      Heart sounds: No murmur heard.  Pulmonary:      Effort: Pulmonary effort is normal. No respiratory distress.      Breath sounds: Normal breath sounds.   Abdominal:      Palpations: Abdomen is soft. There is no mass.      Tenderness: There is no abdominal tenderness.   Musculoskeletal:         General: No swelling or deformity.      Cervical back: Normal range of motion and neck supple.      Right lower leg: No edema.      Left lower leg: No edema.   Lymphadenopathy:      Cervical: No cervical adenopathy.   Skin:     General: Skin is warm and dry.   Neurological:      Mental Status: She is alert and oriented to person, place, and time. Mental status is at baseline.   Psychiatric:         Mood and Affect: Mood normal.         Thought Content: Thought content normal.            Health Maintenance         Date Due Completion Date    COVID-19 Vaccine (3 - Pfizer series) 10/15/2021 8/20/2021    DEXA Scan 06/13/2024 6/13/2022    Override on 8/12/2013: Done (NORMAL with Dr. Finch)    Mammogram 07/17/2024 7/17/2023    Override on 8/12/2013: Done    Lipid Panel 07/17/2024 7/17/2023    Colorectal Cancer Screening 01/15/2025 1/15/2015    Override on 8/3/2006: Done (INternal Hemorrhoids o/w Normal with Dr. Munoz)    Hemoglobin A1c (Diabetic Prevention Screening) 07/17/2026 7/17/2023    TETANUS VACCINE 11/21/2026 11/21/2016              PROBLEM LIST ITEMS ADDRESSED THIS VISIT:    1. Encounter for preventive health examination  Assessment & Plan:  Up to date with     Review for Opioid Screening: Pt does not have Rx for Opioids      Review for Substance Use Disorders: Patient does not use illicit substances as reported       2. Advanced atrophic nonexudative age-related macular degeneration  of both eyes without subfoveal involvement  Assessment & Plan:  Assess and monitor  Yearly with Ophthalmology  Retina scan - progression stable        3. Age-related osteoporosis without current pathological fracture  Assessment & Plan:  FINDINGS:  The L1 to L4 vertebral bone mineral density is equal to 1.176 g/cm squared with a T score of -0.1.  There has been no significant change relative to the prior study.     The left femoral neck bone mineral density is equal to 0.862 g/cm squared with a T score of -1.3.  There has been  no significant change relative to the prior study.   There is a 23.7% risk of a major osteoporotic fracture and a 1.6% risk of hip fracture in the next 10 years (FRAX).   Impression:   Osteopenia  Assess and monitor  Continue Vit D and fosamax  F/U with Rheumatology or PCP        4. Mixed anxiety and depressive disorder  Assessment & Plan:  Chronic, stable  Continue Paxil and Buspar  F/U PcP      5. Mixed hyperlipidemia  Assessment & Plan:   Latest Reference Range & Units Most Recent   Cholesterol 120 - 199 mg/dL 198  7/17/23 08:01   HDL 40 - 75 mg/dL 55  7/17/23 08:01   HDL/Cholesterol Ratio 20.0 - 50.0 % 27.8  7/17/23 08:01   LDL Cholesterol External 63.0 - 159.0 mg/dL 122.2  7/17/23 08:01   Non-HDL Cholesterol mg/dL 143  7/17/23 08:01   Total Cholesterol/HDL Ratio 2.0 - 5.0  3.6  7/17/23 08:01   Triglycerides 30 - 150 mg/dL 104  7/17/23 08:01   Assess and monitor  Continue atorvastatin  Increase frequency of exercise  F/U PCP      6. Severe obesity with body mass index (BMI) of 35.0 to 35.9 and comorbidity  Assessment & Plan:  Assess and monitor  Increase exercise intensity and frequency  Active weight loss measures  F/U PCP      7. Tortuous aorta  Assessment & Plan:  Chronic, stable  Increase activity level, low fat diet  Control BP and continue atorvastatin  F/U PCP          8. Vitamin D deficiency  Assessment & Plan:  7 mo ago 11 mo ago 2 yr ago 3 yr ago 5 yr ago 6 yr ago 7 yr ago   Vit  D, 25-Hydroxy 30 - 96 ng/mL 48  48 CM  56 CM  23 Low     Continue cholecalciferol  Brief exposure to sunlight  F/U PCP      9. Hearing difficulty of both ears  -     Ambulatory referral/consult to Audiology; Future; Expected date: 10/02/2023    Other orders  -     Influenza - Quadrivalent (Adjuvanted)          Provided Mouna with a 5-10 year written screening schedule and personal prevention plan. Recommendations were developed using the USPSTF age appropriate recommendations. Education, counseling, and referrals were provided as needed.  After Visit Summary printed and given to patient which includes a list of additional screenings\tests needed.    Future Appointments   Date Time Provider Department Center   10/20/2023 10:55 AM LABORATORY, [facility] LAB [facility]   10/27/2023  9:40 AM Ofe Velez MD BS [id number]   4/16/2024 12:45 PM LABORATORY, [facility] HG LAB [facility]   4/16/2024  1:30 PM Dian Masters PA-C [facility] RHEUM [facility]   6/14/2024 12:30 PM [facility] BMD1: BONE DENSITY SCAN [facility] DEXABMD [facility]          Jayda Vega, APRN, NP-C  [facility] [address], [place] [address]    I offered to discuss advanced care planning, including how to pick a person who would make decisions for you if you were unable to make them for yourself, called a health care power of , and what kind of decisions you might make such as use of life sustaining treatments such as ventilators and tube feeding when faced with a life limiting illness recorded on a living will that they will need to know. (How you want to be cared for as you near the end of your natural life)     X Patient is interested in learning more about how to make advanced directives.  I provided them paperwork and offered to discuss this with them.

## 2023-09-25 NOTE — ASSESSMENT & PLAN NOTE
7 mo ago 11 mo ago 2 yr ago 3 yr ago 5 yr ago 6 yr ago 7 yr ago   Vit D, 25-Hydroxy 30 - 96 ng/mL 48  48 CM  56 CM  23 Low     Continue cholecalciferol  Brief exposure to sunlight  F/U PCP

## 2023-10-04 ENCOUNTER — OFFICE VISIT (OUTPATIENT)
Dept: OPHTHALMOLOGY | Facility: CLINIC | Age: 68
End: 2023-10-04
Payer: MEDICARE

## 2023-10-04 ENCOUNTER — PATIENT MESSAGE (OUTPATIENT)
Dept: OPHTHALMOLOGY | Facility: CLINIC | Age: 68
End: 2023-10-04

## 2023-10-04 DIAGNOSIS — S05.01XA ABRASION OF RIGHT CORNEA, INITIAL ENCOUNTER: Primary | ICD-10-CM

## 2023-10-04 PROCEDURE — 92071 PR CONTACT LENS FITTING FOR TX: ICD-10-PCS | Mod: HCNC,RT,S$GLB, | Performed by: OPTOMETRIST

## 2023-10-04 PROCEDURE — 1157F ADVNC CARE PLAN IN RCRD: CPT | Mod: HCNC,CPTII,S$GLB, | Performed by: OPTOMETRIST

## 2023-10-04 PROCEDURE — 99999 PR PBB SHADOW E&M-EST. PATIENT-LVL III: ICD-10-PCS | Mod: PBBFAC,HCNC,, | Performed by: OPTOMETRIST

## 2023-10-04 PROCEDURE — 3044F HG A1C LEVEL LT 7.0%: CPT | Mod: HCNC,CPTII,S$GLB, | Performed by: OPTOMETRIST

## 2023-10-04 PROCEDURE — 1159F MED LIST DOCD IN RCRD: CPT | Mod: HCNC,CPTII,S$GLB, | Performed by: OPTOMETRIST

## 2023-10-04 PROCEDURE — 99999 PR PBB SHADOW E&M-EST. PATIENT-LVL III: CPT | Mod: PBBFAC,HCNC,, | Performed by: OPTOMETRIST

## 2023-10-04 PROCEDURE — 1159F PR MEDICATION LIST DOCUMENTED IN MEDICAL RECORD: ICD-10-PCS | Mod: HCNC,CPTII,S$GLB, | Performed by: OPTOMETRIST

## 2023-10-04 PROCEDURE — 99213 OFFICE O/P EST LOW 20 MIN: CPT | Mod: HCNC,S$GLB,, | Performed by: OPTOMETRIST

## 2023-10-04 PROCEDURE — 3044F PR MOST RECENT HEMOGLOBIN A1C LEVEL <7.0%: ICD-10-PCS | Mod: HCNC,CPTII,S$GLB, | Performed by: OPTOMETRIST

## 2023-10-04 PROCEDURE — 99213 PR OFFICE/OUTPT VISIT, EST, LEVL III, 20-29 MIN: ICD-10-PCS | Mod: HCNC,S$GLB,, | Performed by: OPTOMETRIST

## 2023-10-04 PROCEDURE — 92071 CONTACT LENS FITTING FOR TX: CPT | Mod: HCNC,RT,S$GLB, | Performed by: OPTOMETRIST

## 2023-10-04 PROCEDURE — 1157F PR ADVANCE CARE PLAN OR EQUIV PRESENT IN MEDICAL RECORD: ICD-10-PCS | Mod: HCNC,CPTII,S$GLB, | Performed by: OPTOMETRIST

## 2023-10-04 RX ORDER — MOXIFLOXACIN 5 MG/ML
1 SOLUTION/ DROPS OPHTHALMIC 4 TIMES DAILY
Qty: 3 ML | Refills: 0 | Status: SHIPPED | OUTPATIENT
Start: 2023-10-04 | End: 2023-10-11

## 2023-10-04 NOTE — PROGRESS NOTES
HPI     Eye Problem            Comments: Pain Scale:  10  Onset:   last night  OD, OS, OU:   OD  Discharge:   yes  A.M. Matting:  no  Itch:   no  Redness:   yes  Photophobia:   no  Foreign body sensation:   yes  Deep pain:   yes very painful  Previous occurrence:   no  Drops:   no, just eyewash   Pt states so painful, she is getting a headache  Pt states when eye is closed, there is a stabbing feeling         Last edited by Marbella Medina on 10/4/2023  2:15 PM.            Assessment /Plan     For exam results, see Encounter Report.    Abrasion of right cornea, initial encounter  -     moxifloxacin (VIGAMOX) 0.5 % ophthalmic solution; Place 1 drop into the right eye 4 (four) times daily. for 7 days  Dispense: 3 mL; Refill: 0    Eye pain improved upon proparacaine instillation. Bctl fitted today for comfort, start prophylactic moxi qid.     RTC 1 day for follow up with BCTL removal and corneal follow up.                      
none

## 2023-10-05 ENCOUNTER — DOCUMENTATION ONLY (OUTPATIENT)
Dept: PRIMARY CARE CLINIC | Facility: CLINIC | Age: 68
End: 2023-10-05
Payer: MEDICARE

## 2023-10-05 ENCOUNTER — OFFICE VISIT (OUTPATIENT)
Dept: OPHTHALMOLOGY | Facility: CLINIC | Age: 68
End: 2023-10-05
Payer: MEDICARE

## 2023-10-05 DIAGNOSIS — S05.01XD ABRASION OF RIGHT CORNEA, SUBSEQUENT ENCOUNTER: Primary | ICD-10-CM

## 2023-10-05 PROCEDURE — 1157F ADVNC CARE PLAN IN RCRD: CPT | Mod: HCNC,CPTII,S$GLB, | Performed by: OPTOMETRIST

## 2023-10-05 PROCEDURE — 3044F HG A1C LEVEL LT 7.0%: CPT | Mod: HCNC,CPTII,S$GLB, | Performed by: OPTOMETRIST

## 2023-10-05 PROCEDURE — 99213 OFFICE O/P EST LOW 20 MIN: CPT | Mod: HCNC,S$GLB,, | Performed by: OPTOMETRIST

## 2023-10-05 PROCEDURE — 3044F PR MOST RECENT HEMOGLOBIN A1C LEVEL <7.0%: ICD-10-PCS | Mod: HCNC,CPTII,S$GLB, | Performed by: OPTOMETRIST

## 2023-10-05 PROCEDURE — 1157F PR ADVANCE CARE PLAN OR EQUIV PRESENT IN MEDICAL RECORD: ICD-10-PCS | Mod: HCNC,CPTII,S$GLB, | Performed by: OPTOMETRIST

## 2023-10-05 PROCEDURE — 99999 PR PBB SHADOW E&M-EST. PATIENT-LVL III: ICD-10-PCS | Mod: PBBFAC,HCNC,, | Performed by: OPTOMETRIST

## 2023-10-05 PROCEDURE — 1159F PR MEDICATION LIST DOCUMENTED IN MEDICAL RECORD: ICD-10-PCS | Mod: HCNC,CPTII,S$GLB, | Performed by: OPTOMETRIST

## 2023-10-05 PROCEDURE — 1159F MED LIST DOCD IN RCRD: CPT | Mod: HCNC,CPTII,S$GLB, | Performed by: OPTOMETRIST

## 2023-10-05 PROCEDURE — 99213 PR OFFICE/OUTPT VISIT, EST, LEVL III, 20-29 MIN: ICD-10-PCS | Mod: HCNC,S$GLB,, | Performed by: OPTOMETRIST

## 2023-10-05 PROCEDURE — 99999 PR PBB SHADOW E&M-EST. PATIENT-LVL III: CPT | Mod: PBBFAC,HCNC,, | Performed by: OPTOMETRIST

## 2023-10-05 NOTE — PROGRESS NOTES
Received a message from patient requesting appointment. Scheduled for Thursday, October 12th at 8a.m.  Left voicemail on patient's phone and sent My Chart message also.

## 2023-10-05 NOTE — PROGRESS NOTES
HPI     Follow-up            Comments: Pt is compliant with using prophylactic moxi qid.      RTC 1 day for follow up with BCTL removal and corneal follow up.   Pt states pain is going away         Last edited by Marbella Medina on 10/5/2023  2:33 PM.            Assessment /Plan     For exam results, see Encounter Report.    Abrasion of right cornea, subsequent encounter    Mostly resolved, bctl removed today. Continue prophylactic moxi qid x 6 days. Discussed OTC PFAT PRN for dryness.     RTC w/ Dr Tobin as scheduled for annual eye exam, sooner if any changes to vision worsening symptoms.

## 2023-10-13 ENCOUNTER — PATIENT MESSAGE (OUTPATIENT)
Dept: PRIMARY CARE CLINIC | Facility: CLINIC | Age: 68
End: 2023-10-13
Payer: MEDICARE

## 2023-10-19 NOTE — PROGRESS NOTES
Subjective:      Patient ID: Mouna Chandra is a 68 y.o. female.    Chief Complaint: Follow-up (3 month f/u pt states she has left shoulder pain)      HPI  Here for follow up of medical problems.  Down 7# more, total 14#.  Cutting out sugars.  Active in yard daily.    Depression doing well, to meet counselor soon.  Taking buspar about once a week.  Cards to consider if high CT Ca score 754, needs [facility].        The 10-year ASCVD risk score (Bernarda WILKERSON, et al., 2019) is: 6.2%    Values used to calculate the score:      Age: 68 years      Sex: Female      Is Non- : No      Diabetic: No      Tobacco smoker: No      Systolic Blood Pressure: 128 mmHg      Is BP treated: No      HDL Cholesterol: 51 mg/dL      Total Cholesterol: 146 mg/dL    7/23 CT calcium score:  SCORE SUMMARY     Your total calcium score is 754     Incidental findings: Subtle bibasilar presumed hypoaeration changes.  Right mid and lower lung sub 6 mm pulmonary nodules     Impression:     Your total calcium score is 754.  High coronary heart disease risk. This report was flagged in Epic as abnormal.     Multiple right lung sub 6 mm pulmonary nodules.  Consider diagnostic CT chest for complete evaluation.        Electronically signed by: Robby Olivares MD  Date:                                            07/27/2023  Time  -----------------------------------------------------------------------------            Updated/ annual due 7/24:  HM: 9/23 fluvax, 3/17 [id number], 6/21 [id number], 11/16 TDaP, 10/16 zostavax, 6/21 Shingrix x2, 6/22 BMD rep 2y, 1/15 Cscope rep due 10y, 7/23 MMG, 1/18 Eye Dr. Tobin, 5/21 HCV neg.        Review of Systems   Constitutional:  Negative for chills, diaphoresis and fever.   Respiratory:  Negative for cough and shortness of breath.    Cardiovascular:  Negative for chest pain, palpitations and leg swelling.   Gastrointestinal:  Negative for blood in stool, constipation, diarrhea, nausea and  "vomiting.   Genitourinary:  Negative for dysuria, frequency and hematuria.   Psychiatric/Behavioral:  The patient is not nervous/anxious.          Objective:   /62 (BP Location: Left arm)   Pulse 82   Temp 98.2 °F (36.8 °C) (Oral)   Ht 5' 1" (1.549 m)   Wt 79.3 kg (174 lb 12.8 oz)   SpO2 99%   BMI 33.03 kg/m²     Physical Exam  Constitutional:       Appearance: She is well-developed.   Neck:      Thyroid: No thyroid mass.      Vascular: No carotid bruit.   Cardiovascular:      Rate and Rhythm: Normal rate and regular rhythm.      Heart sounds: No murmur heard.     No friction rub. No gallop.   Pulmonary:      Effort: Pulmonary effort is normal.      Breath sounds: Normal breath sounds. No wheezing or rales.   Abdominal:      General: Bowel sounds are normal.      Palpations: Abdomen is soft. There is no mass.      Tenderness: There is no abdominal tenderness.   Musculoskeletal:      Cervical back: Neck supple.   Lymphadenopathy:      Cervical: No cervical adenopathy.   Neurological:      Mental Status: She is alert and oriented to person, place, and time.          Latest Reference Range & Units 07/17/23 08:01 10/20/23 08:25   ALT 10 - 44 U/L 14 17   Cholesterol Total 120 - 199 mg/dL 198 146   HDL 40 - 75 mg/dL 55 51   HDL/Cholesterol Ratio 20.0 - 50.0 % 27.8 34.9   LDL Cholesterol External 63.0 - 159.0 mg/dL 122.2 80.6   Non-HDL Cholesterol mg/dL 143 95   Total Cholesterol/HDL Ratio 2.0 - 5.0  3.6 2.9   Triglycerides 30 - 150 mg/dL 104 72       Assessment:       1. Elevated coronary artery calcium score    2. Mixed hyperlipidemia    3. Recurrent major depressive disorder, in partial remission          Plan:     Elevated coronary artery calcium score, Mixed hyperlipidemia- cont statin/asa/exercise/wt loss.  Poss LHC.    Recurrent major depressive disorder, in partial remission- doing well, start counseling.    RTC 3mo.        "

## 2023-10-20 ENCOUNTER — OFFICE VISIT (OUTPATIENT)
Dept: CARDIOLOGY | Facility: CLINIC | Age: 68
End: 2023-10-20
Payer: MEDICARE

## 2023-10-20 ENCOUNTER — PATIENT MESSAGE (OUTPATIENT)
Dept: CARDIOLOGY | Facility: HOSPITAL | Age: 68
End: 2023-10-20
Payer: MEDICARE

## 2023-10-20 ENCOUNTER — LAB VISIT (OUTPATIENT)
Dept: LAB | Facility: HOSPITAL | Age: 68
End: 2023-10-20
Attending: INTERNAL MEDICINE
Payer: MEDICARE

## 2023-10-20 VITALS
HEIGHT: 61 IN | OXYGEN SATURATION: 97 % | SYSTOLIC BLOOD PRESSURE: 132 MMHG | DIASTOLIC BLOOD PRESSURE: 76 MMHG | WEIGHT: 177.25 LBS | BODY MASS INDEX: 33.47 KG/M2 | HEART RATE: 79 BPM

## 2023-10-20 DIAGNOSIS — E66.01 SEVERE OBESITY WITH BODY MASS INDEX (BMI) OF 35.0 TO 35.9 AND COMORBIDITY: ICD-10-CM

## 2023-10-20 DIAGNOSIS — I25.10 CORONARY ARTERY CALCIFICATION SEEN ON CT SCAN: ICD-10-CM

## 2023-10-20 DIAGNOSIS — M51.9 LUMBAR DISC DISEASE: ICD-10-CM

## 2023-10-20 DIAGNOSIS — I77.1 TORTUOUS AORTA: ICD-10-CM

## 2023-10-20 DIAGNOSIS — E78.2 MIXED HYPERLIPIDEMIA: Chronic | ICD-10-CM

## 2023-10-20 DIAGNOSIS — M79.604 RIGHT LEG PAIN: ICD-10-CM

## 2023-10-20 DIAGNOSIS — R93.1 ELEVATED CORONARY ARTERY CALCIUM SCORE: Primary | ICD-10-CM

## 2023-10-20 DIAGNOSIS — R00.2 PALPITATIONS: ICD-10-CM

## 2023-10-20 DIAGNOSIS — R53.83 FATIGUE, UNSPECIFIED TYPE: ICD-10-CM

## 2023-10-20 DIAGNOSIS — R06.09 DOE (DYSPNEA ON EXERTION): ICD-10-CM

## 2023-10-20 DIAGNOSIS — R07.89 ATYPICAL CHEST PAIN: ICD-10-CM

## 2023-10-20 LAB
ALT SERPL W/O P-5'-P-CCNC: 17 U/L (ref 10–44)
CHOLEST SERPL-MCNC: 146 MG/DL (ref 120–199)
CHOLEST/HDLC SERPL: 2.9 {RATIO} (ref 2–5)
HDLC SERPL-MCNC: 51 MG/DL (ref 40–75)
HDLC SERPL: 34.9 % (ref 20–50)
LDLC SERPL CALC-MCNC: 80.6 MG/DL (ref 63–159)
NONHDLC SERPL-MCNC: 95 MG/DL
TRIGL SERPL-MCNC: 72 MG/DL (ref 30–150)

## 2023-10-20 PROCEDURE — 3044F HG A1C LEVEL LT 7.0%: CPT | Mod: HCNC,CPTII,S$GLB, | Performed by: PHYSICIAN ASSISTANT

## 2023-10-20 PROCEDURE — 1125F AMNT PAIN NOTED PAIN PRSNT: CPT | Mod: HCNC,CPTII,S$GLB, | Performed by: PHYSICIAN ASSISTANT

## 2023-10-20 PROCEDURE — 99214 PR OFFICE/OUTPT VISIT, EST, LEVL IV, 30-39 MIN: ICD-10-PCS | Mod: HCNC,S$GLB,, | Performed by: PHYSICIAN ASSISTANT

## 2023-10-20 PROCEDURE — 1101F PT FALLS ASSESS-DOCD LE1/YR: CPT | Mod: HCNC,CPTII,S$GLB, | Performed by: PHYSICIAN ASSISTANT

## 2023-10-20 PROCEDURE — 3078F DIAST BP <80 MM HG: CPT | Mod: HCNC,CPTII,S$GLB, | Performed by: PHYSICIAN ASSISTANT

## 2023-10-20 PROCEDURE — 99999 PR PBB SHADOW E&M-EST. PATIENT-LVL IV: CPT | Mod: PBBFAC,HCNC,, | Performed by: PHYSICIAN ASSISTANT

## 2023-10-20 PROCEDURE — 80061 LIPID PANEL: CPT | Mod: HCNC | Performed by: INTERNAL MEDICINE

## 2023-10-20 PROCEDURE — 3008F PR BODY MASS INDEX (BMI) DOCUMENTED: ICD-10-PCS | Mod: HCNC,CPTII,S$GLB, | Performed by: PHYSICIAN ASSISTANT

## 2023-10-20 PROCEDURE — 3075F SYST BP GE 130 - 139MM HG: CPT | Mod: HCNC,CPTII,S$GLB, | Performed by: PHYSICIAN ASSISTANT

## 2023-10-20 PROCEDURE — 84460 ALANINE AMINO (ALT) (SGPT): CPT | Mod: HCNC | Performed by: INTERNAL MEDICINE

## 2023-10-20 PROCEDURE — 99999 PR PBB SHADOW E&M-EST. PATIENT-LVL IV: ICD-10-PCS | Mod: PBBFAC,HCNC,, | Performed by: PHYSICIAN ASSISTANT

## 2023-10-20 PROCEDURE — 99214 OFFICE O/P EST MOD 30 MIN: CPT | Mod: HCNC,S$GLB,, | Performed by: PHYSICIAN ASSISTANT

## 2023-10-20 PROCEDURE — 1101F PR PT FALLS ASSESS DOC 0-1 FALLS W/OUT INJ PAST YR: ICD-10-PCS | Mod: HCNC,CPTII,S$GLB, | Performed by: PHYSICIAN ASSISTANT

## 2023-10-20 PROCEDURE — 3288F PR FALLS RISK ASSESSMENT DOCUMENTED: ICD-10-PCS | Mod: HCNC,CPTII,S$GLB, | Performed by: PHYSICIAN ASSISTANT

## 2023-10-20 PROCEDURE — 3075F PR MOST RECENT SYSTOLIC BLOOD PRESS GE 130-139MM HG: ICD-10-PCS | Mod: HCNC,CPTII,S$GLB, | Performed by: PHYSICIAN ASSISTANT

## 2023-10-20 PROCEDURE — 1159F PR MEDICATION LIST DOCUMENTED IN MEDICAL RECORD: ICD-10-PCS | Mod: HCNC,CPTII,S$GLB, | Performed by: PHYSICIAN ASSISTANT

## 2023-10-20 PROCEDURE — 36415 COLL VENOUS BLD VENIPUNCTURE: CPT | Mod: HCNC | Performed by: INTERNAL MEDICINE

## 2023-10-20 PROCEDURE — 1125F PR PAIN SEVERITY QUANTIFIED, PAIN PRESENT: ICD-10-PCS | Mod: HCNC,CPTII,S$GLB, | Performed by: PHYSICIAN ASSISTANT

## 2023-10-20 PROCEDURE — 1157F PR ADVANCE CARE PLAN OR EQUIV PRESENT IN MEDICAL RECORD: ICD-10-PCS | Mod: HCNC,CPTII,S$GLB, | Performed by: PHYSICIAN ASSISTANT

## 2023-10-20 PROCEDURE — 3044F PR MOST RECENT HEMOGLOBIN A1C LEVEL <7.0%: ICD-10-PCS | Mod: HCNC,CPTII,S$GLB, | Performed by: PHYSICIAN ASSISTANT

## 2023-10-20 PROCEDURE — 3288F FALL RISK ASSESSMENT DOCD: CPT | Mod: HCNC,CPTII,S$GLB, | Performed by: PHYSICIAN ASSISTANT

## 2023-10-20 PROCEDURE — 1159F MED LIST DOCD IN RCRD: CPT | Mod: HCNC,CPTII,S$GLB, | Performed by: PHYSICIAN ASSISTANT

## 2023-10-20 PROCEDURE — 1157F ADVNC CARE PLAN IN RCRD: CPT | Mod: HCNC,CPTII,S$GLB, | Performed by: PHYSICIAN ASSISTANT

## 2023-10-20 PROCEDURE — 3008F BODY MASS INDEX DOCD: CPT | Mod: HCNC,CPTII,S$GLB, | Performed by: PHYSICIAN ASSISTANT

## 2023-10-20 PROCEDURE — 3078F PR MOST RECENT DIASTOLIC BLOOD PRESSURE < 80 MM HG: ICD-10-PCS | Mod: HCNC,CPTII,S$GLB, | Performed by: PHYSICIAN ASSISTANT

## 2023-10-20 NOTE — PROGRESS NOTES
Subjective:   Patient ID:  Mouna Chandra is a 68 y.o. female who presents for follow-up of abnormal cardiac cTA      HPI  Ms. Chandra is a 68 year old female whose current medical conditions include hyperlipidemia, panic attacks, obesity, and strong familial history of CAD who presents today for follow-up. Patient last seen by Dr. Sheehan on 10/7/2020. She was referred back to our clinic due to abnormal coronary CTA, coronary calcium score 754. State she feels ok today in clinic. Main issue is right leg/hip pain, more bothersome over past 3 weeks. She also has some associated ankle numbness/sensation. No sores, ulcerations, or discoloration. Feels different from her typical sciatica pain. Denies krystina exertional chest pain, heaviness, or tightness, but does admit to CORRIGAN. States she has to stop and rest fairly often with activity. Feels like she does not have much get up and go. Will occasionally get a flutter like discomfort in the middle of her chest, typically at rest and short-lived. Will sometimes radiate to her back. No LH, dizziness, near syncope, or syncope. No edema. BP in normal range, no on any medications. Recently had statin switched to higher potency, repeat lipid panel pending.    She has very strong history of familial CAD. Father with MI in his 60's. Brother with CABG in his 50's. She is compliant with her medications, not taking ASA but will start.    Reviewed actual CT imaging--appears to have significant calcification in LAD, will have Dr. Sheehan review.        Review of Systems   Constitutional: Positive for malaise/fatigue. Negative for chills, decreased appetite and fever.   HENT:  Negative for congestion, hoarse voice and sore throat.    Eyes:  Negative for blurred vision and discharge.   Cardiovascular:  Positive for chest pain (atypical at rest) and dyspnea on exertion. Negative for claudication, cyanosis, irregular heartbeat, leg swelling, near-syncope, orthopnea, palpitations  "and paroxysmal nocturnal dyspnea.   Respiratory:  Positive for shortness of breath. Negative for cough, hemoptysis, snoring, sputum production and wheezing.    Endocrine: Negative for cold intolerance and heat intolerance.   Hematologic/Lymphatic: Negative for bleeding problem. Does not bruise/bleed easily.   Skin:  Negative for rash.   Musculoskeletal:  Positive for arthritis and joint pain (right leg). Negative for back pain, joint swelling, muscle cramps, muscle weakness and myalgias.   Gastrointestinal:  Negative for abdominal pain, constipation, diarrhea, heartburn, melena and nausea.   Genitourinary:  Negative for hematuria.   Neurological:  Positive for numbness. Negative for dizziness, focal weakness, headaches, light-headedness, loss of balance, paresthesias, seizures and weakness.   Psychiatric/Behavioral:  Negative for memory loss. The patient does not have insomnia.    Allergic/Immunologic: Negative for hives.     /76 (BP Location: Right arm, Patient Position: Sitting, BP Method: Large (Manual))   Pulse 79   Ht 5' 1" (1.549 m)   Wt 80.4 kg (177 lb 4 oz)   SpO2 97%   BMI 33.49 kg/m²     Objective:   Physical Exam  Vitals and nursing note reviewed.   Constitutional:       General: She is not in acute distress.     Appearance: Normal appearance. She is well-developed. She is obese. She is not diaphoretic.   HENT:      Head: Normocephalic and atraumatic.   Eyes:      General:         Right eye: No discharge.         Left eye: No discharge.      Pupils: Pupils are equal, round, and reactive to light.   Cardiovascular:      Rate and Rhythm: Normal rate and regular rhythm.      Heart sounds: Normal heart sounds, S1 normal and S2 normal. No murmur heard.  Pulmonary:      Effort: Pulmonary effort is normal. No respiratory distress.      Breath sounds: Normal breath sounds. No wheezing or rales.   Abdominal:      General: There is no distension.      Palpations: Abdomen is soft.      Tenderness: There " is no rebound.   Musculoskeletal:      Right lower leg: No edema.      Left lower leg: No edema.   Skin:     General: Skin is warm and dry.      Findings: No erythema.      Comments: Right foot warm neurovascularly intact  Able to wiggle toes  Palpable femoral pulse  Dopplerable PT and DP   Neurological:      General: No focal deficit present.      Mental Status: She is alert and oriented to person, place, and time.   Psychiatric:         Mood and Affect: Mood normal.         Behavior: Behavior normal.         Thought Content: Thought content normal.     Dictation #1  MRN:[id number]  CSN:[id number]   Stress Echo Results 9/24/20  Summary    There were no arrhythmias during stress.  The test was stopped because the patient experienced fatigue and shortness of breath.  There is left ventricular concentric remodeling.  With normal systolic function. The estimated ejection fraction is 60%.  Normal left ventricular diastolic function.  Normal right ventricular systolic function.  The stress echo portion of this study is negative for myocardial ischemia.  The ECG portion of this study is negative for myocardial ischemia.      Chemistry        Component Value Date/Time     07/17/2023 0801    K 4.1 07/17/2023 0801     07/17/2023 0801    CO2 24 07/17/2023 0801    BUN 18 07/17/2023 0801    CREATININE 0.8 07/17/2023 0801    GLU 91 07/17/2023 0801        Component Value Date/Time    CALCIUM 9.2 07/17/2023 0801    ALKPHOS 58 07/17/2023 0801    AST 18 07/17/2023 0801    ALT 14 07/17/2023 0801    BILITOT 0.4 07/17/2023 0801    ESTGFRAFRICA >60.0 05/11/2022 0818    ESTGFRAFRICA >60.0 05/11/2022 0818    EGFRNONAA >60.0 05/11/2022 0818    EGFRNONAA >60.0 05/11/2022 0818        Lab Results   Component Value Date    CHOL 198 07/17/2023    CHOL 172 05/11/2022    CHOL 174 06/08/2021     Lab Results   Component Value Date    HDL 55 07/17/2023    HDL 52 05/11/2022    HDL 52 06/08/2021     Lab Results   Component Value Date     "LDLCALC 122.2 07/17/2023    LDLCALC 106.6 05/11/2022    LDLCALC 105.0 06/08/2021     No results found for: "DLDL"  Lab Results   Component Value Date    TRIG 104 07/17/2023    TRIG 67 05/11/2022    TRIG 85 06/08/2021       f1   Lab Results   Component Value Date    CHOLHDL 27.8 07/17/2023    CHOLHDL 30.2 05/11/2022    CHOLHDL 29.9 06/08/2021     Lab Results   Component Value Date    HGBA1C 5.2 07/17/2023     CTA Cardiac 7/27/23  Impression:     Your total calcium score is 754.  High coronary heart disease risk. This report was flagged in Epic as abnormal.  Assessment:      1. Elevated coronary artery calcium score    2. Lumbar disc disease    3. Mixed hyperlipidemia    4. Palpitations    5. Tortuous aorta    6. Severe obesity with body mass index (BMI) of 35.0 to 35.9 and comorbidity    7. Right leg pain    8. CORRIGAN (dyspnea on exertion)    9. Fatigue, unspecified type    10. Atypical chest pain      Patient presents for f/u. Doing ok. She is having right leg/right hip pain, may be due to underlying lumbar disc disease, needs PAD ruled out. Check SELAM and RLE arterial U/S. CTA with score of 754, high risk, Reviewed images, calcification noted LM/LAD. Patient with CORRIGAN/fatigue, some atypical CP symptoms. Will discuss LHC with Dr. Sheehan. Add ASA 81 mg daily. Lipid panel pending.  Plan:   -SELAM, RLE arterial U/S with phone review  -Start ASA 81 mg daily  -Lipid panel pending  -Will discuss cath with Dr. Sheehan  -Follow-up TBA      "

## 2023-10-26 ENCOUNTER — HOSPITAL ENCOUNTER (OUTPATIENT)
Dept: CARDIOLOGY | Facility: HOSPITAL | Age: 68
Discharge: HOME | End: 2023-10-26
Attending: PHYSICIAN ASSISTANT
Payer: MEDICARE

## 2023-10-26 ENCOUNTER — TELEPHONE (OUTPATIENT)
Dept: CARDIOLOGY | Facility: HOSPITAL | Age: 68
End: 2023-10-26
Payer: MEDICARE

## 2023-10-26 VITALS
BODY MASS INDEX: 33.42 KG/M2 | WEIGHT: 177 LBS | HEIGHT: 61 IN | DIASTOLIC BLOOD PRESSURE: 75 MMHG | SYSTOLIC BLOOD PRESSURE: 143 MMHG | WEIGHT: 177 LBS | BODY MASS INDEX: 33.42 KG/M2 | DIASTOLIC BLOOD PRESSURE: 75 MMHG | HEIGHT: 61 IN | SYSTOLIC BLOOD PRESSURE: 143 MMHG

## 2023-10-26 DIAGNOSIS — M79.604 RIGHT LEG PAIN: ICD-10-CM

## 2023-10-26 LAB
LEFT ABI: 1.29
LEFT ARM BP: 136 MMHG
LEFT DORSALIS PEDIS: 156 MMHG
LEFT POSTERIOR TIBIAL: 185 MMHG
LEFT TBI: 0.9
LEFT TOE PRESSURE: 129 MMHG
OHS CV RIGHT ABI LOWER EXTREMITY (NO CALC): 1.17
RIGHT ABI: 1.17
RIGHT ANT TIBIAL SYS PSV: 46 CM/S
RIGHT ARM BP: 143 MMHG
RIGHT CFA PSV: 147 CM/S
RIGHT DORSALIS PEDIS: 168 MMHG
RIGHT PERONEAL SYS PSV: 60 CM/S
RIGHT POPLITEAL PSV: 73 CM/S
RIGHT POST TIBIAL SYS PSV: 83 CM/S
RIGHT POSTERIOR TIBIAL: 159 MMHG
RIGHT PROFUNDA SYS PSV: 148 CM/S
RIGHT SUPER FEMORAL DIST SYS PSV: 89 CM/S
RIGHT SUPER FEMORAL MID SYS PSV: 113 CM/S
RIGHT SUPER FEMORAL OSTIAL SYS PSV: 86 CM/S
RIGHT SUPER FEMORAL PROX SYS PSV: 93 CM/S
RIGHT TBI: 0.72
RIGHT TIB/PER TRUNK SYS PSV: 77 CM/S
RIGHT TOE PRESSURE: 103 MMHG

## 2023-10-26 PROCEDURE — 93922 UPR/L XTREMITY ART 2 LEVELS: CPT | Mod: HCNC

## 2023-10-26 PROCEDURE — 93926 LOWER EXTREMITY STUDY: CPT | Mod: HCNC,RT

## 2023-10-26 PROCEDURE — 93922 UPR/L XTREMITY ART 2 LEVELS: CPT | Mod: 26,HCNC,, | Performed by: INTERNAL MEDICINE

## 2023-10-26 PROCEDURE — 93926 LOWER EXTREMITY STUDY: CPT | Mod: 26,HCNC,RT, | Performed by: INTERNAL MEDICINE

## 2023-10-26 PROCEDURE — 93926 CV US DOPPLER ARTERIAL LEG RIGHT (CUPID ONLY): ICD-10-PCS | Mod: 26,HCNC,RT, | Performed by: INTERNAL MEDICINE

## 2023-10-26 PROCEDURE — 93922 ANKLE BRACHIAL INDICES (ABI): ICD-10-PCS | Mod: 26,HCNC,, | Performed by: INTERNAL MEDICINE

## 2023-10-26 NOTE — TELEPHONE ENCOUNTER
Contacted pt and informed her SELAM/Arterial U/S reviewed, no significant blockage in right leg. Message sent to Dr. Sheehan to review her cardiac CTA, will be getting back with her. Pt vu w/o q/c      ---- ABDIRASHID Murcia 10/26/23 03:46 PM ---  Please phone patient. SELAM/Arterial U/S reviewed, no significant blockage in right leg.    I have sent msg to Dr. Sheehan to review her cardiac CTA, will be getting back with her.    Thanks

## 2023-10-26 NOTE — TELEPHONE ENCOUNTER
Please phone patient. SELAM/Arterial U/S reviewed, no significant blockage in right leg.    I have sent msg to Dr. Sheehan to review her cardiac CTA, will be getting back with her.    Thanks

## 2023-10-27 ENCOUNTER — DOCUMENTATION ONLY (OUTPATIENT)
Dept: PRIMARY CARE CLINIC | Facility: CLINIC | Age: 68
End: 2023-10-27
Payer: MEDICARE

## 2023-10-27 ENCOUNTER — OFFICE VISIT (OUTPATIENT)
Dept: PRIMARY CARE CLINIC | Facility: CLINIC | Age: 68
End: 2023-10-27
Payer: MEDICARE

## 2023-10-27 VITALS
BODY MASS INDEX: 33 KG/M2 | HEART RATE: 82 BPM | SYSTOLIC BLOOD PRESSURE: 128 MMHG | TEMPERATURE: 98 F | DIASTOLIC BLOOD PRESSURE: 62 MMHG | HEIGHT: 61 IN | OXYGEN SATURATION: 99 % | WEIGHT: 174.81 LBS

## 2023-10-27 DIAGNOSIS — I77.1 TORTUOUS AORTA: ICD-10-CM

## 2023-10-27 DIAGNOSIS — R93.1 ELEVATED CORONARY ARTERY CALCIUM SCORE: Primary | ICD-10-CM

## 2023-10-27 DIAGNOSIS — E78.2 MIXED HYPERLIPIDEMIA: Chronic | ICD-10-CM

## 2023-10-27 DIAGNOSIS — R06.09 DOE (DYSPNEA ON EXERTION): ICD-10-CM

## 2023-10-27 DIAGNOSIS — F33.41 RECURRENT MAJOR DEPRESSIVE DISORDER, IN PARTIAL REMISSION: ICD-10-CM

## 2023-10-27 PROCEDURE — 3044F HG A1C LEVEL LT 7.0%: CPT | Mod: HCNC,CPTII,S$GLB, | Performed by: INTERNAL MEDICINE

## 2023-10-27 PROCEDURE — 3078F DIAST BP <80 MM HG: CPT | Mod: HCNC,CPTII,S$GLB, | Performed by: INTERNAL MEDICINE

## 2023-10-27 PROCEDURE — 3008F PR BODY MASS INDEX (BMI) DOCUMENTED: ICD-10-PCS | Mod: HCNC,CPTII,S$GLB, | Performed by: INTERNAL MEDICINE

## 2023-10-27 PROCEDURE — 1157F PR ADVANCE CARE PLAN OR EQUIV PRESENT IN MEDICAL RECORD: ICD-10-PCS | Mod: HCNC,CPTII,S$GLB, | Performed by: INTERNAL MEDICINE

## 2023-10-27 PROCEDURE — 1159F PR MEDICATION LIST DOCUMENTED IN MEDICAL RECORD: ICD-10-PCS | Mod: HCNC,CPTII,S$GLB, | Performed by: INTERNAL MEDICINE

## 2023-10-27 PROCEDURE — 99999 PR PBB SHADOW E&M-EST. PATIENT-LVL IV: CPT | Mod: PBBFAC,HCNC,, | Performed by: INTERNAL MEDICINE

## 2023-10-27 PROCEDURE — 3044F PR MOST RECENT HEMOGLOBIN A1C LEVEL <7.0%: ICD-10-PCS | Mod: HCNC,CPTII,S$GLB, | Performed by: INTERNAL MEDICINE

## 2023-10-27 PROCEDURE — 1125F AMNT PAIN NOTED PAIN PRSNT: CPT | Mod: HCNC,CPTII,S$GLB, | Performed by: INTERNAL MEDICINE

## 2023-10-27 PROCEDURE — 99213 PR OFFICE/OUTPT VISIT, EST, LEVL III, 20-29 MIN: ICD-10-PCS | Mod: HCNC,S$GLB,, | Performed by: INTERNAL MEDICINE

## 2023-10-27 PROCEDURE — 1125F PR PAIN SEVERITY QUANTIFIED, PAIN PRESENT: ICD-10-PCS | Mod: HCNC,CPTII,S$GLB, | Performed by: INTERNAL MEDICINE

## 2023-10-27 PROCEDURE — 3074F PR MOST RECENT SYSTOLIC BLOOD PRESSURE < 130 MM HG: ICD-10-PCS | Mod: HCNC,CPTII,S$GLB, | Performed by: INTERNAL MEDICINE

## 2023-10-27 PROCEDURE — 3074F SYST BP LT 130 MM HG: CPT | Mod: HCNC,CPTII,S$GLB, | Performed by: INTERNAL MEDICINE

## 2023-10-27 PROCEDURE — 3078F PR MOST RECENT DIASTOLIC BLOOD PRESSURE < 80 MM HG: ICD-10-PCS | Mod: HCNC,CPTII,S$GLB, | Performed by: INTERNAL MEDICINE

## 2023-10-27 PROCEDURE — 99213 OFFICE O/P EST LOW 20 MIN: CPT | Mod: HCNC,S$GLB,, | Performed by: INTERNAL MEDICINE

## 2023-10-27 PROCEDURE — 99999 PR PBB SHADOW E&M-EST. PATIENT-LVL IV: ICD-10-PCS | Mod: PBBFAC,HCNC,, | Performed by: INTERNAL MEDICINE

## 2023-10-27 PROCEDURE — 1159F MED LIST DOCD IN RCRD: CPT | Mod: HCNC,CPTII,S$GLB, | Performed by: INTERNAL MEDICINE

## 2023-10-27 PROCEDURE — 1157F ADVNC CARE PLAN IN RCRD: CPT | Mod: HCNC,CPTII,S$GLB, | Performed by: INTERNAL MEDICINE

## 2023-10-27 PROCEDURE — 3008F BODY MASS INDEX DOCD: CPT | Mod: HCNC,CPTII,S$GLB, | Performed by: INTERNAL MEDICINE

## 2023-10-27 NOTE — PROGRESS NOTES
Spoke with patient today during her MD appointment. Rescheduled her initial LCSW visit for November 9, 2023 at 3:00 p.m.

## 2023-10-30 ENCOUNTER — TELEPHONE (OUTPATIENT)
Dept: CARDIOLOGY | Facility: HOSPITAL | Age: 68
End: 2023-10-30
Payer: MEDICARE

## 2023-12-07 ENCOUNTER — TELEPHONE (OUTPATIENT)
Dept: CARDIOLOGY | Facility: HOSPITAL | Age: 68
End: 2023-12-07
Payer: MEDICARE

## 2023-12-07 NOTE — TELEPHONE ENCOUNTER
----- Message from Kiki Bui sent at 12/7/2023  8:04 AM CST -----  Contact: Mouna  Mouna needs a call back at  [phone], Regards to getting her appointment reschedule due to her not feeling well.    Thanks  Td

## 2023-12-07 NOTE — TELEPHONE ENCOUNTER
Returned call. Patient rescheduled due having the flu. Patient rescheduled to 12/27/2023, [facility] [place] start time of 0800

## 2023-12-25 PROBLEM — Z00.00 ENCOUNTER FOR PREVENTIVE HEALTH EXAMINATION: Status: RESOLVED | Noted: 2023-09-25 | Resolved: 2023-12-25

## 2023-12-27 ENCOUNTER — HOSPITAL ENCOUNTER (OUTPATIENT)
Dept: RADIOLOGY | Facility: HOSPITAL | Age: 68
Discharge: HOME | End: 2023-12-27
Attending: PHYSICIAN ASSISTANT
Payer: MEDICARE

## 2023-12-27 ENCOUNTER — HOSPITAL ENCOUNTER (OUTPATIENT)
Dept: CARDIOLOGY | Facility: HOSPITAL | Age: 68
Discharge: HOME | End: 2023-12-27
Attending: PHYSICIAN ASSISTANT
Payer: MEDICARE

## 2023-12-27 DIAGNOSIS — R93.1 ELEVATED CORONARY ARTERY CALCIUM SCORE: ICD-10-CM

## 2023-12-27 DIAGNOSIS — R06.09 DOE (DYSPNEA ON EXERTION): ICD-10-CM

## 2023-12-27 DIAGNOSIS — E78.2 MIXED HYPERLIPIDEMIA: Chronic | ICD-10-CM

## 2023-12-27 DIAGNOSIS — I77.1 TORTUOUS AORTA: ICD-10-CM

## 2023-12-27 LAB
CV STRESS BASE HR: 72 BPM
DIASTOLIC BLOOD PRESSURE: 76 MMHG
NUC REST EJECTION FRACTION: 85
NUC STRESS EJECTION FRACTION: 73 %
OHS CV CPX 85 PERCENT MAX PREDICTED HEART RATE MALE: 130
OHS CV CPX ESTIMATED METS: 1
OHS CV CPX MAX PREDICTED HEART RATE: 153
OHS CV CPX PATIENT IS FEMALE: 1
OHS CV CPX PATIENT IS MALE: 0
OHS CV CPX PEAK DIASTOLIC BLOOD PRESSURE: 75 MMHG
OHS CV CPX PEAK HEAR RATE: 103 BPM
OHS CV CPX PEAK RATE PRESSURE PRODUCT: NORMAL
OHS CV CPX PEAK SYSTOLIC BLOOD PRESSURE: 143 MMHG
OHS CV CPX PERCENT MAX PREDICTED HEART RATE ACHIEVED: 70
OHS CV CPX RATE PRESSURE PRODUCT PRESENTING: 9216
SYSTOLIC BLOOD PRESSURE: 128 MMHG

## 2023-12-27 PROCEDURE — 78452 NUCLEAR STRESS - CARDIOLOGY INTERPRETED (CUPID ONLY): ICD-10-PCS | Mod: 26,HCNC,, | Performed by: INTERNAL MEDICINE

## 2023-12-27 PROCEDURE — A9502 TC99M TETROFOSMIN: HCPCS | Mod: HCNC

## 2023-12-27 PROCEDURE — 78452 HT MUSCLE IMAGE SPECT MULT: CPT | Mod: 26,HCNC,, | Performed by: INTERNAL MEDICINE

## 2023-12-27 PROCEDURE — 78452 HT MUSCLE IMAGE SPECT MULT: CPT | Mod: HCNC

## 2023-12-27 PROCEDURE — 93016 CV STRESS TEST SUPVJ ONLY: CPT | Mod: HCNC,,, | Performed by: INTERNAL MEDICINE

## 2023-12-27 PROCEDURE — 93018 CV STRESS TEST I&R ONLY: CPT | Mod: HCNC,,, | Performed by: INTERNAL MEDICINE

## 2023-12-27 PROCEDURE — 63600175 PHARM REV CODE 636 W HCPCS: Mod: HCNC | Performed by: PHYSICIAN ASSISTANT

## 2023-12-27 PROCEDURE — 93017 CV STRESS TEST TRACING ONLY: CPT | Mod: HCNC

## 2023-12-27 PROCEDURE — 93018 NUCLEAR STRESS - CARDIOLOGY INTERPRETED (CUPID ONLY): ICD-10-PCS | Mod: HCNC,,, | Performed by: INTERNAL MEDICINE

## 2023-12-27 PROCEDURE — 93016 NUCLEAR STRESS - CARDIOLOGY INTERPRETED (CUPID ONLY): ICD-10-PCS | Mod: HCNC,,, | Performed by: INTERNAL MEDICINE

## 2023-12-27 RX ORDER — REGADENOSON 0.08 MG/ML
0.4 INJECTION, SOLUTION INTRAVENOUS ONCE
Status: COMPLETED | OUTPATIENT
Start: 2023-12-27 | End: 2023-12-27

## 2023-12-27 RX ADMIN — REGADENOSON 0.4 MG: 0.08 INJECTION, SOLUTION INTRAVENOUS at 10:12

## 2023-12-28 ENCOUNTER — TELEPHONE (OUTPATIENT)
Dept: CARDIOLOGY | Facility: HOSPITAL | Age: 68
End: 2023-12-28
Payer: MEDICARE

## 2023-12-28 NOTE — TELEPHONE ENCOUNTER
Attempted to contact pt and inform him She passed her stress test, to make sure she started ASA 81 mg daily, and let her know she is scheduled for a f/u w/ Dr Sheehan on 02/21/24. No answer,  full.      ---- ABDIRASHID Murcia 12/28/23 01:46 PM ----  Please phone patient. She passed her stress test. Make sure she started ASA 81 mg daily.    She needs f/u with Dr. Sheehan in 6-8 weeks.    Thanks

## 2023-12-28 NOTE — TELEPHONE ENCOUNTER
Please phone patient. She passed her stress test. Make sure she started ASA 81 mg daily.    She needs f/u with Dr. Sheehan in 6-8 weeks.    Thanks

## 2024-01-08 DIAGNOSIS — L03.116 CELLULITIS OF LEFT LEG: ICD-10-CM

## 2024-01-09 RX ORDER — AMOXICILLIN AND CLAVULANATE POTASSIUM 875; 125 MG/1; MG/1
1 TABLET, FILM COATED ORAL 2 TIMES DAILY
Qty: 20 TABLET | Refills: 0 | OUTPATIENT
Start: 2024-01-09

## 2024-01-11 DIAGNOSIS — Z00.00 ENCOUNTER FOR MEDICARE ANNUAL WELLNESS EXAM: ICD-10-CM

## 2024-01-30 ENCOUNTER — OFFICE VISIT (OUTPATIENT)
Dept: PRIMARY CARE CLINIC | Facility: CLINIC | Age: 69
End: 2024-01-30
Payer: MEDICARE

## 2024-01-30 VITALS
DIASTOLIC BLOOD PRESSURE: 68 MMHG | TEMPERATURE: 98 F | BODY MASS INDEX: 33.05 KG/M2 | WEIGHT: 175.06 LBS | OXYGEN SATURATION: 97 % | SYSTOLIC BLOOD PRESSURE: 128 MMHG | HEIGHT: 61 IN | HEART RATE: 74 BPM

## 2024-01-30 DIAGNOSIS — I25.10 ATHEROSCLEROSIS OF CORONARY ARTERY, UNSPECIFIED VESSEL OR LESION TYPE, UNSPECIFIED WHETHER ANGINA PRESENT, UNSPECIFIED WHETHER NATIVE OR TRANSPLANTED HEART: ICD-10-CM

## 2024-01-30 DIAGNOSIS — E66.01 SEVERE OBESITY WITH BODY MASS INDEX (BMI) OF 35.0 TO 35.9 AND COMORBIDITY: ICD-10-CM

## 2024-01-30 DIAGNOSIS — I77.1 TORTUOUS AORTA: ICD-10-CM

## 2024-01-30 DIAGNOSIS — F33.41 RECURRENT MAJOR DEPRESSIVE DISORDER, IN PARTIAL REMISSION: ICD-10-CM

## 2024-01-30 DIAGNOSIS — R93.1 ELEVATED CORONARY ARTERY CALCIUM SCORE: Primary | ICD-10-CM

## 2024-01-30 PROCEDURE — 99999 PR PBB SHADOW E&M-EST. PATIENT-LVL V: CPT | Mod: PBBFAC,HCNC,, | Performed by: FAMILY MEDICINE

## 2024-01-30 PROCEDURE — 1157F ADVNC CARE PLAN IN RCRD: CPT | Mod: HCNC,CPTII,S$GLB, | Performed by: FAMILY MEDICINE

## 2024-01-30 PROCEDURE — 1126F AMNT PAIN NOTED NONE PRSNT: CPT | Mod: HCNC,CPTII,S$GLB, | Performed by: FAMILY MEDICINE

## 2024-01-30 PROCEDURE — 3074F SYST BP LT 130 MM HG: CPT | Mod: HCNC,CPTII,S$GLB, | Performed by: FAMILY MEDICINE

## 2024-01-30 PROCEDURE — 3288F FALL RISK ASSESSMENT DOCD: CPT | Mod: HCNC,CPTII,S$GLB, | Performed by: FAMILY MEDICINE

## 2024-01-30 PROCEDURE — 1159F MED LIST DOCD IN RCRD: CPT | Mod: HCNC,CPTII,S$GLB, | Performed by: FAMILY MEDICINE

## 2024-01-30 PROCEDURE — 3008F BODY MASS INDEX DOCD: CPT | Mod: HCNC,CPTII,S$GLB, | Performed by: FAMILY MEDICINE

## 2024-01-30 PROCEDURE — 3078F DIAST BP <80 MM HG: CPT | Mod: HCNC,CPTII,S$GLB, | Performed by: FAMILY MEDICINE

## 2024-01-30 PROCEDURE — 1160F RVW MEDS BY RX/DR IN RCRD: CPT | Mod: HCNC,CPTII,S$GLB, | Performed by: FAMILY MEDICINE

## 2024-01-30 PROCEDURE — 99214 OFFICE O/P EST MOD 30 MIN: CPT | Mod: HCNC,S$GLB,, | Performed by: FAMILY MEDICINE

## 2024-01-30 PROCEDURE — 1101F PT FALLS ASSESS-DOCD LE1/YR: CPT | Mod: HCNC,CPTII,S$GLB, | Performed by: FAMILY MEDICINE

## 2024-01-30 RX ORDER — EZETIMIBE 10 MG/1
10 TABLET ORAL DAILY
Qty: 90 TABLET | Refills: 3 | Status: SHIPPED | OUTPATIENT
Start: 2024-01-30 | End: 2025-01-29

## 2024-01-30 RX ORDER — ASPIRIN 81 MG/1
81 TABLET ORAL DAILY
Qty: 90 TABLET | Refills: 3 | Status: SHIPPED | OUTPATIENT
Start: 2024-01-30 | End: 2025-01-29

## 2024-01-30 NOTE — PATIENT INSTRUCTIONS
If you are feeling unwell, we'd like to be the first ones to know here at [facility]! Please give us a call. Same day appointments are our top priority to keep you well and out of the emergency rooms and hospitals. Call [phone] for our direct line. After hours advice is always available. Please call [phone] after hours to speak to the on-call team.     Make exercise part of your routine; go even on days when you're tired and don't feel like going, even if it's a really quick visit to the gym. Just DON'T SKIP. This is how habits are formed. Be consistent.     Add fiber to your diet; some like to take metamucil (or any psyllium based fiber supplement) prior to their largest of the meal of the day. Other good fiber sources are fruits like berries, apples, pears, oranges and vegetables like broccoli, cauliflower, cabbage, lettuce.

## 2024-01-30 NOTE — PROGRESS NOTES
Subjective:       Patient ID: Mouna Chandra is a 69 y.o. female.    Chief Complaint: Follow-up (3 month f/u)    HPI:     Here for follow up of medical problems. Had stress test with cardiology which was normal; recommendation for daily asa 81 mg and continue statin. Goal LDL <55  Down 7# more, total 14#.  Cutting out sugars.  Active in yard daily. Having nightmares that sometimes interfere with sleep. Also pain with sciatica.   Depression doing well, to meet counselor soon.  Taking buspar about once a week.      The 10-year ASCVD risk score (Bernarda WILKERSON, et al., 2019) is: 6.2%    Values used to calculate the score:      Age: 68 years      Sex: Female      Is Non- : No      Diabetic: No      Tobacco smoker: No      Systolic Blood Pressure: 128 mmHg      Is BP treated: No      HDL Cholesterol: 51 mg/dL      Total Cholesterol: 146 mg/dL    Updated/ annual due 7/24:  HM: 9/23 fluvax, 3/17 [id number], 6/21 [id number], 11/16 TDaP, 10/16 zostavax, 6/21 Shingrix x2, 6/22 BMD rep 2y, 1/15 Cscope rep due 10y, 7/23 MMG, 2/26/24 Eye Dr. Tobin, 5/21 HCV neg.    Objective:     Vitals:    01/30/24 0933   BP: 128/68   Pulse: 74   Temp: 98.1 °F (36.7 °C)     Wt Readings from Last 3 Encounters:   01/30/24 79.4 kg (175 lb 0.7 oz)   10/27/23 79.3 kg (174 lb 12.8 oz)   10/26/23 80.3 kg (177 lb)     Temp Readings from Last 3 Encounters:   01/30/24 98.1 °F (36.7 °C) (Oral)   10/27/23 98.2 °F (36.8 °C) (Oral)   09/25/23 98 °F (36.7 °C) (Oral)     BP Readings from Last 3 Encounters:   01/30/24 128/68   10/27/23 128/62   10/26/23 (!) 143/75     Pulse Readings from Last 3 Encounters:   01/30/24 74   10/27/23 82   10/20/23 79          Physical Exam  Vitals and nursing note reviewed.   Constitutional:       Appearance: She is well-developed.   HENT:      Head: Normocephalic and atraumatic.   Eyes:      Pupils: Pupils are equal, round, and reactive to light.   Cardiovascular:      Rate and Rhythm: Normal rate and  regular rhythm.   Pulmonary:      Effort: Pulmonary effort is normal.      Breath sounds: Normal breath sounds.   Musculoskeletal:         General: No deformity. Normal range of motion.      Cervical back: Normal range of motion and neck supple.   Skin:     General: Skin is warm and dry.   Neurological:      Mental Status: She is alert and oriented to person, place, and time.   Psychiatric:         Behavior: Behavior normal.           Albumin   Date Value Ref Range Status   07/17/2023 4.0 3.5 - 5.2 g/dL Final     eGFR   Date Value Ref Range Status   07/17/2023 >60.0 >60 mL/min/1.73 m^2 Final       Assessment:       1. Elevated coronary artery calcium score    2. Severe obesity with body mass index (BMI) of 35.0 to 35.9 and comorbidity    3. Recurrent major depressive disorder, in partial remission    4. Tortuous aorta    5. Atherosclerosis of coronary artery, unspecified vessel or lesion type, unspecified whether angina present, unspecified whether native or transplanted heart        Plan:           Problem List Items Addressed This Visit          Psychiatric    Recurrent major depressive disorder, in partial remission    Relevant Orders    TSH       Cardiac/Vascular    Tortuous aorta    Current Assessment & Plan     Chronic, stable  Increase activity level, low fat diet  Control BP and continue atorvastatin         Relevant Medications    ezetimibe (ZETIA) 10 mg tablet    Elevated coronary artery calcium score - Primary    Relevant Medications    aspirin (ECOTRIN) 81 MG EC tablet       Endocrine    Severe obesity with body mass index (BMI) of 35.0 to 35.9 and comorbidity    Relevant Orders    TSH     Other Visit Diagnoses       Atherosclerosis of coronary artery, unspecified vessel or lesion type, unspecified whether angina present, unspecified whether native or transplanted heart        Relevant Medications    ezetimibe (ZETIA) 10 mg tablet    Other Relevant Orders    Lipid Panel    COMPREHENSIVE METABOLIC PANEL           Referred to therapy but hasn't been able to coordinate with schedule yet  Counseled on high fiber, low saturated fat diet. Add Zetia to statin and repeat lipid with next lab; goal LDL <55  Add exercise 3-5 days per week    RSV vaccine sheet   3 month f/u  Labs prior to 3 month visit at [facility] in [place]

## 2024-02-20 DIAGNOSIS — E78.2 MIXED HYPERLIPIDEMIA: Primary | ICD-10-CM

## 2024-02-21 ENCOUNTER — OFFICE VISIT (OUTPATIENT)
Dept: CARDIOLOGY | Facility: CLINIC | Age: 69
End: 2024-02-21
Payer: MEDICARE

## 2024-02-21 ENCOUNTER — HOSPITAL ENCOUNTER (OUTPATIENT)
Dept: CARDIOLOGY | Facility: HOSPITAL | Age: 69
Discharge: HOME | End: 2024-02-21
Attending: INTERNAL MEDICINE
Payer: MEDICARE

## 2024-02-21 VITALS
DIASTOLIC BLOOD PRESSURE: 70 MMHG | BODY MASS INDEX: 33.71 KG/M2 | HEIGHT: 61 IN | SYSTOLIC BLOOD PRESSURE: 132 MMHG | HEART RATE: 86 BPM | WEIGHT: 178.56 LBS

## 2024-02-21 DIAGNOSIS — R53.82 CHRONIC FATIGUE: ICD-10-CM

## 2024-02-21 DIAGNOSIS — R07.9 CHEST PAIN SYNDROME: ICD-10-CM

## 2024-02-21 DIAGNOSIS — F41.0 PANIC ATTACK: ICD-10-CM

## 2024-02-21 DIAGNOSIS — R06.09 DOE (DYSPNEA ON EXERTION): ICD-10-CM

## 2024-02-21 DIAGNOSIS — R06.83 SNORING: ICD-10-CM

## 2024-02-21 DIAGNOSIS — K21.9 GASTROESOPHAGEAL REFLUX DISEASE WITHOUT ESOPHAGITIS: ICD-10-CM

## 2024-02-21 DIAGNOSIS — E78.2 MIXED HYPERLIPIDEMIA: ICD-10-CM

## 2024-02-21 DIAGNOSIS — R00.2 PALPITATIONS: ICD-10-CM

## 2024-02-21 DIAGNOSIS — I77.1 TORTUOUS AORTA: ICD-10-CM

## 2024-02-21 DIAGNOSIS — R93.1 ELEVATED CORONARY ARTERY CALCIUM SCORE: Primary | ICD-10-CM

## 2024-02-21 DIAGNOSIS — F41.8 MIXED ANXIETY AND DEPRESSIVE DISORDER: ICD-10-CM

## 2024-02-21 DIAGNOSIS — E66.01 SEVERE OBESITY WITH BODY MASS INDEX (BMI) OF 35.0 TO 35.9 AND COMORBIDITY: ICD-10-CM

## 2024-02-21 DIAGNOSIS — E78.2 MIXED HYPERLIPIDEMIA: Chronic | ICD-10-CM

## 2024-02-21 DIAGNOSIS — I73.9 PVD (PERIPHERAL VASCULAR DISEASE): ICD-10-CM

## 2024-02-21 PROCEDURE — 1126F AMNT PAIN NOTED NONE PRSNT: CPT | Mod: HCNC,CPTII,S$GLB, | Performed by: INTERNAL MEDICINE

## 2024-02-21 PROCEDURE — 99999 PR PBB SHADOW E&M-EST. PATIENT-LVL V: CPT | Mod: PBBFAC,HCNC,, | Performed by: INTERNAL MEDICINE

## 2024-02-21 PROCEDURE — 3008F BODY MASS INDEX DOCD: CPT | Mod: HCNC,CPTII,S$GLB, | Performed by: INTERNAL MEDICINE

## 2024-02-21 PROCEDURE — 1160F RVW MEDS BY RX/DR IN RCRD: CPT | Mod: HCNC,CPTII,S$GLB, | Performed by: INTERNAL MEDICINE

## 2024-02-21 PROCEDURE — 3075F SYST BP GE 130 - 139MM HG: CPT | Mod: HCNC,CPTII,S$GLB, | Performed by: INTERNAL MEDICINE

## 2024-02-21 PROCEDURE — 1101F PT FALLS ASSESS-DOCD LE1/YR: CPT | Mod: HCNC,CPTII,S$GLB, | Performed by: INTERNAL MEDICINE

## 2024-02-21 PROCEDURE — 1159F MED LIST DOCD IN RCRD: CPT | Mod: HCNC,CPTII,S$GLB, | Performed by: INTERNAL MEDICINE

## 2024-02-21 PROCEDURE — 99215 OFFICE O/P EST HI 40 MIN: CPT | Mod: HCNC,S$GLB,, | Performed by: INTERNAL MEDICINE

## 2024-02-21 PROCEDURE — 3078F DIAST BP <80 MM HG: CPT | Mod: HCNC,CPTII,S$GLB, | Performed by: INTERNAL MEDICINE

## 2024-02-21 PROCEDURE — 1157F ADVNC CARE PLAN IN RCRD: CPT | Mod: HCNC,CPTII,S$GLB, | Performed by: INTERNAL MEDICINE

## 2024-02-21 PROCEDURE — 93010 ELECTROCARDIOGRAM REPORT: CPT | Mod: HCNC,,, | Performed by: INTERNAL MEDICINE

## 2024-02-21 PROCEDURE — 3288F FALL RISK ASSESSMENT DOCD: CPT | Mod: HCNC,CPTII,S$GLB, | Performed by: INTERNAL MEDICINE

## 2024-02-21 PROCEDURE — 93005 ELECTROCARDIOGRAM TRACING: CPT | Mod: HCNC

## 2024-02-21 NOTE — PROGRESS NOTES
Subjective:   Patient ID:  Mouna Chandra is a 69 y.o. female who presents for follow up of No chief complaint on file.      HPI  10/20/2023 CITLALY MENDENHALL   Ms. Chandra is a 68 year old female whose current medical conditions include hyperlipidemia, panic attacks, obesity, and strong familial history of CAD who presents today for follow-up. Patient last seen by Dr. Sheehan on 10/7/2020. She was referred back to our clinic due to abnormal coronary CTA, coronary calcium score 754. State she feels ok today in clinic. Main issue is right leg/hip pain, more bothersome over past 3 weeks. She also has some associated ankle numbness/sensation. No sores, ulcerations, or discoloration. Feels different from her typical sciatica pain. Denies krystina exertional chest pain, heaviness, or tightness, but does admit to CORRIGAN. States she has to stop and rest fairly often with activity. Feels like she does not have much get up and go. Will occasionally get a flutter like discomfort in the middle of her chest, typically at rest and short-lived. Will sometimes radiate to her back. No LH, dizziness, near syncope, or syncope. No edema. BP in normal range, no on any medications. Recently had statin switched to higher potency, repeat lipid panel pending.     She has very strong history of familial CAD. Father with MI in his 60's. Brother with CABG in his 50's. She is compliant with her medications, not taking ASA but will start.     Reviewed actual CT imaging--appears to have significant calcification in LAD, will have Dr. Sheehan review.     2/21/2024   HERE FORF /U HAD EVALUATION FOR ELEVATED CA SCORE HER CARDIOLITE WAS NEGATIVE. HAS NOT BEEN EXERCISING  HAS NO EXERTIONAL SYMPTOMS CARDIAC WISE. HAS STRONG PREMATURE FH CAD. SHE HAS OCCASIONAL FLUTTERS RARE.   HAS SLEEP STUDY AT HOME NOT REPRODUCED ANY SLEEP APNEA.SHE SNORES LOUD AND WAKES HERSELF UP FROM SLEEP  Past Medical History:   Diagnosis Date    Anxiety 12/21/2016     Anxiety and depression     Familial juvenile macular degeneration syndrome - Both Eyes 11/12/2013    GERD (gastroesophageal reflux disease)     Hyperlipidemia LDL goal < 100     Lumbar disc disease     Dr. Chandra    Palpitation 12/21/2016    Panic attack 12/21/2016    Vitamin D deficiency        Past Surgical History:   Procedure Laterality Date    COLONOSCOPY      EXCISION OF GANGLION CYST OF HAND Right 3/16/2023    Procedure: EXCISION, GANGLION CYST, HAND;  Surgeon: Kurt Moreno MD;  Location: [facility] OR;  Service: Orthopedics;  Laterality: Right;  excision ganglion cyst right index finger dorsal metacarpophalangeal joint.    HYSTERECTOMY      INJECTION OF ANESTHETIC AGENT AROUND MEDIAL BRANCH NERVES INNERVATING CERVICAL FACET JOINT Bilateral 04/14/2021    Procedure: Bilateral C3-5 MBB with RN IV sedation;  Surgeon: Steve Gutierrez MD;  Location: [facility] PAIN MGT;  Service: Pain Management;  Laterality: Bilateral;    SELECTIVE INJECTION OF ANESTHETIC AGENT AROUND LUMBAR SPINAL NERVE ROOT BY TRANSFORAMINAL APPROACH Right 02/25/2021    Procedure: BLOCK, SPINAL NERVE ROOT, LUMBAR, SELECTIVE, TRANSFORAMINAL APPROACH Right L4-5, l5-S1 RN IV sedation;  Surgeon: Steve Gutierrez MD;  Location: [facility] PAIN MGT;  Service: Pain Management;  Laterality: Right;    TOTAL ABDOMINAL HYSTERECTOMY W/ BILATERAL SALPINGOOPHORECTOMY  2002       Social History     Tobacco Use    Smoking status: Never     Passive exposure: Yes    Smokeless tobacco: Never   Substance Use Topics    Alcohol use: Yes     Alcohol/week: 2.0 standard drinks of alcohol     Types: 2 Cans of beer per week     Comment: beer 2 nightly    Drug use: Not Currently     Types: Marijuana     Comment: few x week       Family History   Problem Relation Age of Onset    Diabetes Mother     Hypertension Mother     Heart failure Father     Heart attack Father     Amblyopia Sister     Macular degeneration Sister     Blindness Sister     Chronic back pain Sister     Heart disease  Brother 45    Crohn's disease Brother     Coronary artery disease Brother     Heart attack Paternal Grandmother     Heart failure Paternal Grandfather     Cataracts Paternal Uncle     Heart failure Paternal Uncle     Stroke Paternal Uncle     Cancer Neg Hx     Glaucoma Neg Hx     Retinal detachment Neg Hx     Strabismus Neg Hx     Thyroid disease Neg Hx     Colon cancer Neg Hx        Current Outpatient Medications   Medication Sig    alendronate (FOSAMAX) 70 MG tablet TAKE 1 TABLET(70 MG) BY MOUTH EVERY 7 DAYS    aspirin (ECOTRIN) 81 MG EC tablet Take 1 tablet (81 mg total) by mouth once daily.    atorvastatin (LIPITOR) 40 MG tablet Take 1 tablet (40 mg total) by mouth once daily.    busPIRone (BUSPAR) 7.5 MG tablet Take 1 tablet (7.5 mg total) by mouth 3 (three) times daily as needed (anxiety).    cholecalciferol, vitamin D3, (VITAMIN D3) 50 mcg (2,000 unit) Cap capsule Take 1 capsule (2,000 Units total) by mouth once daily.    diclofenac sodium (VOLTAREN) 1 % Gel APPLY 2 GRAMS TOPICALLY TO THE AFFECTED AREA EVERY DAY    ezetimibe (ZETIA) 10 mg tablet Take 1 tablet (10 mg total) by mouth once daily. For high cholesterol    gabapentin (NEURONTIN) 100 MG capsule Take 1-3 capsules (100-300 mg total) by mouth nightly as needed (nerve pain).    ibuprofen (ADVIL,MOTRIN) 800 MG tablet Take 800 mg by mouth every 6 (six) hours as needed for Pain.    Lactobacillus rhamnosus GG (CULTURELLE) 10 billion cell capsule Take 1 capsule by mouth once daily.    magnesium 250 mg Tab Take 250 mg by mouth once.    paroxetine (PAXIL) 40 MG tablet TAKE 1 TABLET(40 MG) BY MOUTH EVERY MORNING    traZODone (DESYREL) 50 MG tablet TAKE 1 AND 1/2 TABLETS(75 MG) BY MOUTH EVERY EVENING    vit A/vit C/vit E/zinc/copper (OCUVITE PRESERVISION ORAL) Take by mouth once daily.    vitamin E 400 UNIT capsule Take 400 Units by mouth once daily.     No current facility-administered medications for this visit.     Facility-Administered Medications Ordered in  Other Visits   Medication    cefazolin (ANCEF) 2 gram in dextrose 5% 50 mL IVPB (premix)    chlorhexidine 0.12 % solution 10 mL     Current Outpatient Medications on File Prior to Visit   Medication Sig    alendronate (FOSAMAX) 70 MG tablet TAKE 1 TABLET(70 MG) BY MOUTH EVERY 7 DAYS    aspirin (ECOTRIN) 81 MG EC tablet Take 1 tablet (81 mg total) by mouth once daily.    atorvastatin (LIPITOR) 40 MG tablet Take 1 tablet (40 mg total) by mouth once daily.    busPIRone (BUSPAR) 7.5 MG tablet Take 1 tablet (7.5 mg total) by mouth 3 (three) times daily as needed (anxiety).    cholecalciferol, vitamin D3, (VITAMIN D3) 50 mcg (2,000 unit) Cap capsule Take 1 capsule (2,000 Units total) by mouth once daily.    diclofenac sodium (VOLTAREN) 1 % Gel APPLY 2 GRAMS TOPICALLY TO THE AFFECTED AREA EVERY DAY    ezetimibe (ZETIA) 10 mg tablet Take 1 tablet (10 mg total) by mouth once daily. For high cholesterol    gabapentin (NEURONTIN) 100 MG capsule Take 1-3 capsules (100-300 mg total) by mouth nightly as needed (nerve pain).    ibuprofen (ADVIL,MOTRIN) 800 MG tablet Take 800 mg by mouth every 6 (six) hours as needed for Pain.    Lactobacillus rhamnosus GG (CULTURELLE) 10 billion cell capsule Take 1 capsule by mouth once daily.    magnesium 250 mg Tab Take 250 mg by mouth once.    paroxetine (PAXIL) 40 MG tablet TAKE 1 TABLET(40 MG) BY MOUTH EVERY MORNING    traZODone (DESYREL) 50 MG tablet TAKE 1 AND 1/2 TABLETS(75 MG) BY MOUTH EVERY EVENING    vit A/vit C/vit E/zinc/copper (OCUVITE PRESERVISION ORAL) Take by mouth once daily.    vitamin E 400 UNIT capsule Take 400 Units by mouth once daily.     Current Facility-Administered Medications on File Prior to Visit   Medication    cefazolin (ANCEF) 2 gram in dextrose 5% 50 mL IVPB (premix)    chlorhexidine 0.12 % solution 10 mL       Review of Systems   Constitutional: Negative for diaphoresis, malaise/fatigue and weight gain.   HENT:  Negative for hoarse voice.    Eyes:  Negative for  double vision and visual disturbance.   Cardiovascular:  Negative for chest pain, claudication, cyanosis, dyspnea on exertion, irregular heartbeat, leg swelling, near-syncope, orthopnea, palpitations, paroxysmal nocturnal dyspnea and syncope.   Respiratory:  Negative for cough, hemoptysis, shortness of breath and snoring.    Hematologic/Lymphatic: Negative for bleeding problem. Does not bruise/bleed easily.   Skin:  Negative for color change and poor wound healing.   Musculoskeletal:  Negative for muscle cramps, muscle weakness and myalgias.   Gastrointestinal:  Negative for bloating, abdominal pain, change in bowel habit, diarrhea, heartburn, hematemesis, hematochezia, melena and nausea.   Neurological:  Negative for excessive daytime sleepiness, dizziness, headaches, light-headedness, loss of balance, numbness and weakness.   Psychiatric/Behavioral:  Negative for memory loss. The patient does not have insomnia.    Allergic/Immunologic: Negative for hives.       Objective:   Physical Exam  Constitutional:       General: She is not in acute distress.     Appearance: Normal appearance. She is well-developed. She is obese. She is not ill-appearing.   HENT:      Head: Normocephalic and atraumatic.   Eyes:      General: No scleral icterus.     Pupils: Pupils are equal, round, and reactive to light.   Neck:      Thyroid: No thyromegaly.      Vascular: Normal carotid pulses. No carotid bruit, hepatojugular reflux or JVD.      Trachea: No tracheal deviation.   Cardiovascular:      Rate and Rhythm: Normal rate and regular rhythm.      Pulses: Normal pulses.      Heart sounds: Normal heart sounds. No murmur heard.     No friction rub. No gallop.   Pulmonary:      Effort: Pulmonary effort is normal. No respiratory distress.      Breath sounds: Normal breath sounds. No wheezing, rhonchi or rales.   Chest:      Chest wall: No tenderness.   Abdominal:      General: Bowel sounds are normal. There is no abdominal bruit.       "Palpations: Abdomen is soft. There is no hepatomegaly or pulsatile mass.      Tenderness: There is no abdominal tenderness.   Musculoskeletal:      Right shoulder: No deformity.      Cervical back: Normal range of motion and neck supple.   Skin:     General: Skin is warm and dry.      Findings: No erythema or rash.      Nails: There is no clubbing.   Neurological:      Mental Status: She is alert and oriented to person, place, and time.      Cranial Nerves: No cranial nerve deficit.      Coordination: Coordination normal.   Psychiatric:         Speech: Speech normal.         Behavior: Behavior normal.       Vitals:    02/21/24 1650 02/21/24 1656   BP: 136/64 132/70   BP Location: Left arm Right arm   Patient Position: Sitting Sitting   BP Method: Medium (Manual) Medium (Manual)   Pulse: 86    SpO2: (!) 0%    Weight: 81 kg (178 lb 9.2 oz)    Height: 5' 1" (1.549 m)      Lab Results   Component Value Date    CHOL 146 10/20/2023    CHOL 198 07/17/2023    CHOL 172 05/11/2022      Body mass index is 33.74 kg/m².   Lab Results   Component Value Date    HGBA1C 5.2 07/17/2023      BMP  Lab Results   Component Value Date     07/17/2023    K 4.1 07/17/2023     07/17/2023    CO2 24 07/17/2023    BUN 18 07/17/2023    CREATININE 0.8 07/17/2023    CALCIUM 9.2 07/17/2023    ANIONGAP 12 07/17/2023    EGFRNORACEVR >60.0 07/17/2023      Lab Results   Component Value Date    HDL 51 10/20/2023    HDL 55 07/17/2023    HDL 52 05/11/2022     Lab Results   Component Value Date    LDLCALC 80.6 10/20/2023    LDLCALC 122.2 07/17/2023    LDLCALC 106.6 05/11/2022     Lab Results   Component Value Date    TRIG 72 10/20/2023    TRIG 104 07/17/2023    TRIG 67 05/11/2022     Lab Results   Component Value Date    CHOLHDL 34.9 10/20/2023    CHOLHDL 27.8 07/17/2023    CHOLHDL 30.2 05/11/2022       Chemistry        Component Value Date/Time     07/17/2023 0801    K 4.1 07/17/2023 0801     07/17/2023 0801    CO2 24 07/17/2023 " 0801    BUN 18 07/17/2023 0801    CREATININE 0.8 07/17/2023 0801    GLU 91 07/17/2023 0801        Component Value Date/Time    CALCIUM 9.2 07/17/2023 0801    ALKPHOS 58 07/17/2023 0801    AST 18 07/17/2023 0801    ALT 17 10/20/2023 0825    BILITOT 0.4 07/17/2023 0801    ESTGFRAFRICA >60.0 05/11/2022 0818    ESTGFRAFRICA >60.0 05/11/2022 0818    EGFRNONAA >60.0 05/11/2022 0818    EGFRNONAA >60.0 05/11/2022 0818          Lab Results   Component Value Date    TSH 2.473 07/17/2023     Lab Results   Component Value Date    INR 1.0 06/07/2015     Lab Results   Component Value Date    WBC 7.50 07/17/2023    HGB 13.3 07/17/2023    HCT 41.2 07/17/2023    MCV 96 07/17/2023     07/17/2023     BMP  Sodium   Date Value Ref Range Status   07/17/2023 141 136 - 145 mmol/L Final     Potassium   Date Value Ref Range Status   07/17/2023 4.1 3.5 - 5.1 mmol/L Final     Chloride   Date Value Ref Range Status   07/17/2023 105 95 - 110 mmol/L Final     CO2   Date Value Ref Range Status   07/17/2023 24 23 - 29 mmol/L Final     BUN   Date Value Ref Range Status   07/17/2023 18 8 - 23 mg/dL Final     Creatinine   Date Value Ref Range Status   07/17/2023 0.8 0.5 - 1.4 mg/dL Final     Calcium   Date Value Ref Range Status   07/17/2023 9.2 8.7 - 10.5 mg/dL Final     Anion Gap   Date Value Ref Range Status   07/17/2023 12 8 - 16 mmol/L Final     eGFR if    Date Value Ref Range Status   05/11/2022 >60.0 >60 mL/min/1.73 m^2 Final   05/11/2022 >60.0 >60 mL/min/1.73 m^2 Final     eGFR if non    Date Value Ref Range Status   05/11/2022 >60.0 >60 mL/min/1.73 m^2 Final     Comment:     Calculation used to obtain the estimated glomerular filtration  rate (eGFR) is the CKD-EPI equation.      05/11/2022 >60.0 >60 mL/min/1.73 m^2 Final     Comment:     Calculation used to obtain the estimated glomerular filtration  rate (eGFR) is the CKD-EPI equation.        CrCl cannot be calculated (Patient's most recent lab result  is older than the maximum 7 days allowed.).    Conclusion         Normal myocardial perfusion scan. There is no evidence of myocardial ischemia or infarction.    There is a trivial intensity perfusion abnormality in the anteroseptal wall of the left ventricle, secondary to breast attenuation.    The gated perfusion images showed an ejection fraction of 85% at rest. The gated perfusion images showed an ejection fraction of 73% post stress.    The ECG portion of the study is negative for ischemia.    The patient reported no chest pain during the stress test.      Findings    SELAM The right resting SELAM reduction is normal.   The right ankle [DT] artery has biphasic flow.   The right ankle [DP] artery has biphasic flow.   The left resting SELAM reduction is normal.   The left ankle [PT] artery has biphasic flow.  The left ankle [DP] artery has biphasic flow.       US Measurements - SELAM    Right   Right arm  mmHg         Right posterior tibial 159 mmHg         Right dorsalis pedis 168 mmHg         Right SELAM 1.17         Right toe pressure 103 mmHg         Right TBI 0.72          Left   Left arm  mmHg         Left posterior tibial 185 mmHg         Left dorsalis pedis 156 mmHg         Left SELAM 1.29         Left toe pressure 129 mmHg         Left TBI 0.9              Conclusion         Normal selam. has calcified vessels with non obs distal sfa plaque of hemodynamic significance. waveforms are normal.    Essentialy nop hemodynamically significant stenosis in rt lower extremity     Assessment:     1. Elevated coronary artery calcium score    2. Mixed hyperlipidemia    3. Mixed anxiety and depressive disorder    4. Gastroesophageal reflux disease without esophagitis    5. Palpitations    6. Panic attack    7. Chest pain syndrome    8. Tortuous aorta    9. Severe obesity with body mass index (BMI) of 35.0 to 35.9 and comorbidity    10. CORRIGAN (dyspnea on exertion)    11. Chronic fatigue      Elevated ca score without any  ischemia clinically and by non invasive testing contin ue rf modification diet compliance weight loss continue asa statins.  Hlp near target will discuss again weight loss rto help bring ldl less than 70 as wella s diet compliance.  Obesity discussed weight loss exercise  Chronic fatigue snoring has sleep apnea clinically  needs to have formal sleep study since home one was not revealing will refer back to pulmonary   Asymptomatic pvd with normal vascular exam continue asa statins walking exercise and weight loss.  Plan:     Refer to sleep team to address issues of sleep apne that she has clinically

## 2024-02-22 LAB
OHS QRS DURATION: 70 MS
OHS QTC CALCULATION: 418 MS

## 2024-02-22 NOTE — PROGRESS NOTES
===============================  Date today is 2/26/2024  Mouna Chandra is a 69 y.o. female  Last visit [facility]: :2/17/2023   Last visit eye dept. 10/4/2023    Corrected distance visual acuity was 20/40 in the right eye and 20/30 in the left eye.  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  Check OU independently daily.    Gave my office and personal cell phone number.  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 Check OU daily.   Gave my office and cell phone number.    =============================

## 2024-02-26 ENCOUNTER — OFFICE VISIT (OUTPATIENT)
Dept: OPHTHALMOLOGY | Facility: CLINIC | Age: 69
End: 2024-02-26
Payer: MEDICARE

## 2024-02-26 DIAGNOSIS — H35.363 FAMILIAL DRUSEN OF BOTH EYES: Primary | ICD-10-CM

## 2024-02-26 DIAGNOSIS — H35.3133 ADVANCED ATROPHIC NONEXUDATIVE AGE-RELATED MACULAR DEGENERATION OF BOTH EYES WITHOUT SUBFOVEAL INVOLVEMENT: ICD-10-CM

## 2024-02-26 PROCEDURE — 1160F RVW MEDS BY RX/DR IN RCRD: CPT | Mod: HCNC,CPTII,S$GLB, | Performed by: OPHTHALMOLOGY

## 2024-02-26 PROCEDURE — 1157F ADVNC CARE PLAN IN RCRD: CPT | Mod: HCNC,CPTII,S$GLB, | Performed by: OPHTHALMOLOGY

## 2024-02-26 PROCEDURE — 99999 PR PBB SHADOW E&M-EST. PATIENT-LVL III: CPT | Mod: PBBFAC,HCNC,, | Performed by: OPHTHALMOLOGY

## 2024-02-26 PROCEDURE — 1126F AMNT PAIN NOTED NONE PRSNT: CPT | Mod: HCNC,CPTII,S$GLB, | Performed by: OPHTHALMOLOGY

## 2024-02-26 PROCEDURE — 1159F MED LIST DOCD IN RCRD: CPT | Mod: HCNC,CPTII,S$GLB, | Performed by: OPHTHALMOLOGY

## 2024-02-26 PROCEDURE — 92134 CPTRZ OPH DX IMG PST SGM RTA: CPT | Mod: HCNC,S$GLB,, | Performed by: OPHTHALMOLOGY

## 2024-02-26 PROCEDURE — 99214 OFFICE O/P EST MOD 30 MIN: CPT | Mod: HCNC,S$GLB,, | Performed by: OPHTHALMOLOGY

## 2024-02-27 ENCOUNTER — PATIENT MESSAGE (OUTPATIENT)
Dept: ADMINISTRATIVE | Facility: CLINIC | Age: 69
End: 2024-02-27
Payer: MEDICARE

## 2024-03-20 DIAGNOSIS — M54.30 SCIATIC LEG PAIN: ICD-10-CM

## 2024-03-20 RX ORDER — GABAPENTIN 100 MG/1
CAPSULE ORAL
Qty: 90 CAPSULE | Refills: 11 | Status: SHIPPED | OUTPATIENT
Start: 2024-03-20

## 2024-04-16 ENCOUNTER — LAB VISIT (OUTPATIENT)
Dept: LAB | Facility: HOSPITAL | Age: 69
End: 2024-04-16
Attending: PHYSICIAN ASSISTANT
Payer: MEDICARE

## 2024-04-16 ENCOUNTER — OFFICE VISIT (OUTPATIENT)
Dept: RHEUMATOLOGY | Facility: CLINIC | Age: 69
End: 2024-04-16
Payer: MEDICARE

## 2024-04-16 VITALS
WEIGHT: 177.5 LBS | HEART RATE: 75 BPM | HEIGHT: 61 IN | DIASTOLIC BLOOD PRESSURE: 76 MMHG | BODY MASS INDEX: 33.51 KG/M2 | SYSTOLIC BLOOD PRESSURE: 147 MMHG

## 2024-04-16 DIAGNOSIS — M85.80 OSTEOPENIA WITH HIGH RISK OF FRACTURE: ICD-10-CM

## 2024-04-16 DIAGNOSIS — S02.5XXA CLOSED FRACTURE OF TOOTH, INITIAL ENCOUNTER: ICD-10-CM

## 2024-04-16 DIAGNOSIS — M85.80 OSTEOPENIA WITH HIGH RISK OF FRACTURE: Primary | ICD-10-CM

## 2024-04-16 DIAGNOSIS — Z51.81 MEDICATION MONITORING ENCOUNTER: ICD-10-CM

## 2024-04-16 LAB
ALBUMIN SERPL BCP-MCNC: 3.9 G/DL (ref 3.5–5.2)
ALP SERPL-CCNC: 60 U/L (ref 55–135)
ALT SERPL W/O P-5'-P-CCNC: 23 U/L (ref 10–44)
ANION GAP SERPL CALC-SCNC: 8 MMOL/L (ref 8–16)
AST SERPL-CCNC: 22 U/L (ref 10–40)
BILIRUB SERPL-MCNC: 0.5 MG/DL (ref 0.1–1)
BUN SERPL-MCNC: 18 MG/DL (ref 8–23)
CALCIUM SERPL-MCNC: 9.3 MG/DL (ref 8.7–10.5)
CHLORIDE SERPL-SCNC: 107 MMOL/L (ref 95–110)
CO2 SERPL-SCNC: 27 MMOL/L (ref 23–29)
CREAT SERPL-MCNC: 1 MG/DL (ref 0.5–1.4)
EST. GFR  (NO RACE VARIABLE): >60 ML/MIN/1.73 M^2
GLUCOSE SERPL-MCNC: 95 MG/DL (ref 70–110)
POTASSIUM SERPL-SCNC: 4.5 MMOL/L (ref 3.5–5.1)
PROT SERPL-MCNC: 7.3 G/DL (ref 6–8.4)
SODIUM SERPL-SCNC: 142 MMOL/L (ref 136–145)

## 2024-04-16 PROCEDURE — 1159F MED LIST DOCD IN RCRD: CPT | Mod: HCNC,CPTII,S$GLB, | Performed by: PHYSICIAN ASSISTANT

## 2024-04-16 PROCEDURE — 99214 OFFICE O/P EST MOD 30 MIN: CPT | Mod: HCNC,S$GLB,, | Performed by: PHYSICIAN ASSISTANT

## 2024-04-16 PROCEDURE — 3078F DIAST BP <80 MM HG: CPT | Mod: HCNC,CPTII,S$GLB, | Performed by: PHYSICIAN ASSISTANT

## 2024-04-16 PROCEDURE — 1125F AMNT PAIN NOTED PAIN PRSNT: CPT | Mod: HCNC,CPTII,S$GLB, | Performed by: PHYSICIAN ASSISTANT

## 2024-04-16 PROCEDURE — 1157F ADVNC CARE PLAN IN RCRD: CPT | Mod: HCNC,CPTII,S$GLB, | Performed by: PHYSICIAN ASSISTANT

## 2024-04-16 PROCEDURE — 99999 PR PBB SHADOW E&M-EST. PATIENT-LVL IV: CPT | Mod: PBBFAC,HCNC,, | Performed by: PHYSICIAN ASSISTANT

## 2024-04-16 PROCEDURE — 3288F FALL RISK ASSESSMENT DOCD: CPT | Mod: HCNC,CPTII,S$GLB, | Performed by: PHYSICIAN ASSISTANT

## 2024-04-16 PROCEDURE — 3008F BODY MASS INDEX DOCD: CPT | Mod: HCNC,CPTII,S$GLB, | Performed by: PHYSICIAN ASSISTANT

## 2024-04-16 PROCEDURE — 1101F PT FALLS ASSESS-DOCD LE1/YR: CPT | Mod: HCNC,CPTII,S$GLB, | Performed by: PHYSICIAN ASSISTANT

## 2024-04-16 PROCEDURE — 80053 COMPREHEN METABOLIC PANEL: CPT | Mod: HCNC | Performed by: PHYSICIAN ASSISTANT

## 2024-04-16 PROCEDURE — 82306 VITAMIN D 25 HYDROXY: CPT | Mod: HCNC | Performed by: PHYSICIAN ASSISTANT

## 2024-04-16 PROCEDURE — 36415 COLL VENOUS BLD VENIPUNCTURE: CPT | Mod: HCNC | Performed by: PHYSICIAN ASSISTANT

## 2024-04-16 PROCEDURE — 1160F RVW MEDS BY RX/DR IN RCRD: CPT | Mod: HCNC,CPTII,S$GLB, | Performed by: PHYSICIAN ASSISTANT

## 2024-04-16 PROCEDURE — 3077F SYST BP >= 140 MM HG: CPT | Mod: HCNC,CPTII,S$GLB, | Performed by: PHYSICIAN ASSISTANT
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
## 2024-04-17 LAB — 25(OH)D3+25(OH)D2 SERPL-MCNC: 41 NG/ML (ref 30–96)

## 2024-04-24 ENCOUNTER — LAB VISIT (OUTPATIENT)
Dept: LAB | Facility: HOSPITAL | Age: 69
End: 2024-04-24
Attending: FAMILY MEDICINE
Payer: MEDICARE

## 2024-04-24 DIAGNOSIS — F33.41 RECURRENT MAJOR DEPRESSIVE DISORDER, IN PARTIAL REMISSION: ICD-10-CM

## 2024-04-24 DIAGNOSIS — E66.01 SEVERE OBESITY WITH BODY MASS INDEX (BMI) OF 35.0 TO 35.9 AND COMORBIDITY: ICD-10-CM

## 2024-04-24 DIAGNOSIS — I25.10 ATHEROSCLEROSIS OF CORONARY ARTERY, UNSPECIFIED VESSEL OR LESION TYPE, UNSPECIFIED WHETHER ANGINA PRESENT, UNSPECIFIED WHETHER NATIVE OR TRANSPLANTED HEART: ICD-10-CM

## 2024-04-24 LAB
ALBUMIN SERPL BCP-MCNC: 3.8 G/DL (ref 3.5–5.2)
ALP SERPL-CCNC: 57 U/L (ref 55–135)
ALT SERPL W/O P-5'-P-CCNC: 19 U/L (ref 10–44)
ANION GAP SERPL CALC-SCNC: 8 MMOL/L (ref 8–16)
AST SERPL-CCNC: 22 U/L (ref 10–40)
BILIRUB SERPL-MCNC: 0.6 MG/DL (ref 0.1–1)
BUN SERPL-MCNC: 18 MG/DL (ref 8–23)
CALCIUM SERPL-MCNC: 9.3 MG/DL (ref 8.7–10.5)
CHLORIDE SERPL-SCNC: 105 MMOL/L (ref 95–110)
CO2 SERPL-SCNC: 26 MMOL/L (ref 23–29)
CREAT SERPL-MCNC: 0.8 MG/DL (ref 0.5–1.4)
EST. GFR  (NO RACE VARIABLE): >60 ML/MIN/1.73 M^2
GLUCOSE SERPL-MCNC: 90 MG/DL (ref 70–110)
POTASSIUM SERPL-SCNC: 4.4 MMOL/L (ref 3.5–5.1)
PROT SERPL-MCNC: 7.3 G/DL (ref 6–8.4)
SODIUM SERPL-SCNC: 139 MMOL/L (ref 136–145)
TSH SERPL DL<=0.005 MIU/L-ACNC: 0.9 UIU/ML (ref 0.4–4)

## 2024-04-24 PROCEDURE — 84443 ASSAY THYROID STIM HORMONE: CPT | Mod: HCNC | Performed by: FAMILY MEDICINE

## 2024-04-24 PROCEDURE — 80053 COMPREHEN METABOLIC PANEL: CPT | Mod: HCNC | Performed by: FAMILY MEDICINE

## 2024-04-24 PROCEDURE — 36415 COLL VENOUS BLD VENIPUNCTURE: CPT | Mod: HCNC,PO | Performed by: FAMILY MEDICINE

## 2024-04-25 NOTE — PROGRESS NOTES
"Subjective:      Patient ID: Mouna Chandra is a 69 y.o. female.    Chief Complaint: Follow-up (3 month follow up)      HPI  Here for follow up of medical problems.  Has retired, feels some sadness.  Working in the yard.  Goes to the gym 3-4d per week.  Paxil/trazodone doing well, feels like good dose.  BMs normal.  No cp/sob/palp.    Updated/ annual due 7/24:  HM: 9/23 fluvax, 3/17 [id number], 6/21 [id number], 11/16 Tdap, 10/16 zostavax, 6/21 Shingrix x2, 6/22 BMD on fosamax rep 2y, 1/15 Cscope rep due 10y, 7/23 MMG, 1/18 Eye Dr. Tobin, 5/21 HCV neg.     Review of Systems   Constitutional:  Negative for chills, diaphoresis and fever.   Respiratory:  Negative for cough and shortness of breath.    Cardiovascular:  Negative for chest pain, palpitations and leg swelling.   Gastrointestinal:  Negative for blood in stool, constipation, diarrhea, nausea and vomiting.   Genitourinary:  Negative for dysuria, frequency and hematuria.   Psychiatric/Behavioral:  The patient is not nervous/anxious.          Objective:   BP (!) 124/58 (BP Location: Left arm, Patient Position: Sitting)   Pulse 73   Temp 98.3 °F (36.8 °C) (Oral)   Ht 5' 1" (1.549 m)   Wt 80.2 kg (176 lb 11.2 oz)   SpO2 97%   BMI 33.39 kg/m²     Physical Exam  Constitutional:       Appearance: She is well-developed.   Neck:      Thyroid: No thyroid mass.      Vascular: No carotid bruit.   Cardiovascular:      Rate and Rhythm: Normal rate and regular rhythm.      Heart sounds: No murmur heard.     No friction rub. No gallop.   Pulmonary:      Effort: Pulmonary effort is normal.      Breath sounds: Normal breath sounds. No wheezing or rales.   Abdominal:      General: Bowel sounds are normal.      Palpations: Abdomen is soft. There is no mass.      Tenderness: There is no abdominal tenderness.   Musculoskeletal:      Cervical back: Neck supple.   Lymphadenopathy:      Cervical: No cervical adenopathy.   Neurological:      Mental Status: She is alert " and oriented to person, place, and time.            Latest Reference Range & Units 04/16/24 13:00 04/24/24 10:50   Sodium 136 - 145 mmol/L 142 139   Potassium 3.5 - 5.1 mmol/L 4.5 4.4   Chloride 95 - 110 mmol/L 107 105   CO2 23 - 29 mmol/L 27 26   Anion Gap 8 - 16 mmol/L 8 8   BUN 8 - 23 mg/dL 18 18   Creatinine 0.5 - 1.4 mg/dL 1.0 0.8   eGFR >60 mL/min/1.73 m^2 >60 >60.0   Glucose 70 - 110 mg/dL 95 90   Calcium 8.7 - 10.5 mg/dL 9.3 9.3   ALP 55 - 135 U/L 60 57   PROTEIN TOTAL 6.0 - 8.4 g/dL 7.3 7.3   Albumin 3.5 - 5.2 g/dL 3.9 3.8   BILIRUBIN TOTAL 0.1 - 1.0 mg/dL 0.5 0.6   AST 10 - 40 U/L 22 22   ALT 10 - 44 U/L 23 19   Vitamin D 30 - 96 ng/mL 41    TSH 0.400 - 4.000 uIU/mL  0.899     Assessment:       1. Mixed hyperlipidemia    2. Recurrent major depressive disorder, in partial remission    3. Severe obesity with body mass index (BMI) of 35.0 to 35.9 and comorbidity    4. Elevated coronary artery calcium score    5. Tortuous aorta    6. Age-related osteoporosis without current pathological fracture    7. Gastroesophageal reflux disease without esophagitis          Plan:     Elevated coronary artery calcium score, Tortuous aorta, Mixed hyperlipidemia- cont atorva/zetia, recheck 3mo.  -     CBC Auto Differential; Future; Expected date: 05/03/2024  -     Comprehensive Metabolic Panel; Future; Expected date: 05/03/2024  -     Lipid Panel; Future; Expected date: 05/03/2024  -     TSH; Future; Expected date: 05/03/2024    Recurrent major depressive disorder, in partial remission- doing well, cont rx.    Severe obesity with body mass index (BMI) of 35.0 to 35.9 and comorbidity- cont exercise/diet.    Age-related osteoporosis without current pathological fracture- on fosamax, BMD in July, sees Rheum.    Gastroesophageal reflux disease without esophagitis- cont PPI.

## 2024-05-03 ENCOUNTER — OFFICE VISIT (OUTPATIENT)
Dept: PRIMARY CARE CLINIC | Facility: CLINIC | Age: 69
End: 2024-05-03
Payer: MEDICARE

## 2024-05-03 VITALS
TEMPERATURE: 98 F | OXYGEN SATURATION: 97 % | WEIGHT: 176.69 LBS | HEIGHT: 61 IN | BODY MASS INDEX: 33.36 KG/M2 | DIASTOLIC BLOOD PRESSURE: 58 MMHG | HEART RATE: 73 BPM | SYSTOLIC BLOOD PRESSURE: 124 MMHG

## 2024-05-03 DIAGNOSIS — E78.2 MIXED HYPERLIPIDEMIA: Primary | Chronic | ICD-10-CM

## 2024-05-03 DIAGNOSIS — K21.9 GASTROESOPHAGEAL REFLUX DISEASE WITHOUT ESOPHAGITIS: ICD-10-CM

## 2024-05-03 DIAGNOSIS — F33.41 RECURRENT MAJOR DEPRESSIVE DISORDER, IN PARTIAL REMISSION: ICD-10-CM

## 2024-05-03 DIAGNOSIS — M81.0 AGE-RELATED OSTEOPOROSIS WITHOUT CURRENT PATHOLOGICAL FRACTURE: ICD-10-CM

## 2024-05-03 DIAGNOSIS — I77.1 TORTUOUS AORTA: ICD-10-CM

## 2024-05-03 DIAGNOSIS — E66.01 SEVERE OBESITY WITH BODY MASS INDEX (BMI) OF 35.0 TO 35.9 AND COMORBIDITY: ICD-10-CM

## 2024-05-03 DIAGNOSIS — R93.1 ELEVATED CORONARY ARTERY CALCIUM SCORE: ICD-10-CM

## 2024-05-03 PROCEDURE — 1159F MED LIST DOCD IN RCRD: CPT | Mod: HCNC,CPTII,S$GLB, | Performed by: INTERNAL MEDICINE

## 2024-05-03 PROCEDURE — 1157F ADVNC CARE PLAN IN RCRD: CPT | Mod: HCNC,CPTII,S$GLB, | Performed by: INTERNAL MEDICINE

## 2024-05-03 PROCEDURE — 99214 OFFICE O/P EST MOD 30 MIN: CPT | Mod: HCNC,S$GLB,, | Performed by: INTERNAL MEDICINE

## 2024-05-03 PROCEDURE — 3074F SYST BP LT 130 MM HG: CPT | Mod: HCNC,CPTII,S$GLB, | Performed by: INTERNAL MEDICINE

## 2024-05-03 PROCEDURE — 3288F FALL RISK ASSESSMENT DOCD: CPT | Mod: HCNC,CPTII,S$GLB, | Performed by: INTERNAL MEDICINE

## 2024-05-03 PROCEDURE — 99999 PR PBB SHADOW E&M-EST. PATIENT-LVL IV: CPT | Mod: PBBFAC,HCNC,, | Performed by: INTERNAL MEDICINE

## 2024-05-03 PROCEDURE — 3008F BODY MASS INDEX DOCD: CPT | Mod: HCNC,CPTII,S$GLB, | Performed by: INTERNAL MEDICINE

## 2024-05-03 PROCEDURE — 3078F DIAST BP <80 MM HG: CPT | Mod: HCNC,CPTII,S$GLB, | Performed by: INTERNAL MEDICINE

## 2024-05-03 PROCEDURE — 1126F AMNT PAIN NOTED NONE PRSNT: CPT | Mod: HCNC,CPTII,S$GLB, | Performed by: INTERNAL MEDICINE

## 2024-05-03 PROCEDURE — 1101F PT FALLS ASSESS-DOCD LE1/YR: CPT | Mod: HCNC,CPTII,S$GLB, | Performed by: INTERNAL MEDICINE

## 2024-07-14 DIAGNOSIS — I25.10 CORONARY ARTERY CALCIFICATION SEEN ON CT SCAN: ICD-10-CM

## 2024-07-14 DIAGNOSIS — F32.A ANXIETY AND DEPRESSION: ICD-10-CM

## 2024-07-14 DIAGNOSIS — F41.9 ANXIETY AND DEPRESSION: ICD-10-CM

## 2024-07-15 RX ORDER — PAROXETINE HYDROCHLORIDE 40 MG/1
TABLET, FILM COATED ORAL
Qty: 90 TABLET | Refills: 3 | Status: SHIPPED | OUTPATIENT
Start: 2024-07-15

## 2024-07-15 RX ORDER — ATORVASTATIN CALCIUM 40 MG/1
40 TABLET, FILM COATED ORAL
Qty: 90 TABLET | Refills: 3 | Status: SHIPPED | OUTPATIENT
Start: 2024-07-15

## 2024-07-17 ENCOUNTER — HOSPITAL ENCOUNTER (OUTPATIENT)
Dept: RADIOLOGY | Facility: HOSPITAL | Age: 69
Discharge: HOME | End: 2024-07-17
Attending: INTERNAL MEDICINE
Payer: MEDICARE

## 2024-07-17 DIAGNOSIS — Z12.31 ENCOUNTER FOR SCREENING MAMMOGRAM FOR BREAST CANCER: ICD-10-CM

## 2024-07-17 PROCEDURE — 77063 BREAST TOMOSYNTHESIS BI: CPT | Mod: 26,HCNC,, | Performed by: RADIOLOGY

## 2024-07-17 PROCEDURE — 77063 BREAST TOMOSYNTHESIS BI: CPT | Mod: TC,HCNC

## 2024-07-17 PROCEDURE — 77067 SCR MAMMO BI INCL CAD: CPT | Mod: 26,HCNC,, | Performed by: RADIOLOGY

## 2024-07-31 ENCOUNTER — PATIENT MESSAGE (OUTPATIENT)
Dept: OPHTHALMOLOGY | Facility: CLINIC | Age: 69
End: 2024-07-31
Payer: MEDICARE

## 2024-08-12 RX ORDER — BUSPIRONE HYDROCHLORIDE 7.5 MG/1
TABLET ORAL
Qty: 60 TABLET | Refills: 11 | Status: SHIPPED | OUTPATIENT
Start: 2024-08-12

## 2024-09-25 DIAGNOSIS — Z00.00 ENCOUNTER FOR MEDICARE ANNUAL WELLNESS EXAM: ICD-10-CM

## 2024-10-21 ENCOUNTER — OFFICE VISIT (OUTPATIENT)
Dept: RHEUMATOLOGY | Facility: CLINIC | Age: 69
End: 2024-10-21
Payer: MEDICARE

## 2024-10-21 VITALS
WEIGHT: 184.31 LBS | SYSTOLIC BLOOD PRESSURE: 144 MMHG | HEART RATE: 73 BPM | BODY MASS INDEX: 34.8 KG/M2 | HEIGHT: 61 IN | DIASTOLIC BLOOD PRESSURE: 70 MMHG

## 2024-10-21 DIAGNOSIS — M85.80 OSTEOPENIA WITH HIGH RISK OF FRACTURE: ICD-10-CM

## 2024-10-21 PROCEDURE — 99214 OFFICE O/P EST MOD 30 MIN: CPT | Mod: HCNC,S$GLB,, | Performed by: STUDENT IN AN ORGANIZED HEALTH CARE EDUCATION/TRAINING PROGRAM

## 2024-10-21 PROCEDURE — 3077F SYST BP >= 140 MM HG: CPT | Mod: HCNC,CPTII,S$GLB, | Performed by: STUDENT IN AN ORGANIZED HEALTH CARE EDUCATION/TRAINING PROGRAM

## 2024-10-21 PROCEDURE — 3288F FALL RISK ASSESSMENT DOCD: CPT | Mod: HCNC,CPTII,S$GLB, | Performed by: STUDENT IN AN ORGANIZED HEALTH CARE EDUCATION/TRAINING PROGRAM

## 2024-10-21 PROCEDURE — 1101F PT FALLS ASSESS-DOCD LE1/YR: CPT | Mod: HCNC,CPTII,S$GLB, | Performed by: STUDENT IN AN ORGANIZED HEALTH CARE EDUCATION/TRAINING PROGRAM

## 2024-10-21 PROCEDURE — 99999 PR PBB SHADOW E&M-EST. PATIENT-LVL IV: CPT | Mod: PBBFAC,HCNC,, | Performed by: STUDENT IN AN ORGANIZED HEALTH CARE EDUCATION/TRAINING PROGRAM

## 2024-10-21 PROCEDURE — 1159F MED LIST DOCD IN RCRD: CPT | Mod: HCNC,CPTII,S$GLB, | Performed by: STUDENT IN AN ORGANIZED HEALTH CARE EDUCATION/TRAINING PROGRAM

## 2024-10-21 PROCEDURE — 1157F ADVNC CARE PLAN IN RCRD: CPT | Mod: HCNC,CPTII,S$GLB, | Performed by: STUDENT IN AN ORGANIZED HEALTH CARE EDUCATION/TRAINING PROGRAM

## 2024-10-21 PROCEDURE — 3078F DIAST BP <80 MM HG: CPT | Mod: HCNC,CPTII,S$GLB, | Performed by: STUDENT IN AN ORGANIZED HEALTH CARE EDUCATION/TRAINING PROGRAM

## 2024-10-21 PROCEDURE — 3008F BODY MASS INDEX DOCD: CPT | Mod: HCNC,CPTII,S$GLB, | Performed by: STUDENT IN AN ORGANIZED HEALTH CARE EDUCATION/TRAINING PROGRAM

## 2024-10-21 PROCEDURE — 1160F RVW MEDS BY RX/DR IN RCRD: CPT | Mod: HCNC,CPTII,S$GLB, | Performed by: STUDENT IN AN ORGANIZED HEALTH CARE EDUCATION/TRAINING PROGRAM

## 2024-10-21 PROCEDURE — 1125F AMNT PAIN NOTED PAIN PRSNT: CPT | Mod: HCNC,CPTII,S$GLB, | Performed by: STUDENT IN AN ORGANIZED HEALTH CARE EDUCATION/TRAINING PROGRAM

## 2024-10-21 RX ORDER — ALENDRONATE SODIUM 70 MG/1
70 TABLET ORAL
Qty: 4 TABLET | Refills: 11 | Status: SHIPPED | OUTPATIENT
Start: 2024-10-21
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
## 2024-10-21 NOTE — PATIENT INSTRUCTIONS
Stay on Fosamax.  Follow up in 1 year.  Repeat bone density scan in 07/2026. At that point, decide about Fosamax.

## 2024-10-29 ENCOUNTER — OFFICE VISIT (OUTPATIENT)
Dept: PRIMARY CARE CLINIC | Facility: CLINIC | Age: 69
End: 2024-10-29
Payer: MEDICARE

## 2024-10-29 ENCOUNTER — TELEPHONE (OUTPATIENT)
Dept: PRIMARY CARE CLINIC | Facility: CLINIC | Age: 69
End: 2024-10-29
Payer: MEDICARE

## 2024-10-29 VITALS
TEMPERATURE: 97 F | BODY MASS INDEX: 35.09 KG/M2 | SYSTOLIC BLOOD PRESSURE: 138 MMHG | OXYGEN SATURATION: 98 % | HEIGHT: 61 IN | WEIGHT: 185.88 LBS | DIASTOLIC BLOOD PRESSURE: 68 MMHG | HEART RATE: 70 BPM

## 2024-10-29 DIAGNOSIS — F41.0 PANIC ATTACK: Primary | ICD-10-CM

## 2024-10-29 DIAGNOSIS — Z23 NEED FOR VACCINATION: ICD-10-CM

## 2024-10-29 DIAGNOSIS — F41.8 MIXED ANXIETY AND DEPRESSIVE DISORDER: ICD-10-CM

## 2024-10-29 DIAGNOSIS — E78.2 MIXED HYPERLIPIDEMIA: Chronic | ICD-10-CM

## 2024-10-29 DIAGNOSIS — F51.04 PSYCHOPHYSIOLOGICAL INSOMNIA: ICD-10-CM

## 2024-10-29 DIAGNOSIS — F51.01 PRIMARY INSOMNIA: ICD-10-CM

## 2024-10-29 DIAGNOSIS — M81.0 AGE-RELATED OSTEOPOROSIS WITHOUT CURRENT PATHOLOGICAL FRACTURE: ICD-10-CM

## 2024-10-29 DIAGNOSIS — L30.9 ACUTE DERMATITIS: ICD-10-CM

## 2024-10-29 DIAGNOSIS — F33.41 RECURRENT MAJOR DEPRESSIVE DISORDER, IN PARTIAL REMISSION: ICD-10-CM

## 2024-10-29 PROBLEM — E66.01 SEVERE OBESITY WITH BODY MASS INDEX (BMI) OF 35.0 TO 35.9 AND COMORBIDITY: Status: RESOLVED | Noted: 2022-05-18 | Resolved: 2024-10-29

## 2024-10-29 PROCEDURE — 3288F FALL RISK ASSESSMENT DOCD: CPT | Mod: HCNC,CPTII,S$GLB, | Performed by: NURSE PRACTITIONER

## 2024-10-29 PROCEDURE — 1101F PT FALLS ASSESS-DOCD LE1/YR: CPT | Mod: HCNC,CPTII,S$GLB, | Performed by: NURSE PRACTITIONER

## 2024-10-29 PROCEDURE — 1157F ADVNC CARE PLAN IN RCRD: CPT | Mod: HCNC,CPTII,S$GLB, | Performed by: NURSE PRACTITIONER

## 2024-10-29 PROCEDURE — 1125F AMNT PAIN NOTED PAIN PRSNT: CPT | Mod: HCNC,CPTII,S$GLB, | Performed by: NURSE PRACTITIONER

## 2024-10-29 PROCEDURE — G0008 ADMIN INFLUENZA VIRUS VAC: HCPCS | Mod: HCNC,S$GLB,, | Performed by: NURSE PRACTITIONER

## 2024-10-29 PROCEDURE — 90653 IIV ADJUVANT VACCINE IM: CPT | Mod: HCNC,S$GLB,, | Performed by: NURSE PRACTITIONER

## 2024-10-29 PROCEDURE — 3075F SYST BP GE 130 - 139MM HG: CPT | Mod: HCNC,CPTII,S$GLB, | Performed by: NURSE PRACTITIONER

## 2024-10-29 PROCEDURE — 99215 OFFICE O/P EST HI 40 MIN: CPT | Mod: HCNC,S$GLB,, | Performed by: NURSE PRACTITIONER

## 2024-10-29 PROCEDURE — 3008F BODY MASS INDEX DOCD: CPT | Mod: HCNC,CPTII,S$GLB, | Performed by: NURSE PRACTITIONER

## 2024-10-29 PROCEDURE — 99999 PR PBB SHADOW E&M-EST. PATIENT-LVL V: CPT | Mod: PBBFAC,HCNC,, | Performed by: NURSE PRACTITIONER

## 2024-10-29 PROCEDURE — 1159F MED LIST DOCD IN RCRD: CPT | Mod: HCNC,CPTII,S$GLB, | Performed by: NURSE PRACTITIONER

## 2024-10-29 PROCEDURE — 3078F DIAST BP <80 MM HG: CPT | Mod: HCNC,CPTII,S$GLB, | Performed by: NURSE PRACTITIONER

## 2024-10-29 RX ORDER — BUSPIRONE HYDROCHLORIDE 7.5 MG/1
7.5 TABLET ORAL 3 TIMES DAILY PRN
Qty: 60 TABLET | Refills: 11 | Status: SHIPPED | OUTPATIENT
Start: 2024-10-29

## 2024-10-29 RX ORDER — TRAZODONE HYDROCHLORIDE 50 MG/1
TABLET ORAL
Qty: 45 TABLET | Refills: 11 | Status: SHIPPED | OUTPATIENT
Start: 2024-10-29

## 2024-11-07 ENCOUNTER — PATIENT MESSAGE (OUTPATIENT)
Dept: PRIMARY CARE CLINIC | Facility: CLINIC | Age: 69
End: 2024-11-07
Payer: MEDICARE

## 2024-12-16 ENCOUNTER — PATIENT MESSAGE (OUTPATIENT)
Dept: PRIMARY CARE CLINIC | Facility: CLINIC | Age: 69
End: 2024-12-16
Payer: MEDICARE

## 2025-01-14 DIAGNOSIS — R93.1 ELEVATED CORONARY ARTERY CALCIUM SCORE: ICD-10-CM

## 2025-01-14 DIAGNOSIS — I25.10 ATHEROSCLEROSIS OF CORONARY ARTERY, UNSPECIFIED VESSEL OR LESION TYPE, UNSPECIFIED WHETHER ANGINA PRESENT, UNSPECIFIED WHETHER NATIVE OR TRANSPLANTED HEART: ICD-10-CM

## 2025-01-14 DIAGNOSIS — I77.1 TORTUOUS AORTA: ICD-10-CM

## 2025-01-14 RX ORDER — ASPIRIN 81 MG/1
81 TABLET ORAL DAILY
Qty: 90 TABLET | Refills: 3 | Status: SHIPPED | OUTPATIENT
Start: 2025-01-14 | End: 2026-01-14

## 2025-01-14 RX ORDER — EZETIMIBE 10 MG/1
10 TABLET ORAL DAILY
Qty: 90 TABLET | Refills: 3 | Status: SHIPPED | OUTPATIENT
Start: 2025-01-14 | End: 2026-01-14

## 2025-01-28 ENCOUNTER — TELEPHONE (OUTPATIENT)
Dept: PRIMARY CARE CLINIC | Facility: CLINIC | Age: 70
End: 2025-01-28
Payer: MEDICARE
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
## 2025-02-21 DIAGNOSIS — Z00.00 ENCOUNTER FOR MEDICARE ANNUAL WELLNESS EXAM: ICD-10-CM

## 2025-02-28 NOTE — PROGRESS NOTES
Subjective:      Patient ID: Mouna Chandra is a 70 y.o. female.    Chief Complaint: Follow-up (3 month f/u ), Leg Pain (When standing (right leg)), and Foot Pain (Left foot swelling)      HPI  Here for f/u medical problems and preventive exam.  Stopped fosamax 1/25, due to GI reflux sx.  Was exercising regularly until sister-in-law got sick.  No f/c/sw/cough.  No cp/sob/palp.  BMs and urine normal.  Paxil doing well, thinks doing well with depression.  Buspar prn.  Has sciatic nerve pain on and off.  4-6 weeks left shoulder pain.  Also some pains generalized, ibuprofen helps.  Used to do accupuncture with good relief.              Advance Care Planning     Date: 03/19/2025    ACP Reviewed/No Changes  Voluntary advance care planning discussion had today with patient. Previously completed HCPOA in electronic medical record is current, no changes made.      A total of 5 min was spent on advance care planning, goals of care discussion, emotional support, formulating and communicating prognosis and exploring burden/benefit of various approaches of treatment. This discussion occurred on a fully voluntary basis with the verbal consent of the patient and/or family.           HM: 10/24 fluvax, 3/17 [id number], 6/21 [id number], 11/16 Tdap, 10/16 zostavax, 6/21 Shingrix x2, 7/24 BMD on fosamax x 2.5y then holiday starting 1/25 rep 2y, 1/15 Cscope rep due 10y, 7/24 MMG, 1/18 Eye Dr. Tobin, 5/21 HCV neg.     Review of Systems   Constitutional:  Negative for appetite change, chills, diaphoresis and fever.   HENT:  Negative for congestion, ear pain, rhinorrhea, sinus pressure and sore throat.    Respiratory:  Negative for cough, chest tightness and shortness of breath.    Cardiovascular:  Negative for chest pain and palpitations.   Gastrointestinal:  Negative for blood in stool, constipation, diarrhea, nausea and vomiting.   Genitourinary:  Negative for dysuria, frequency, hematuria, menstrual problem, urgency and  "vaginal discharge.   Musculoskeletal:  Negative for arthralgias.   Skin:  Negative for rash.   Neurological:  Negative for dizziness and headaches.   Psychiatric/Behavioral:  Negative for sleep disturbance. The patient is not nervous/anxious.          Objective:   BP (!) 118/58 (BP Location: Right arm, Patient Position: Sitting)   Pulse 76   Temp 97.6 °F (36.4 °C) (Skin)   Ht 5' 1" (1.549 m)   Wt 85.9 kg (189 lb 6 oz)   SpO2 97%   BMI 35.78 kg/m²     Physical Exam  Constitutional:       Appearance: She is well-developed.   HENT:      Right Ear: External ear normal. Tympanic membrane is not injected.      Left Ear: External ear normal. Tympanic membrane is not injected.   Eyes:      Conjunctiva/sclera: Conjunctivae normal.   Neck:      Thyroid: No thyromegaly.   Cardiovascular:      Rate and Rhythm: Normal rate and regular rhythm.      Heart sounds: No murmur heard.     No friction rub. No gallop.   Pulmonary:      Effort: Pulmonary effort is normal.      Breath sounds: Normal breath sounds. No wheezing or rales.   Abdominal:      General: Bowel sounds are normal.      Palpations: Abdomen is soft. There is no mass.      Tenderness: There is no abdominal tenderness.   Musculoskeletal:      Cervical back: Normal range of motion and neck supple.   Lymphadenopathy:      Cervical: No cervical adenopathy.   Skin:     General: Skin is warm.      Findings: No rash.   Neurological:      Mental Status: She is alert and oriented to person, place, and time.             Assessment:       1. Mixed hyperlipidemia    2. Elevated coronary artery calcium score    3. Recurrent major depressive disorder, in partial remission    4. Panic attack    5. Tortuous aorta    6. Multiple lung nodules on CT    7. Age-related osteoporosis without current pathological fracture    8. Left shoulder pain, unspecified chronicity    9. Screen for colon cancer    10. Sciatica of right side    11. FH: diabetes mellitus          Plan:     1. Mixed " hyperlipidemia- cont statin, recheck lab now.  Overview:  Atorva 40mg daily,  Zetia 10mg daily.    Orders:  -     CBC Auto Differential; Future; Expected date: 03/19/2025  -     Comprehensive Metabolic Panel; Future; Expected date: 03/19/2025  -     Lipid Panel; Future; Expected date: 03/19/2025  -     TSH; Future; Expected date: 03/19/2025    2. Elevated coronary artery calcium score- cont statin/asa.  Overview:  7/23 CT calcium:  Your total calcium score is 754.    On statin/ ASA.    3. Recurrent major depressive disorder, in partial remission- doing well, cont rx.  Overview:  Paxil 40mg daily.    4. Panic attack- cont rx prn.  Overview:  Paxil 40mg daily,  Buspar 7.5mg TID prn.    5. Tortuous aorta- cont rx.  Overview:  On statin/ASA.    6. Multiple lung nodules on CT- order CT lung.  Overview:  Multiple right lung sub 6 mm pulmonary nodules.  Consider diagnostic CT chest for complete evaluation.        Electronically signed by:Robby Olivares MD  Date:                                            07/27/2023    Orders:  -     CT Chest Without Contrast; Future; Expected date: 03/19/2025    7. Age-related osteoporosis without current pathological fracture  Overview:  on fosamax x 2.5y then holiday starting 1/25 DUE TO GI SX.    8. Left shoulder pain, unspecified chronicity  -     Ambulatory Referral/Consult to Physical Therapy; Future; Expected date: 03/26/2025    9. Screen for colon cancer  -     Ambulatory referral/consult to Endo Procedure ; Future; Expected date: 03/20/2025     Accupuncture for right sciatic pain.  RLQ pain sometimes with RLE sciatic pain.    RTC 3mo.

## 2025-03-13 ENCOUNTER — PATIENT MESSAGE (OUTPATIENT)
Dept: RHEUMATOLOGY | Facility: CLINIC | Age: 70
End: 2025-03-13
Payer: MEDICARE

## 2025-03-19 ENCOUNTER — OFFICE VISIT (OUTPATIENT)
Dept: PRIMARY CARE CLINIC | Facility: CLINIC | Age: 70
End: 2025-03-19
Payer: MEDICARE

## 2025-03-19 VITALS
HEART RATE: 76 BPM | BODY MASS INDEX: 35.75 KG/M2 | OXYGEN SATURATION: 97 % | TEMPERATURE: 98 F | HEIGHT: 61 IN | WEIGHT: 189.38 LBS | DIASTOLIC BLOOD PRESSURE: 58 MMHG | SYSTOLIC BLOOD PRESSURE: 118 MMHG

## 2025-03-19 DIAGNOSIS — R93.1 ELEVATED CORONARY ARTERY CALCIUM SCORE: ICD-10-CM

## 2025-03-19 DIAGNOSIS — M25.512 LEFT SHOULDER PAIN, UNSPECIFIED CHRONICITY: ICD-10-CM

## 2025-03-19 DIAGNOSIS — M81.0 AGE-RELATED OSTEOPOROSIS WITHOUT CURRENT PATHOLOGICAL FRACTURE: ICD-10-CM

## 2025-03-19 DIAGNOSIS — M54.31 SCIATICA OF RIGHT SIDE: ICD-10-CM

## 2025-03-19 DIAGNOSIS — I77.1 TORTUOUS AORTA: ICD-10-CM

## 2025-03-19 DIAGNOSIS — R91.8 MULTIPLE LUNG NODULES ON CT: ICD-10-CM

## 2025-03-19 DIAGNOSIS — Z12.11 SCREEN FOR COLON CANCER: ICD-10-CM

## 2025-03-19 DIAGNOSIS — F41.0 PANIC ATTACK: ICD-10-CM

## 2025-03-19 DIAGNOSIS — F33.41 RECURRENT MAJOR DEPRESSIVE DISORDER, IN PARTIAL REMISSION: ICD-10-CM

## 2025-03-19 DIAGNOSIS — E78.2 MIXED HYPERLIPIDEMIA: Primary | Chronic | ICD-10-CM

## 2025-03-19 DIAGNOSIS — Z83.3 FH: DIABETES MELLITUS: ICD-10-CM

## 2025-03-19 PROCEDURE — 3008F BODY MASS INDEX DOCD: CPT | Mod: HCNC,CPTII,S$GLB, | Performed by: INTERNAL MEDICINE

## 2025-03-19 PROCEDURE — 1123F ACP DISCUSS/DSCN MKR DOCD: CPT | Mod: HCNC,CPTII,S$GLB, | Performed by: INTERNAL MEDICINE

## 2025-03-19 PROCEDURE — 3074F SYST BP LT 130 MM HG: CPT | Mod: HCNC,CPTII,S$GLB, | Performed by: INTERNAL MEDICINE

## 2025-03-19 PROCEDURE — 3078F DIAST BP <80 MM HG: CPT | Mod: HCNC,CPTII,S$GLB, | Performed by: INTERNAL MEDICINE

## 2025-03-19 PROCEDURE — 3288F FALL RISK ASSESSMENT DOCD: CPT | Mod: HCNC,CPTII,S$GLB, | Performed by: INTERNAL MEDICINE

## 2025-03-19 PROCEDURE — 1125F AMNT PAIN NOTED PAIN PRSNT: CPT | Mod: HCNC,CPTII,S$GLB, | Performed by: INTERNAL MEDICINE

## 2025-03-19 PROCEDURE — 99999 PR PBB SHADOW E&M-EST. PATIENT-LVL V: CPT | Mod: PBBFAC,HCNC,, | Performed by: INTERNAL MEDICINE

## 2025-03-19 PROCEDURE — 1101F PT FALLS ASSESS-DOCD LE1/YR: CPT | Mod: HCNC,CPTII,S$GLB, | Performed by: INTERNAL MEDICINE

## 2025-03-19 PROCEDURE — 99214 OFFICE O/P EST MOD 30 MIN: CPT | Mod: HCNC,S$GLB,, | Performed by: INTERNAL MEDICINE

## 2025-03-19 PROCEDURE — 85025 COMPLETE CBC W/AUTO DIFF WBC: CPT | Mod: HCNC | Performed by: INTERNAL MEDICINE

## 2025-03-19 PROCEDURE — 84443 ASSAY THYROID STIM HORMONE: CPT | Mod: HCNC | Performed by: INTERNAL MEDICINE

## 2025-03-19 PROCEDURE — 80053 COMPREHEN METABOLIC PANEL: CPT | Mod: HCNC | Performed by: INTERNAL MEDICINE

## 2025-03-19 PROCEDURE — 1159F MED LIST DOCD IN RCRD: CPT | Mod: HCNC,CPTII,S$GLB, | Performed by: INTERNAL MEDICINE

## 2025-03-19 PROCEDURE — 80061 LIPID PANEL: CPT | Mod: HCNC | Performed by: INTERNAL MEDICINE

## 2025-03-20 ENCOUNTER — PATIENT MESSAGE (OUTPATIENT)
Dept: PRIMARY CARE CLINIC | Facility: CLINIC | Age: 70
End: 2025-03-20
Payer: MEDICARE

## 2025-03-20 LAB
ALBUMIN SERPL BCP-MCNC: 3.9 G/DL (ref 3.5–5.2)
ALP SERPL-CCNC: 61 U/L (ref 40–150)
ALT SERPL W/O P-5'-P-CCNC: 23 U/L (ref 10–44)
ANION GAP SERPL CALC-SCNC: 9 MMOL/L (ref 8–16)
AST SERPL-CCNC: 27 U/L (ref 10–40)
BASOPHILS # BLD AUTO: 0.07 K/UL (ref 0–0.2)
BASOPHILS NFR BLD: 1.1 % (ref 0–1.9)
BILIRUB SERPL-MCNC: 0.5 MG/DL (ref 0.1–1)
BUN SERPL-MCNC: 17 MG/DL (ref 8–23)
CALCIUM SERPL-MCNC: 9.3 MG/DL (ref 8.7–10.5)
CHLORIDE SERPL-SCNC: 104 MMOL/L (ref 95–110)
CHOLEST SERPL-MCNC: 119 MG/DL (ref 120–199)
CHOLEST/HDLC SERPL: 2.4 {RATIO} (ref 2–5)
CO2 SERPL-SCNC: 26 MMOL/L (ref 23–29)
CREAT SERPL-MCNC: 0.9 MG/DL (ref 0.5–1.4)
DIFFERENTIAL METHOD BLD: NORMAL
EOSINOPHIL # BLD AUTO: 0.3 K/UL (ref 0–0.5)
EOSINOPHIL NFR BLD: 4.1 % (ref 0–8)
ERYTHROCYTE [DISTWIDTH] IN BLOOD BY AUTOMATED COUNT: 13.1 % (ref 11.5–14.5)
EST. GFR  (NO RACE VARIABLE): >60 ML/MIN/1.73 M^2
GLUCOSE SERPL-MCNC: 70 MG/DL (ref 70–110)
HCT VFR BLD AUTO: 39.3 % (ref 37–48.5)
HDLC SERPL-MCNC: 49 MG/DL (ref 40–75)
HDLC SERPL: 41.2 % (ref 20–50)
HGB BLD-MCNC: 12.7 G/DL (ref 12–16)
IMM GRANULOCYTES # BLD AUTO: 0.02 K/UL (ref 0–0.04)
IMM GRANULOCYTES NFR BLD AUTO: 0.3 % (ref 0–0.5)
LDLC SERPL CALC-MCNC: 54.8 MG/DL (ref 63–159)
LYMPHOCYTES # BLD AUTO: 2 K/UL (ref 1–4.8)
LYMPHOCYTES NFR BLD: 31.7 % (ref 18–48)
MCH RBC QN AUTO: 30.7 PG (ref 27–31)
MCHC RBC AUTO-ENTMCNC: 32.3 G/DL (ref 32–36)
MCV RBC AUTO: 95 FL (ref 82–98)
MONOCYTES # BLD AUTO: 0.6 K/UL (ref 0.3–1)
MONOCYTES NFR BLD: 9.5 % (ref 4–15)
NEUTROPHILS # BLD AUTO: 3.4 K/UL (ref 1.8–7.7)
NEUTROPHILS NFR BLD: 53.3 % (ref 38–73)
NONHDLC SERPL-MCNC: 70 MG/DL
NRBC BLD-RTO: 0 /100 WBC
PLATELET # BLD AUTO: 207 K/UL (ref 150–450)
PMV BLD AUTO: 9.4 FL (ref 9.2–12.9)
POTASSIUM SERPL-SCNC: 4.3 MMOL/L (ref 3.5–5.1)
PROT SERPL-MCNC: 7.2 G/DL (ref 6–8.4)
RBC # BLD AUTO: 4.14 M/UL (ref 4–5.4)
SODIUM SERPL-SCNC: 139 MMOL/L (ref 136–145)
TRIGL SERPL-MCNC: 76 MG/DL (ref 30–150)
TSH SERPL DL<=0.005 MIU/L-ACNC: 2.02 UIU/ML (ref 0.4–4)
WBC # BLD AUTO: 6.34 K/UL (ref 3.9–12.7)

## 2025-03-25 ENCOUNTER — HOSPITAL ENCOUNTER (OUTPATIENT)
Dept: PREADMISSION TESTING | Facility: HOSPITAL | Age: 70
Discharge: HOME | End: 2025-03-25
Attending: COLON & RECTAL SURGERY
Payer: MEDICARE

## 2025-03-25 DIAGNOSIS — Z12.11 SCREEN FOR COLON CANCER: Primary | ICD-10-CM

## 2025-03-25 RX ORDER — POLYETHYLENE GLYCOL 3350, SODIUM SULFATE ANHYDROUS, SODIUM BICARBONATE, SODIUM CHLORIDE, POTASSIUM CHLORIDE 236; 22.74; 6.74; 5.86; 2.97 G/4L; G/4L; G/4L; G/4L; G/4L
4 POWDER, FOR SOLUTION ORAL ONCE
Qty: 4000 ML | Refills: 0 | Status: SHIPPED | OUTPATIENT
Start: 2025-03-25 | End: 2025-03-25

## 2025-03-26 ENCOUNTER — HOSPITAL ENCOUNTER (OUTPATIENT)
Dept: RADIOLOGY | Facility: HOSPITAL | Age: 70
Discharge: HOME | End: 2025-03-26
Attending: INTERNAL MEDICINE
Payer: MEDICARE

## 2025-03-26 ENCOUNTER — PATIENT MESSAGE (OUTPATIENT)
Dept: PRIMARY CARE CLINIC | Facility: CLINIC | Age: 70
End: 2025-03-26
Payer: MEDICARE

## 2025-03-26 DIAGNOSIS — R91.8 MULTIPLE LUNG NODULES ON CT: ICD-10-CM

## 2025-03-26 PROCEDURE — 71250 CT THORAX DX C-: CPT | Mod: TC,HCNC

## 2025-03-26 PROCEDURE — 71250 CT THORAX DX C-: CPT | Mod: 26,HCNC,, | Performed by: RADIOLOGY

## 2025-04-02 ENCOUNTER — ANESTHESIA EVENT (OUTPATIENT)
Dept: ENDOSCOPY | Facility: HOSPITAL | Age: 70
End: 2025-04-02
Payer: MEDICARE

## 2025-04-02 ENCOUNTER — ANESTHESIA (OUTPATIENT)
Dept: ENDOSCOPY | Facility: HOSPITAL | Age: 70
End: 2025-04-02
Payer: MEDICARE

## 2025-04-02 ENCOUNTER — HOSPITAL ENCOUNTER (OUTPATIENT)
Dept: ENDOSCOPY | Facility: HOSPITAL | Age: 70
Discharge: HOME | End: 2025-04-02
Attending: INTERNAL MEDICINE
Payer: MEDICARE

## 2025-04-02 VITALS
TEMPERATURE: 98 F | HEART RATE: 74 BPM | OXYGEN SATURATION: 96 % | SYSTOLIC BLOOD PRESSURE: 148 MMHG | RESPIRATION RATE: 18 BRPM | DIASTOLIC BLOOD PRESSURE: 72 MMHG

## 2025-04-02 DIAGNOSIS — Z12.11 SCREEN FOR COLON CANCER: ICD-10-CM

## 2025-04-02 PROCEDURE — 37000008 HC ANESTHESIA 1ST 15 MINUTES: Mod: HCNC

## 2025-04-02 PROCEDURE — 27201012 HC FORCEPS, HOT/COLD, DISP: Mod: HCNC | Performed by: STUDENT IN AN ORGANIZED HEALTH CARE EDUCATION/TRAINING PROGRAM

## 2025-04-02 PROCEDURE — 63600175 PHARM REV CODE 636 W HCPCS: Mod: HCNC | Performed by: STUDENT IN AN ORGANIZED HEALTH CARE EDUCATION/TRAINING PROGRAM

## 2025-04-02 PROCEDURE — 27200997: Mod: HCNC | Performed by: STUDENT IN AN ORGANIZED HEALTH CARE EDUCATION/TRAINING PROGRAM

## 2025-04-02 PROCEDURE — 37000009 HC ANESTHESIA EA ADD 15 MINS: Mod: HCNC

## 2025-04-02 RX ORDER — PROPOFOL 10 MG/ML
VIAL (ML) INTRAVENOUS
Status: DISCONTINUED | OUTPATIENT
Start: 2025-04-02 | End: 2025-04-02

## 2025-04-02 RX ORDER — LIDOCAINE HYDROCHLORIDE 10 MG/ML
INJECTION, SOLUTION EPIDURAL; INFILTRATION; INTRACAUDAL; PERINEURAL
Status: DISCONTINUED | OUTPATIENT
Start: 2025-04-02 | End: 2025-04-02

## 2025-04-02 RX ADMIN — PROPOFOL 100 MG: 10 INJECTION, EMULSION INTRAVENOUS at 09:04

## 2025-04-02 RX ADMIN — PROPOFOL 50 MG: 10 INJECTION, EMULSION INTRAVENOUS at 09:04

## 2025-04-02 RX ADMIN — LIDOCAINE HYDROCHLORIDE 50 MG: 10 SOLUTION INTRAVENOUS at 09:04

## 2025-04-02 NOTE — ANESTHESIA PREPROCEDURE EVALUATION
04/02/2025  Mouna Liodaya Chandra is a 70 y.o., female.    Past Medical History:   Diagnosis Date    Anxiety 12/21/2016    Anxiety and depression     Familial juvenile macular degeneration syndrome - Both Eyes 11/12/2013    GERD (gastroesophageal reflux disease)     Hyperlipidemia LDL goal < 100     Lumbar disc disease     Dr. Chandra    Palpitation 12/21/2016    Panic attack 12/21/2016    Vitamin D deficiency      Past Surgical History:   Procedure Laterality Date    COLONOSCOPY      EXCISION OF GANGLION CYST OF HAND Right 3/16/2023    Procedure: EXCISION, GANGLION CYST, HAND;  Surgeon: Kurt Moreno MD;  Location: [facility] OR;  Service: Orthopedics;  Laterality: Right;  excision ganglion cyst right index finger dorsal metacarpophalangeal joint.    HYSTERECTOMY      INJECTION OF ANESTHETIC AGENT AROUND MEDIAL BRANCH NERVES INNERVATING CERVICAL FACET JOINT Bilateral 04/14/2021    Procedure: Bilateral C3-5 MBB with RN IV sedation;  Surgeon: Steve Gutierrez MD;  Location: [facility] PAIN MGT;  Service: Pain Management;  Laterality: Bilateral;    SELECTIVE INJECTION OF ANESTHETIC AGENT AROUND LUMBAR SPINAL NERVE ROOT BY TRANSFORAMINAL APPROACH Right 02/25/2021    Procedure: BLOCK, SPINAL NERVE ROOT, LUMBAR, SELECTIVE, TRANSFORAMINAL APPROACH Right L4-5, l5-S1 RN IV sedation;  Surgeon: Steve Gutierrez MD;  Location: [facility] PAIN MGT;  Service: Pain Management;  Laterality: Right;    TOTAL ABDOMINAL HYSTERECTOMY W/ BILATERAL SALPINGOOPHORECTOMY  2002       Pre-op Assessment    I have reviewed the Patient Summary Reports.     I have reviewed the Nursing Notes. I have reviewed the NPO Status.   I have reviewed the Medications.     Review of Systems  Anesthesia Hx:  No problems with previous Anesthesia   History of prior surgery of interest to airway management or planning:  Previous anesthesia: General        Denies  Family Hx of Anesthesia complications.    Denies Personal Hx of Anesthesia complications.                    Social:  Non-Smoker       Hematology/Oncology:  Hematology Normal                                     EENT/Dental:   Macular degeneration.          Cardiovascular:        CAD           hyperlipidemia CORRIGAN  ECG has been reviewed. W/U 2-3 yrs ago for palpitations, NEG stress echo with NL EF and benign Holter.        Shortness of Breath Dyspnea On Extertion   Coronary Artery Disease:                                  Pulmonary:      Shortness of breath                  Renal/:  Renal/ Normal                 Hepatic/GI:     GERD         Gerd          Neurological:    Neuromuscular Disease,                                 Neuromuscular Disease   Psych:  Psychiatric History anxiety depression                Physical Exam  General: Oriented, Well nourished, Cooperative and Alert    Airway:  Mallampati: II   Mouth Opening: Normal  TM Distance: Normal  Tongue: Normal  Neck ROM: Normal ROM    Dental:  Intact    Chest/Lungs:  Clear to auscultation, Normal Respiratory Rate    Heart:  Rate: Normal  Rhythm: Regular Rhythm        Anesthesia Plan  Type of Anesthesia, risks & benefits discussed:    Anesthesia Type: MAC  Intra-op Monitoring Plan: Standard ASA Monitors  Post Op Pain Control Plan: multimodal analgesia and IV/PO Opioids PRN  Induction:  IV  Informed Consent: Informed consent signed with the Patient and all parties understand the risks and agree with anesthesia plan.  All questions answered.   ASA Score: 2  Day of Surgery Review of History & Physical: H&P Update referred to the surgeon/provider.    Ready For Surgery From Anesthesia Perspective.     .

## 2025-04-02 NOTE — PLAN OF CARE
Dr Chavez at bedside to update patient and sister on results and recs. AVS sent to [facility]. Discussed post procedure/sedation precautions. Verbalized understanding.

## 2025-04-02 NOTE — ANESTHESIA POSTPROCEDURE EVALUATION
Anesthesia Post Evaluation    Patient: Mouna Chandra    Procedure(s) Performed: * No procedures listed *    Final Anesthesia Type: MAC      Patient location during evaluation: PACU  Patient participation: Yes- Able to Participate  Level of consciousness: awake and alert and oriented  Post-procedure vital signs: reviewed and stable  Pain management: adequate  Airway patency: patent  TANIKA mitigation strategies: Multimodal analgesia and Extubation and recovery carried out in lateral, semiupright, or other nonsupine position  PONV status at discharge: No PONV  Anesthetic complications: no      Cardiovascular status: blood pressure returned to baseline and hemodynamically stable  Respiratory status: unassisted and spontaneous ventilation  Hydration status: euvolemic  Follow-up not needed.  Comments: Report given to PACU RN. Hand Off Tool Used. RN given opportunity to ask questions or clarify concerns. No Concerns verbalized. Pt. Left in stable condition. SV. Vital Signs Return to Near Baseline. No s/s of distress noted.                   No case tracking events are documented in the log.      Pain/Felisha Score: No data recorded

## 2025-04-02 NOTE — H&P
[facility] ([facility])  Colon and Rectal Surgery  History & Physical    Patient Name: Mouna Chandra  MRN: [id number]  Admission Date: 4/2/2025  Attending Physician: Tram Chavez MD  Primary Care Provider: Ofe Velze MD    Patient information was obtained from patient and medical records.    Subjective:     Chief Complaint/Reason for Admission: Here for Colonoscopy    History of Present Illness:  Patient is a 70 y.o. female presents for colonoscopy. Prior scope 10 years ago with polyps    Denies changes in bowel habits such as bleeding, melena, painful bowel movements, change in caliber of stool, diarrhea or constipation.    Denies FH of colorectal cancer, polyps or IBD       Current Outpatient Medications on File Prior to Encounter   Medication Sig    aspirin (ECOTRIN) 81 MG EC tablet Take 1 tablet (81 mg total) by mouth once daily.    atorvastatin (LIPITOR) 40 MG tablet TAKE 1 TABLET(40 MG) BY MOUTH EVERY DAY    busPIRone (BUSPAR) 7.5 MG tablet Take 1 tablet (7.5 mg total) by mouth 3 (three) times daily as needed (anxiety).    cholecalciferol, vitamin D3, (VITAMIN D3) 50 mcg (2,000 unit) Cap capsule Take 1 capsule (2,000 Units total) by mouth once daily.    ezetimibe (ZETIA) 10 mg tablet Take 1 tablet (10 mg total) by mouth once daily. For high cholesterol    gabapentin (NEURONTIN) 100 MG capsule TAKE 1 TO 3 CAPSULES(100  MG) BY MOUTH EVERY NIGHT AS NEEDED FOR NERVE PAIN    ibuprofen (ADVIL,MOTRIN) 800 MG tablet Take 800 mg by mouth every 6 (six) hours as needed for Pain.    Lactobacillus rhamnosus GG (CULTURELLE) 10 billion cell capsule Take 1 capsule by mouth once daily.    magnesium 250 mg Tab Take 250 mg by mouth daily as needed (indigestion).    paroxetine (PAXIL) 40 MG tablet TAKE 1 TABLET(40 MG) BY MOUTH EVERY MORNING    traZODone (DESYREL) 50 MG tablet TAKE 1 AND 1/2 TABLETS(75 MG) BY MOUTH EVERY EVENING as needed for sleep    vit A/vit C/vit E/zinc/copper (OCUVITE  PRESERVISION ORAL) Take by mouth once daily.    vitamin E 400 UNIT capsule Take 400 Units by mouth once daily.     Current Facility-Administered Medications on File Prior to Encounter   Medication    cefazolin (ANCEF) 2 gram in dextrose 5% 50 mL IVPB (premix)    chlorhexidine 0.12 % solution 10 mL       Review of patient's allergies indicates:   Allergen Reactions    Adhesive Rash    Codeine Nausea And Vomiting    Iodine     Iodine containing multivitamin Nausea Only    Lanolin Hives    Latex, natural rubber Hives    Neosporin [benzalkonium chloride] Hives    Shellfish containing products Nausea And Vomiting    Neosporin (neomycin-polymyx) Hives, Itching and Rash       Past Medical History:   Diagnosis Date    Anxiety 12/21/2016    Anxiety and depression     Familial juvenile macular degeneration syndrome - Both Eyes 11/12/2013    GERD (gastroesophageal reflux disease)     Hyperlipidemia LDL goal < 100     Lumbar disc disease     Dr. Chandra    Palpitation 12/21/2016    Panic attack 12/21/2016    Vitamin D deficiency      Past Surgical History:   Procedure Laterality Date    COLONOSCOPY      EXCISION OF GANGLION CYST OF HAND Right 3/16/2023    Procedure: EXCISION, GANGLION CYST, HAND;  Surgeon: Kurt Moreno MD;  Location: [facility] OR;  Service: Orthopedics;  Laterality: Right;  excision ganglion cyst right index finger dorsal metacarpophalangeal joint.    HYSTERECTOMY      INJECTION OF ANESTHETIC AGENT AROUND MEDIAL BRANCH NERVES INNERVATING CERVICAL FACET JOINT Bilateral 04/14/2021    Procedure: Bilateral C3-5 MBB with RN IV sedation;  Surgeon: Steve Gutierrez MD;  Location: [facility] PAIN MGT;  Service: Pain Management;  Laterality: Bilateral;    SELECTIVE INJECTION OF ANESTHETIC AGENT AROUND LUMBAR SPINAL NERVE ROOT BY TRANSFORAMINAL APPROACH Right 02/25/2021    Procedure: BLOCK, SPINAL NERVE ROOT, LUMBAR, SELECTIVE, TRANSFORAMINAL APPROACH Right L4-5, l5-S1 RN IV sedation;  Surgeon: Steve Gutierrez MD;  Location:  [facility] PAIN MGT;  Service: Pain Management;  Laterality: Right;    TOTAL ABDOMINAL HYSTERECTOMY W/ BILATERAL SALPINGOOPHORECTOMY  2002     Family History       Problem Relation (Age of Onset)    Amblyopia Sister    Blindness Sister    Cataracts Paternal Uncle    Chronic back pain Sister    Coronary artery disease Brother    Crohn's disease Brother    Diabetes Mother    Heart attack Father, Paternal Grandmother    Heart disease Brother (45)    Heart failure Father, Paternal Grandfather, Paternal Uncle    Hypertension Mother    Macular degeneration Sister    Stroke Paternal Uncle          Tobacco Use    Smoking status: Never     Passive exposure: Yes    Smokeless tobacco: Never   Substance and Sexual Activity    Alcohol use: Yes     Alcohol/week: 2.0 standard drinks of alcohol     Types: 2 Cans of beer per week     Comment: beer 2 nightly    Drug use: Not Currently     Types: Marijuana     Comment: few x week    Sexual activity: Yes     Partners: Male       Objective:     Vital Signs (Most Recent):    Vital Signs (24h Range):           There is no height or weight on file to calculate BMI.    Physical Exam  Constitutional:       Appearance: She is well-developed.   HENT:      Head: Normocephalic and atraumatic.   Eyes:      Conjunctiva/sclera: Conjunctivae normal.      Pupils: Pupils are equal, round, and reactive to light.   Neck:      Thyroid: No thyromegaly.   Cardiovascular:      Rate and Rhythm: Normal rate and regular rhythm.   Pulmonary:      Effort: Pulmonary effort is normal. No respiratory distress.   Abdominal:      General: There is no distension.      Palpations: Abdomen is soft. There is no mass.      Tenderness: There is no abdominal tenderness.   Musculoskeletal:         General: No tenderness. Normal range of motion.      Cervical back: Normal range of motion.   Skin:     General: Skin is warm and dry.      Capillary Refill: Capillary refill takes less than 2 seconds.   Neurological:      General: No  focal deficit present.      Mental Status: She is alert and oriented to person, place, and time.           Assessment/Plan:     Patient is a 70 y.o. female who presents for colonoscopy     - Ok to proceed to endoscopy suite for colonoscopy  - Consent obtained. All risks, benefits and alternatives fully explained to patient, including but not limited to bleeding, infection, perforation, and missed polyps. All questions appropriately answered to patient's satisfaction. Consent signed and placed on chart.    There are no hospital problems to display for this patient.    VTE Risk Mitigation (From admission, onward)      None            Tram Chavez MD  Colon and Rectal Surgery  [facility] ([facility]

## 2025-04-02 NOTE — TRANSFER OF CARE
Anesthesia Transfer of Care Note    Patient: Mouna Chandra    Procedure(s) Performed: * No procedures listed *    Patient location: PACU    Anesthesia Type: MAC    Transport from OR: Transported from OR on room air with adequate spontaneous ventilation    Post pain: adequate analgesia    Post assessment: no apparent anesthetic complications    Post vital signs: stable    Level of consciousness: responds to stimulation and awake    Nausea/Vomiting: no nausea/vomiting    Complications: none    Transfer of care protocol was followedComments: Report given to PACU RN at bedside. Hand off tool used. RN given opportunity to ask questions or clarify concerns. No Concerns verbalized. RN was asked if ready to assume care of patient. RN verbally confirmed. Pt. left in stable condition. SV. Vital Signs Return to Near Baseline. No s/s of distress noted.       Last vitals: Visit Vitals  Breastfeeding No

## 2025-04-03 ENCOUNTER — OFFICE VISIT (OUTPATIENT)
Dept: FAMILY MEDICINE | Facility: CLINIC | Age: 70
End: 2025-04-03
Payer: MEDICARE

## 2025-04-03 VITALS
BODY MASS INDEX: 34.72 KG/M2 | OXYGEN SATURATION: 97 % | WEIGHT: 183.88 LBS | SYSTOLIC BLOOD PRESSURE: 112 MMHG | HEIGHT: 61 IN | DIASTOLIC BLOOD PRESSURE: 70 MMHG | HEART RATE: 66 BPM | RESPIRATION RATE: 18 BRPM

## 2025-04-03 DIAGNOSIS — Z00.00 ENCOUNTER FOR MEDICARE ANNUAL WELLNESS EXAM: Primary | ICD-10-CM

## 2025-04-03 DIAGNOSIS — M81.0 AGE-RELATED OSTEOPOROSIS WITHOUT CURRENT PATHOLOGICAL FRACTURE: ICD-10-CM

## 2025-04-03 DIAGNOSIS — M54.16 LUMBAR RADICULOPATHY: ICD-10-CM

## 2025-04-03 DIAGNOSIS — H35.30 MACULAR DEGENERATION, UNSPECIFIED LATERALITY, UNSPECIFIED TYPE: ICD-10-CM

## 2025-04-03 DIAGNOSIS — F33.41 RECURRENT MAJOR DEPRESSIVE DISORDER, IN PARTIAL REMISSION: ICD-10-CM

## 2025-04-03 DIAGNOSIS — M54.31 SCIATICA OF RIGHT SIDE: ICD-10-CM

## 2025-04-03 DIAGNOSIS — H91.93 DECREASED HEARING OF BOTH EARS: ICD-10-CM

## 2025-04-03 DIAGNOSIS — E78.2 MIXED HYPERLIPIDEMIA: Chronic | ICD-10-CM

## 2025-04-03 DIAGNOSIS — K44.0 DIAPHRAGMATIC HERNIA WITH OBSTRUCTION, WITHOUT GANGRENE: ICD-10-CM

## 2025-04-03 DIAGNOSIS — E55.9 VITAMIN D DEFICIENCY: ICD-10-CM

## 2025-04-03 PROBLEM — Z86.16 HISTORY OF 2019 NOVEL CORONAVIRUS DISEASE (COVID-19): Status: RESOLVED | Noted: 2020-09-21 | Resolved: 2025-04-03

## 2025-04-03 PROCEDURE — 99999 PR PBB SHADOW E&M-EST. PATIENT-LVL V: CPT | Mod: PBBFAC,HCNC,, | Performed by: NURSE PRACTITIONER

## 2025-04-03 NOTE — PATIENT INSTRUCTIONS
Counseling and Referral of Other Preventative  (Italic type indicates deductible and co-insurance are waived)    Patient Name: Mouna Chandra  Today's Date: 4/3/2025    Health Maintenance       Date Due Completion Date    COVID-19 Vaccine (3 - 2024-25 season) 09/01/2024 8/20/2021    Mammogram 07/17/2025 7/17/2024    Override on 8/12/2013: Done    Lipid Panel 03/19/2026 3/19/2025    Hemoglobin A1c (Diabetic Prevention Screening) 07/17/2026 7/17/2023    DEXA Scan 07/17/2026 7/17/2024    Override on 8/12/2013: Done (NORMAL with Dr. Finch)    TETANUS VACCINE 11/21/2026 11/21/2016    Colorectal Cancer Screening 04/02/2030 4/2/2025    Override on 8/3/2006: Done (INternal Hemorrhoids o/w Normal with Dr. Munoz)        Orders Placed This Encounter   Procedures    Ambulatory referral/consult to Audiology     The following information is provided to all patients.  This information is to help you find resources for any of the problems found today that may be affecting your health:                  Living healthy guide: www.[facility].[place].gov      Understanding Diabetes: www.diabetes.org      Eating healthy: www.cdc.gov/healthyweight      CDC home safety checklist: www.cdc.gov/steadi/patient.html      Agency on Aging: www.goea.[place].[facility]      Alcoholics anonymous (AA): www.aa.org      Physical Activity: www.diaz.nih.gov/ix8wrmv      Tobacco use: www.quitwithusla.org

## 2025-04-03 NOTE — PROGRESS NOTES
"  Mouna Chandra presented for a  Medicare AWV and comprehensive Health Risk Assessment today. The following components were reviewed and updated:    Medical history  Family History  Social history  Allergies and Current Medications  Health Risk Assessment  Health Maintenance  Care Team         ** See Completed Assessments for Annual Wellness Visit within the encounter summary.**         The following assessments were completed:  Living Situation  CAGE  Depression Screening  Timed Get Up and Go  Whisper Test  Cognitive Function Screening    Nutrition Screening  ADL Screening  PAQ Screening  Has urine leakage ever interrupted your daily activites or sleep? No  Do you think you could use some help to better manage urine leakage?No       Opioid documentation:      Patient does not have a current opioid prescription.        Vitals:    04/03/25 1300   BP: 112/70   Pulse: 66   Resp: 18   SpO2: 97%   Weight: 83.4 kg (183 lb 13.8 oz)   Height: 5' 1" (1.549 m)     Body mass index is 34.74 kg/m².  Physical Exam  Constitutional:       Appearance: Normal appearance. She is well-developed.   HENT:      Head: Normocephalic and atraumatic.      Right Ear: External ear normal.      Left Ear: External ear normal.      Nose: Nose normal.      Mouth/Throat:      Mouth: Mucous membranes are moist.   Eyes:      General: No scleral icterus.        Right eye: No discharge.         Left eye: No discharge.      Conjunctiva/sclera: Conjunctivae normal.   Neck:      Thyroid: No thyromegaly.      Trachea: No tracheal deviation.   Cardiovascular:      Rate and Rhythm: Normal rate and regular rhythm.      Heart sounds: Normal heart sounds. No murmur heard.     No friction rub. No gallop.   Pulmonary:      Effort: Pulmonary effort is normal. No respiratory distress.      Breath sounds: Normal breath sounds. No wheezing or rales.   Chest:      Chest wall: No tenderness.   Abdominal:      General: Bowel sounds are normal. There is no distension.    "   Palpations: Abdomen is soft. There is no mass.      Tenderness: There is no abdominal tenderness. There is no guarding or rebound.   Musculoskeletal:         General: No tenderness. Normal range of motion.      Cervical back: Normal range of motion and neck supple. No edema. Normal range of motion.   Lymphadenopathy:      Head:      Right side of head: No tonsillar adenopathy.      Left side of head: No tonsillar adenopathy.      Cervical: No cervical adenopathy.      Upper Body:      Right upper body: No supraclavicular adenopathy.      Left upper body: No supraclavicular adenopathy.   Skin:     General: Skin is warm and dry.      Findings: No lesion or rash.   Neurological:      Mental Status: She is alert and oriented to person, place, and time.      Deep Tendon Reflexes: Reflexes are normal and symmetric.   Psychiatric:         Mood and Affect: Mood normal.         Behavior: Behavior normal.               Diagnoses and health risks identified today and associated recommendations/orders:    1. Encounter for Medicare annual wellness exam  Screenings performed, as noted above.  Personal preventative testing needs reviewed.    - Referral to Enhanced Annual Wellness Visit (eAWV) W+1    2. Decreased hearing of both ears  Assessed, unstable, will schedule appt  - Ambulatory referral/consult to Audiology; Future    3. Lumbar radiculopathy  Monitored, stable, exercises routinely at [employer], stay active    4. Recurrent major depressive disorder, in partial remission  Treated with Paxil, stable, cont tx    5. Macular degeneration, unspecified laterality, unspecified type  Treated with vitamins, stable, cont tx    6. Mixed hyperlipidemia  Treated with atorvastatin, zetia, stable, cont tx    7. Age-related osteoporosis without current pathological fracture  Treated with fosamax, caused GI symptoms, on holiday, follow up with pcp    8. Vitamin D deficiency  Treated with supplements, stable, cont tx    9.  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 Sciatica of right side  Treated with exercise, stable, follow up with pcp      Provided Mouna with a 5-10 year written screening schedule and personal prevention plan. Recommendations were developed using the USPSTF age appropriate recommendations. Education, counseling, and referrals were provided as needed. After Visit Summary printed and given to patient which includes a list of additional screenings\tests needed.    No follow-ups on file.    Herson Coughlin NP  I offered to discuss advanced care planning, including how to pick a person who would make decisions for you if you were unable to make them for yourself, called a health care power of , and what kind of decisions you might make such as use of life sustaining treatments such as ventilators and tube feeding when faced with a life limiting illness recorded on a living will that they will need to know. (How you want to be cared for as you near the end of your natural life)     X Patient is interested in learning more about how to make advanced directives.  I provided them paperwork and offered to discuss this with them.

## 2025-04-08 ENCOUNTER — CLINICAL SUPPORT (OUTPATIENT)
Dept: REHABILITATION | Facility: HOSPITAL | Age: 70
End: 2025-04-08
Payer: MEDICARE

## 2025-04-08 DIAGNOSIS — M25.512 LEFT SHOULDER PAIN, UNSPECIFIED CHRONICITY: ICD-10-CM

## 2025-04-08 DIAGNOSIS — R29.898 WEAKNESS OF LEFT SHOULDER: Primary | ICD-10-CM

## 2025-04-08 PROCEDURE — 97161 PT EVAL LOW COMPLEX 20 MIN: CPT | Mod: HCNC,PN

## 2025-04-08 PROCEDURE — 97530 THERAPEUTIC ACTIVITIES: CPT | Mod: HCNC,PN

## 2025-04-08 NOTE — PROGRESS NOTES
Outpatient Rehab    Physical Therapy Evaluation    Patient Name: Mouna Chandra  MRN: [id number]  YOB: 1955  Encounter Date: 4/8/2025    Therapy Diagnosis:   Encounter Diagnoses   Name Primary?    Left shoulder pain, unspecified chronicity     Weakness of left shoulder Yes     Physician: Ofe Velez MD    Physician Orders: Eval and Treat  Medical Diagnosis: Left shoulder pain, unspecified chronicity    Visit # / Visits Authorized:  1 / 1  Insurance Authorization Period: 3/19/2025 to 3/19/2026  Date of Evaluation: 4/8/2025  Plan of Care Certification: 4/8/2025 to 5/21/2025     Time In: 1440   Time Out: 1525  Total Time: 45   Total Billable Time:  45 minutes 1:1 with PT    Intake Outcome Measure for FOTO Survey    Therapist reviewed FOTO scores for Mouna Chandra on 4/8/2025.   FOTO report - see Media section or FOTO account episode details.     Intake Score: 58%         Subjective   History of Present Illness  Mouna is a 70 y.o. female who reports to physical therapy with a chief concern of L shoulder pain.            History of Present Condition/Illness: Patient presents c/o left shoulder pain that started in January 2025 without any specific mechanism of injury that can be recalled. Pt states pain in shoulder does radiate into the upper arm, gradually worsening in time since onset. Pt notes pain is more activity based, but does exist at some level constantly. Pt denies any numbness/ tingling.     Pain     Patient reports a current pain level of 6/10.  Pain at worst is reported as 10/10.   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    Mouna Chandra  has a past surgical history that includes Total abdominal hysterectomy w/ bilateral salpingoophorectomy (2002); Hysterectomy; Selective injection of anesthetic agent around lumbar spinal nerve root by transforaminal approach (Right, 02/25/2021); Injection of anesthetic agent around medial branch nerves innervating cervical facet joint (Bilateral, 04/14/2021); Colonoscopy; and Excision of ganglion cyst of hand (Right, 3/16/2023).    Mouna has a current medication list which includes the following prescription(s): aspirin, atorvastatin, buspirone, cholecalciferol (vitamin d3), ezetimibe, gabapentin, ibuprofen, lactobacillus rhamnosus gg, magnesium, paroxetine, trazodone, vit a/vit c/vit e/zinc/copper, and vitamin e.    Review of patient's allergies indicates:   Allergen Reactions    Adhesive Rash    Codeine Nausea And Vomiting    Iodine     Iodine containing multivitamin Nausea Only    Lanolin Hives    Latex, natural rubber Hives    Neosporin [benzalkonium chloride] Hives    Shellfish containing products Nausea And Vomiting    Neosporin (neomycin-polymyx) Hives, Itching and Rash        Objective          RANGE OF MOTION:    Shoulder AROM  (Degrees)  Right Left Pain/Dysfunction with Movement Goal  (L)   Shoulder Flexion (180) Full 80 Pain increase L  170   Shoulder Abduction (180) Full 50 Pain increase L  165   Shoulder ER (90) T3/4 C7/T1 Pain increase L  T3/4   Shoulder IR (70) L4/5  L4/5 Discomfort in L shoulder  ---       STRENGTH:    U/E MMT Right Left Pain/Dysfunction with Movement Goal   Shoulder Flexion 4+/5 2/5 Pain limited L  4+/5 L   Shoulder Abduction 4+/5 2/5 Pain  limited L 4+/5 L   Shoulder IR 4+/5 4+/5 --- ---   Shoulder ER 4+/5 4/5 Pain increase L  4+/5 L       MUSCLE LENGTH:     Muscle Tested  Left    Upper Trapezius  decreased   Levator Scapulae  decreased   Pectoralis Minor  decreased   Pectoralis Major decreased       Palpation: TTP at L upper trapezius and L RTC tendonous junction      Posture:  Pt presents with postural abnormalities which include: forward head and rounded shoulders       Treatment: (15 minutes)   Therapeutic Activity  TA 1: HEP and POC education         Assessment & Plan   Assessment  Mouna presents with a condition of Low complexity.   Presentation of Symptoms: Stable  Will Comorbidities Impact Care: Yes  See patient past medical history as listed above in subjective section     Functional Limitations: Activity tolerance, Carrying objects, Driving, Reaching, Pain with ADLs/IADLs, Pain when reaching  Impairments: Abnormal gait, Abnormal muscle firing, Abnormal or restricted range of motion, Impaired physical strength, Pain with functional activity, Activity intolerance    Patient Goal for Therapy (PT): to decrease pain and improve function  Prognosis: Good  Assessment Details: Patient is a 70 year old female presenting to outpatient physical therapy with diagnosed left shoulder pain. Pt presents with deficits in strength, range of motion, flexibility, posture and TTP. Pt is being limited with reaching and lifting movements for activities of daily living around home and for self-care.    Plan  From a physical therapy perspective, the patient would benefit from: Skilled Rehab Services    Planned therapy interventions include: Therapeutic exercise, Therapeutic activities, Neuromuscular re-education, Manual therapy, ADLs/IADLs, and Other (Comment). Dry Needling   Planned modalities to include: Cryotherapy (cold pack), Electrical stimulation - attended, Electrical stimulation - passive/unattended, Thermotherapy (hot pack), Mechanical traction, and  Ultrasound.        Visit Frequency: 1 times Per Week for 6 Weeks.          Discussion participants: Agreed Upon Plan of Care         Patient's spiritual, cultural, and educational needs considered and patient agreeable to plan of care and goals.     Education             No show policy, HEP, plan of care, PT role and benefits        Goals:   Active       Functional outcome       Patient will show a significant change in FOTO score to 75 or better to demonstrate subjective improvement in overall function.        Start:  04/09/25    Expected End:  05/21/25            Patient will demonstrate independence in home program for support of progression       Start:  04/09/25    Expected End:  04/30/25               Pain       Patient will report pain of 5/10 at worst demonstrating a reduction of overall pain       Start:  04/09/25    Expected End:  04/30/25               Range of Motion       Patient will achieve shoulder AROM to stated goals in objective section of evaluation.       Start:  04/09/25    Expected End:  05/21/25               Strength       Patient will demonstrate strength improvements to stated goals in objective section of evaluation.       Start:  04/09/25    Expected End:  05/21/25                Radha Valle, PT

## 2025-04-09 PROBLEM — R29.898 WEAKNESS OF LEFT SHOULDER: Status: ACTIVE | Noted: 2025-04-09

## 2025-04-14 ENCOUNTER — RESULTS FOLLOW-UP (OUTPATIENT)
Dept: SURGERY | Facility: HOSPITAL | Age: 70
End: 2025-04-14

## 2025-04-15 DIAGNOSIS — M54.30 SCIATIC LEG PAIN: ICD-10-CM

## 2025-04-15 RX ORDER — GABAPENTIN 100 MG/1
100 CAPSULE ORAL 3 TIMES DAILY PRN
Qty: 90 CAPSULE | Refills: 3 | Status: SHIPPED | OUTPATIENT
Start: 2025-04-15 | End: 2025-08-13

## 2025-04-16 ENCOUNTER — CLINICAL SUPPORT (OUTPATIENT)
Dept: REHABILITATION | Facility: HOSPITAL | Age: 70
End: 2025-04-16
Payer: MEDICARE

## 2025-04-16 DIAGNOSIS — R29.898 WEAKNESS OF LEFT SHOULDER: Primary | ICD-10-CM

## 2025-04-16 PROCEDURE — 97112 NEUROMUSCULAR REEDUCATION: CPT | Mod: HCNC,PN

## 2025-04-16 PROCEDURE — 97110 THERAPEUTIC EXERCISES: CPT | Mod: HCNC,PN

## 2025-04-16 NOTE — PROGRESS NOTES
"  Outpatient Rehab    Physical Therapy Visit    Patient Name: Mouna Chandra  MRN: [id number]  YOB: 1955  Encounter Date: 4/16/2025    Therapy Diagnosis:   Encounter Diagnosis   Name Primary?    Weakness of left shoulder Yes     Physician: Ofe Velez MD    Physician Orders: Eval and Treat  Medical Diagnosis: Left shoulder pain, unspecified chronicity    Visit # / Visits Authorized:  1 / 20  Insurance Authorization Period: 4/5/2025 to 3/20/2027  Date of Evaluation: 4/8/2025  Plan of Care Certification: 4/8/2025 to 5/21/2025      Time In: 1310   Time Out: 1400  Total Time: 50   Total Billable Time:  25 minutes 1:1 with PT     FOTO: 1/3         Subjective   Patient still remains moderate to high. However, patient is already starting to notice some functional improvement since starting HEP..  Pain reported as 6/10.      Objective    To be reassessed at next progress note/ plan of care        Treatment:      CPT Intervention  JointFocus Duration / Intensity  4/16/2025   TE UBE   2'/2'   TE L shoulder AAROM   wand flex, abd, ER in supine x 15 each    NMR  ABCs  supine x 2    TE  Pulleys   flex, abd 2 minutes each    NMR  Rows   Green cord 2 x 12    NMR  Shoulder isometrics   IR, ER, Flex, abd x 15 10"    TE  Wall slides   scaption x 10   NMR  B shoulder extension   Green cord 2 x 10    NMR Wall push ups  X 10   PLAN              CPT Codes available for Billing:   (-) minutes of Manual therapy (MT) to improve pain and ROM.  (15) minutes of Therapeutic Exercise (TE) to develop strength, endurance, range of motion, and flexibility.  (10) minutes of Neuromuscular Re-Education (NMR)  to improve: Balance, Coordination, Kinesthetic, Sense, Proprioception, and Posture.  (-) minutes of Therapeutic Activities (TA) to improve functional performance.  (-) minutes of Gait Training (GT) to improve functional mobility and safety  Unattended Electrical Stimulation (ES) for muscle performance or pain " modulation.  Vasopneumatic Device Therapy () for management of swelling/edema. (29742)  BFR: Blood flow restriction applied during exercise  NP or (-): Not Performed    Assessment & Plan   Assessment: Patient presents to first follow up session today with continued high pain levels, but improved function. Pt tolerates treatment with focus on AAROM for mobility develpoment and motor control redevelopment activities. Pt tolerates this well without an increase in pain reported, though instruction was given to maintain pain free ranges and contractions today.       Patient will continue to benefit from skilled outpatient physical therapy to address the deficits listed in the problem list box on initial evaluation, provide pt/family education and to maximize pt's level of independence in the home and community environment.     Patient's spiritual, cultural, and educational needs considered and patient agreeable to plan of care and goals.         Plan: Plan to progress per tolerance within current POC    Goals:   Active       Functional outcome       Patient will show a significant change in FOTO score to 75 or better to demonstrate subjective improvement in overall function.        Start:  04/09/25    Expected End:  05/21/25            Patient will demonstrate independence in home program for support of progression       Start:  04/09/25    Expected End:  04/30/25               Pain       Patient will report pain of 5/10 at worst demonstrating a reduction of overall pain       Start:  04/09/25    Expected End:  04/30/25               Range of Motion       Patient will achieve shoulder AROM to stated goals in objective section of evaluation.       Start:  04/09/25    Expected End:  05/21/25               Strength       Patient will demonstrate strength improvements to stated goals in objective section of evaluation.       Start:  04/09/25    Expected End:  05/21/25                Radha Valle PT

## 2025-04-21 ENCOUNTER — CLINICAL SUPPORT (OUTPATIENT)
Facility: HOSPITAL | Age: 70
End: 2025-04-21
Payer: MEDICARE

## 2025-04-21 DIAGNOSIS — M54.31 SCIATICA OF RIGHT SIDE: Primary | ICD-10-CM

## 2025-04-21 PROCEDURE — 97814 ACUP 1/> W/ESTIM EA ADDL 15: CPT | Mod: HCNC | Performed by: ACUPUNCTURIST

## 2025-04-21 PROCEDURE — 97813 ACUP 1/> W/ESTIM 1ST 15 MIN: CPT | Mod: HCNC | Performed by: ACUPUNCTURIST
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
## 2025-04-22 ENCOUNTER — CLINICAL SUPPORT (OUTPATIENT)
Dept: REHABILITATION | Facility: HOSPITAL | Age: 70
End: 2025-04-22
Payer: MEDICARE

## 2025-04-22 DIAGNOSIS — R29.898 WEAKNESS OF LEFT SHOULDER: Primary | ICD-10-CM

## 2025-04-22 PROCEDURE — 97110 THERAPEUTIC EXERCISES: CPT | Mod: HCNC,PN

## 2025-04-22 PROCEDURE — 97112 NEUROMUSCULAR REEDUCATION: CPT | Mod: HCNC,PN

## 2025-04-22 NOTE — PROGRESS NOTES
"  Outpatient Rehab    Physical Therapy Visit    Patient Name: Mouna Chandra  MRN: [id number]  YOB: 1955  Encounter Date: 4/22/2025    Therapy Diagnosis:   Encounter Diagnosis   Name Primary?    Weakness of left shoulder Yes       Physician: Ofe Velez MD    Physician Orders: Eval and Treat  Medical Diagnosis: Left shoulder pain, unspecified chronicity    Visit # / Visits Authorized:  2 / 20  Insurance Authorization Period: 4/5/2025 to 3/20/2027  Date of Evaluation: 4/8/2025  Plan of Care Certification: 4/8/2025 to 5/21/2025      Time In: 1425   Time Out: 1520  Total Time: 55   Total Billable Time:  55 minutes 1:1 with PT     FOTO: 1/3         Subjective   Patient reports overall she has been doing better pain wise.  Pain reported as 4/10.      Objective    To be reassessed at next progress note/ plan of care        Treatment:       CPT Intervention  JointFocus Duration / Intensity  4/22/2025 Duration / Intensity  4/16/2025   TE UBE  2'/2'  2'/2'   TE L shoulder AAROM   wand flex, abd,  in supine x 20 each  wand flex, abd, ER in supine x 15 each    NMR  ABCs Wall x 1   supine x 2    TE  Pulleys  flex, abd 2 minutes each  flex, abd 2 minutes each    NMR  Rows  Green cord 2 x 12  Green cord 2 x 12    NMR  Shoulder isometrics  IR, ER, Flex, abd x 15 10"   IR, ER, Flex, abd x 15 10"    TE SL shoulder ER 2 x 10      NMR Rhythmic stabilization 3 x 30"      TE  Wall slides  scaption x 15  scaption x 10   NMR  B shoulder extension  Green cord 2 x 10  Green cord 2 x 10    NMR Wall push ups  X 10 X 10   PLAN               CPT Codes available for Billing:   (-) minutes of Manual therapy (MT) to improve pain and ROM.  (25) minutes of Therapeutic Exercise (TE) to develop strength, endurance, range of motion, and flexibility.  (30) minutes of Neuromuscular Re-Education (NMR)  to improve: Balance, Coordination, Kinesthetic, Sense, Proprioception, and Posture.  (-) minutes of Therapeutic Activities " (TA) to improve functional performance.  (-) minutes of Gait Training (GT) to improve functional mobility and safety  Unattended Electrical Stimulation (ES) for muscle performance or pain modulation.  Vasopneumatic Device Therapy () for management of swelling/edema. (41484)  BFR: Blood flow restriction applied during exercise  NP or (-): Not Performed    Assessment & Plan   Assessment: Patient presents with minor pain improvements today and better activity tolerance. Continued treatment with focus on AAROM for mobility develpoment and motor control redevelopment activities. Pt did have increased pain at start of session with mobility work, but pain was lowered with isometric work and return to pain free moblity work later in session. Pt notes improved upon leaving session today.       Patient will continue to benefit from skilled outpatient physical therapy to address the deficits listed in the problem list box on initial evaluation, provide pt/family education and to maximize pt's level of independence in the home and community environment.     Patient's spiritual, cultural, and educational needs considered and patient agreeable to plan of care and goals.         Plan: Plan to progress per tolerance within current POC    Goals:   Active       Functional outcome       Patient will show a significant change in FOTO score to 75 or better to demonstrate subjective improvement in overall function.        Start:  04/09/25    Expected End:  05/21/25            Patient will demonstrate independence in home program for support of progression       Start:  04/09/25    Expected End:  04/30/25               Pain       Patient will report pain of 5/10 at worst demonstrating a reduction of overall pain       Start:  04/09/25    Expected End:  04/30/25               Range of Motion       Patient will achieve shoulder AROM to stated goals in objective section of evaluation.       Start:  04/09/25    Expected End:  05/21/25                Strength       Patient will demonstrate strength improvements to stated goals in objective section of evaluation.       Start:  04/09/25    Expected End:  05/21/25                Radha Valle PT

## 2025-04-28 ENCOUNTER — PATIENT MESSAGE (OUTPATIENT)
Facility: HOSPITAL | Age: 70
End: 2025-04-28

## 2025-04-30 ENCOUNTER — CLINICAL SUPPORT (OUTPATIENT)
Dept: REHABILITATION | Facility: HOSPITAL | Age: 70
End: 2025-04-30
Payer: MEDICARE

## 2025-04-30 DIAGNOSIS — R29.898 WEAKNESS OF LEFT SHOULDER: Primary | ICD-10-CM

## 2025-04-30 PROCEDURE — 97110 THERAPEUTIC EXERCISES: CPT | Mod: HCNC,PN

## 2025-04-30 PROCEDURE — 97112 NEUROMUSCULAR REEDUCATION: CPT | Mod: HCNC,PN

## 2025-04-30 NOTE — PROGRESS NOTES
"  Outpatient Rehab    Physical Therapy Visit    Patient Name: Mouna Chandra  MRN: [id number]  YOB: 1955  Encounter Date: 4/30/2025    Therapy Diagnosis:   Encounter Diagnosis   Name Primary?    Weakness of left shoulder Yes       Physician: Ofe Velez MD    Physician Orders: Eval and Treat  Medical Diagnosis: Left shoulder pain, unspecified chronicity    Visit # / Visits Authorized:  3 / 20  Insurance Authorization Period: 4/5/2025 to 3/20/2027  Date of Evaluation: 4/8/2025  Plan of Care Certification: 4/8/2025 to 5/21/2025      Time In: 1500   Time Out: 1555  Total Time: 55   Total Billable Time:  30 minutes 1:1 with PT     FOTO: 1/3       Subjective   She has been busy the last few days that she thinks has led to an increase in pain today.  Pain reported as 6/10.      Objective    To be reassessed at next progress note/ plan of care        Treatment:       CPT Intervention  JointFocus Duration / Intensity  4/30/2025 Duration / Intensity  4/22/2025 Duration / Intensity  4/16/2025   TE UBE  2'/2' 2'/2'  2'/2'   TE L shoulder AAROM   wand flex, abd,  in supine x 20 each  wand flex, abd,  in supine x 20 each  wand flex, abd, ER in supine x 15 each    NMR  ABCs Wall x 1 Wall x 1   supine x 2    TE  Pulleys  Flex,abd 2 minutes flex, abd 2 minutes each  flex, abd 2 minutes each    NMR  Rows  Green Cord Green cord 2 x 12  Green cord 2 x 12    NMR  Shoulder isometrics  IR, ER, Flex, abd x 15 10"  IR, ER, Flex, abd x 15 10"   IR, ER, Flex, abd x 15 10"    TE SL shoulder ER  2x10 2 x 10      TE Rhythmic stabilization - 3 x 30"      TE  Wall slides  scaption x 15 scaption x 15  scaption x 10   NMR  B shoulder extension  Green cord 2x10 Green cord 2 x 10  Green cord 2 x 10    NMR Wall push ups  X 10 X 10 X 10   PLAN                CPT Codes available for Billing:   (-) minutes of Manual therapy (MT) to improve pain and ROM.  (15) minutes of Therapeutic Exercise (TE) to develop strength, " endurance, range of motion, and flexibility.  (15) minutes of Neuromuscular Re-Education (NMR)  to improve: Balance, Coordination, Kinesthetic, Sense, Proprioception, and Posture.  (-) minutes of Therapeutic Activities (TA) to improve functional performance.  (-) minutes of Gait Training (GT) to improve functional mobility and safety  Unattended Electrical Stimulation (ES) for muscle performance or pain modulation.  Vasopneumatic Device Therapy () for management of swelling/edema. (48050)  BFR: Blood flow restriction applied during exercise  NP or (-): Not Performed    Assessment & Plan   Assessment: Patient presents with a slight increase in pain due to activity this week helping to care for family member. Continued treatment with focus on AAROM for mobility develpoment and motor control redevelopment activities. No progressions were made today due to pain levels present.  Patient denies any significant pain with increase with treatment today.       Patient will continue to benefit from skilled outpatient physical therapy to address the deficits listed in the problem list box on initial evaluation, provide pt/family education and to maximize pt's level of independence in the home and community environment.     Patient's spiritual, cultural, and educational needs considered and patient agreeable to plan of care and goals.         Plan: Plan to progress per tolerance within current POC    Goals:   Active       Functional outcome       Patient will show a significant change in FOTO score to 75 or better to demonstrate subjective improvement in overall function.        Start:  04/09/25    Expected End:  05/21/25            Patient will demonstrate independence in home program for support of progression       Start:  04/09/25    Expected End:  04/30/25               Pain       Patient will report pain of 5/10 at worst demonstrating a reduction of overall pain       Start:  04/09/25    Expected End:  04/30/25                Range of Motion       Patient will achieve shoulder AROM to stated goals in objective section of evaluation.       Start:  04/09/25    Expected End:  05/21/25               Strength       Patient will demonstrate strength improvements to stated goals in objective section of evaluation.       Start:  04/09/25    Expected End:  05/21/25                Radha Valle PT

## 2025-05-01 ENCOUNTER — CLINICAL SUPPORT (OUTPATIENT)
Facility: HOSPITAL | Age: 70
End: 2025-05-01
Payer: MEDICARE

## 2025-05-01 DIAGNOSIS — M54.31 SCIATICA OF RIGHT SIDE: Primary | ICD-10-CM

## 2025-05-01 PROCEDURE — 97814 ACUP 1/> W/ESTIM EA ADDL 15: CPT | Mod: HCNC | Performed by: ACUPUNCTURIST

## 2025-05-01 PROCEDURE — 97813 ACUP 1/> W/ESTIM 1ST 15 MIN: CPT | Mod: HCNC | Performed by: ACUPUNCTURIST
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
## 2025-05-05 ENCOUNTER — CLINICAL SUPPORT (OUTPATIENT)
Facility: HOSPITAL | Age: 70
End: 2025-05-05
Payer: MEDICARE

## 2025-05-05 DIAGNOSIS — M54.31 SCIATICA OF RIGHT SIDE: Primary | ICD-10-CM

## 2025-05-05 PROCEDURE — 97813 ACUP 1/> W/ESTIM 1ST 15 MIN: CPT | Mod: HCNC | Performed by: ACUPUNCTURIST

## 2025-05-05 PROCEDURE — 97814 ACUP 1/> W/ESTIM EA ADDL 15: CPT | Mod: HCNC | Performed by: ACUPUNCTURIST
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
## 2025-05-06 ENCOUNTER — CLINICAL SUPPORT (OUTPATIENT)
Dept: REHABILITATION | Facility: HOSPITAL | Age: 70
End: 2025-05-06
Payer: MEDICARE

## 2025-05-06 DIAGNOSIS — R29.898 WEAKNESS OF LEFT SHOULDER: Primary | ICD-10-CM

## 2025-05-06 PROCEDURE — 97112 NEUROMUSCULAR REEDUCATION: CPT | Mod: HCNC,PN

## 2025-05-06 PROCEDURE — 97110 THERAPEUTIC EXERCISES: CPT | Mod: HCNC,PN

## 2025-05-06 NOTE — PROGRESS NOTES
"  Outpatient Rehab    Physical Therapy Visit    Patient Name: Mouna Chandra  MRN: [id number]  YOB: 1955  Encounter Date: 5/6/2025    Therapy Diagnosis:   Encounter Diagnosis   Name Primary?    Weakness of left shoulder Yes       Physician: Ofe Velez MD    Physician Orders: Eval and Treat  Medical Diagnosis: Left shoulder pain, unspecified chronicity    Visit # / Visits Authorized:  4 / 20  Insurance Authorization Period: 5/1/2025 to 7/6/2025  Date of Evaluation: 4/8/2025  Plan of Care Certification: 4/8/2025 to 5/21/2025      Time In: 1440   Time Out: 1535  Total Time: 55   Total Billable Time:  40 minutes 1:1 with PT     FOTO: 2/3      Subjective   She has been having to do more caring for family member today which has caused continued pain increased..  Pain reported as 4/10.      Objective    To be reassessed at next progress note/ plan of care        Treatment:        CPT Intervention  JointFocus Duration / Intensity  5/6/2025 Duration / Intensity  4/30/2025 Duration / Intensity  4/22/2025   TE UBE  2'/2' 2'/2' 2'/2'   TE L shoulder AAROM   wand flex, abd, ER  in supine x 20 each  wand flex, abd,  in supine x 20 each  wand flex, abd,  in supine x 20 each    NMR  ABCs Wall x 2 Wall x 1 Wall x 1    TE  Pulleys  Flex,abd 2 minutes Flex,abd 2 minutes flex, abd 2 minutes each   NMR  Rows  Green cord 2 x 12 Green Cord Green cord 2 x 12   NMR  Shoulder isometrics  IR, ER, Flex, abd x 15 10"  IR, ER, Flex, abd x 15 10"  IR, ER, Flex, abd x 15 10"    TE SL shoulder ER 2 x 10   2x10 2 x 10    NMR B shoulder ER  YTB x 15        TE  Wall slides  scaption x 20 scaption x 15 scaption x 15   NMR  B shoulder extension  Green cord 2x10 Green cord 2x10 Green cord 2 x 10   NMR Wall push ups  2 x 10  X 10 X 10   PLAN                 CPT Codes available for Billing:   (-) minutes of Manual therapy (MT) to improve pain and ROM.  (15) minutes of Therapeutic Exercise (TE) to develop strength, " endurance, range of motion, and flexibility.  (25) minutes of Neuromuscular Re-Education (NMR)  to improve: Balance, Coordination, Kinesthetic, Sense, Proprioception, and Posture.  (-) minutes of Therapeutic Activities (TA) to improve functional performance.  (-) minutes of Gait Training (GT) to improve functional mobility and safety  Unattended Electrical Stimulation (ES) for muscle performance or pain modulation.  Vasopneumatic Device Therapy () for management of swelling/edema. 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 However, it is better than it initally was. Continued treatment with focus on AAROM for mobility develpoment and motor control redevelopment activities. Progressions were able to be made today with good tolerance and minimal cueing.       Patient will continue to benefit from skilled outpatient physical therapy to address the deficits listed in the problem list box on initial evaluation, provide pt/family education and to maximize pt's level of independence in the home and community environment.     Patient's spiritual, cultural, and educational needs considered and patient agreeable to plan of care and goals.         Plan: Plan to progress per tolerance within current POC    Goals:   Active       Functional outcome       Patient will show a significant change in FOTO score to 75 or better to demonstrate subjective improvement in overall function.        Start:  04/09/25    Expected End:  05/21/25            Patient will demonstrate independence in home program for support of progression       Start:  04/09/25    Expected End:  04/30/25               Pain       Patient will report pain of 5/10 at worst demonstrating a reduction of overall pain       Start:  04/09/25    Expected End:  04/30/25               Range of Motion       Patient  will achieve shoulder AROM to stated goals in objective section of evaluation.       Start:  04/09/25    Expected End:  05/21/25               Strength       Patient will demonstrate strength improvements to stated goals in objective section of evaluation.       Start:  04/09/25    Expected End:  05/21/25                Radha Valle PT

## 2025-05-09 ENCOUNTER — OFFICE VISIT (OUTPATIENT)
Dept: OPHTHALMOLOGY | Facility: CLINIC | Age: 70
End: 2025-05-09
Payer: MEDICARE

## 2025-05-09 DIAGNOSIS — H35.363 FAMILIAL DRUSEN OF BOTH EYES: Primary | ICD-10-CM

## 2025-05-09 DIAGNOSIS — H35.3133 ADVANCED ATROPHIC NONEXUDATIVE AGE-RELATED MACULAR DEGENERATION OF BOTH EYES WITHOUT SUBFOVEAL INVOLVEMENT: ICD-10-CM

## 2025-05-09 PROCEDURE — 99999 PR PBB SHADOW E&M-EST. PATIENT-LVL III: CPT | Mod: PBBFAC,HCNC,, | Performed by: OPHTHALMOLOGY

## 2025-05-09 NOTE — PROGRESS NOTES
===============================  Mouna Chandra,  5/9/2025 today   70 y.o. female   Last visit [facility]: :2/26/2024   Last visit eye dept. Visit date not found  [facility]:  Corrected distance visual acuity was 20/40 in the right eye and 20/30 in the left eye.  Tonometry       Tonometry (Applanation, 9:57 AM)         Right Left    Pressure 17 16                  Wearing Rx       Wearing Rx         Sphere Cylinder Axis Add    Right -5.00 +0.50 180 +2.50    Left -7.00 +1.25 180 +2.50                  Manifest Refraction       Manifest Refraction         Sphere Cylinder [place] Dist [facility]    Right -5.00 +0.50 180 20/25    Left -7.25 +1.50 180 20/30                  Not recorded       Chief Complaint   Patient presents with    Famillial drusen of both eyes     Yearly exam/   needs new glasses/  seeing floaters       ________________  5/9/2025 today  HPI     Famillial drusen of both eyes            Comments: Yearly exam/   needs new glasses/  seeing floaters          Comments    Familial drusen  blepharitis          Last edited by Dian Lowe on 5/9/2025  9:45 AM.      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 Check OU daily.   Gave my office and cell phone number.  ===============================

## 2025-05-12 ENCOUNTER — CLINICAL SUPPORT (OUTPATIENT)
Facility: HOSPITAL | Age: 70
End: 2025-05-12
Payer: MEDICARE

## 2025-05-12 ENCOUNTER — PATIENT MESSAGE (OUTPATIENT)
Facility: HOSPITAL | Age: 70
End: 2025-05-12

## 2025-05-12 DIAGNOSIS — M54.31 SCIATICA OF RIGHT SIDE: Primary | ICD-10-CM

## 2025-05-12 PROCEDURE — 97813 ACUP 1/> W/ESTIM 1ST 15 MIN: CPT | Mod: HCNC | Performed by: ACUPUNCTURIST

## 2025-05-12 PROCEDURE — 97814 ACUP 1/> W/ESTIM EA ADDL 15: CPT | Mod: HCNC | Performed by: ACUPUNCTURIST
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
## 2025-05-13 ENCOUNTER — CLINICAL SUPPORT (OUTPATIENT)
Dept: REHABILITATION | Facility: HOSPITAL | Age: 70
End: 2025-05-13
Payer: MEDICARE

## 2025-05-13 DIAGNOSIS — R29.898 WEAKNESS OF LEFT SHOULDER: Primary | ICD-10-CM

## 2025-05-13 PROCEDURE — 97110 THERAPEUTIC EXERCISES: CPT | Mod: HCNC,PN

## 2025-05-13 PROCEDURE — 97112 NEUROMUSCULAR REEDUCATION: CPT | Mod: HCNC,PN

## 2025-05-13 NOTE — PROGRESS NOTES
"  Outpatient Rehab    Physical Therapy Visit    Patient Name: Mouna Chandra  MRN: [id number]  YOB: 1955  Encounter Date: 5/13/2025    Therapy Diagnosis:   Encounter Diagnosis   Name Primary?    Weakness of left shoulder Yes       Physician: Ofe Velez MD    Physician Orders: Eval and Treat  Medical Diagnosis: Left shoulder pain, unspecified chronicity    Visit # / Visits Authorized:  5 / 20  Insurance Authorization Period: 5/1/2025 to 7/6/2025  Date of Evaluation: 4/8/2025  Plan of Care Certification: 4/8/2025 to 5/21/2025      Time In: 1430   Time Out: 1530  Total Time: 60   Total Billable Time:  30 minutes 1:1 with PT     FOTO: 2/3      Subjective   She has been doing okay over the past week with noted improvement in pain intensity..  Pain reported as 3/10.      Objective    To be reassessed at next progress note/ plan of care        Treatment:         CPT Intervention  [facility] Duration / Intensity  5/13/2025 Duration / Intensity  5/6/2025 Duration / Intensity  4/30/2025   TE UBE  2'/2' 2'/2' 2'/2'   TE L shoulder AAROM  wand flex, abd, ER  in supine x 20 each  wand flex, abd, ER  in supine x 20 each  wand flex, abd,  in supine x 20 each    NMR  ABCs Wall x 2  Wall x 2 Wall x 1   TE Sleeper stretch  10 x 10"        TE  Pulleys  - Flex,abd 2 minutes Flex,abd 2 minutes   NMR  Rows  Green cord 2 x 12 Green cord 2 x 12 Green Cord   NMR  Shoulder isometrics  IR, ER, Flex, abd x 15 10"  IR, ER, Flex, abd x 15 10"  IR, ER, Flex, abd x 15 10"    TE SL shoulder ER 2 x 10  2 x 10   2x10   NMR B shoulder ER  - YTB x 15      TE  Wall slides  Abduction 2 x 10   scaption x 20 scaption x 15   NMR L shoulder Isometric walkout Yellow cord x 10 IR, ER       NMR  B shoulder extension  Green cord 2x10 Green cord 2x10 Green cord 2x10   NMR Wall push ups  2 x 12 2 x 10  X 10   PLAN                  CPT Codes available for Billing:   (-) minutes of Manual therapy (MT) to improve pain and ROM.  (10) " minutes of Therapeutic Exercise (TE) to develop strength, endurance, range of motion, and flexibility.  (20) minutes of Neuromuscular Re-Education (NMR)  to improve: Balance, Coordination, Kinesthetic, Sense, Proprioception, and Posture.  (-) minutes of Therapeutic Activities (TA) to improve functional performance.  (-) minutes of Gait Training (GT) to improve functional mobility and safety  Unattended Electrical Stimulation (ES) for muscle performance or pain modulation.  Vasopneumatic Device Therapy () for management of swelling/edema. (81148)  BFR: Blood flow restriction applied during exercise  NP or (-): Not Performed    Assessment & Plan   Assessment: Patient presents with reported overall symptom improvement today. Continued treatment with focus on AAROM for mobility develpoment and motor control redevelopment activities with progressions to focus on more rotational stability and mobility. Pt tolerates this with fatigue increase noted, but no pain.       Patient will continue to benefit from skilled outpatient physical therapy to address the deficits listed in the problem list box on initial evaluation, provide pt/family education and to maximize pt's level of independence in the home and community environment.     Patient's spiritual, cultural, and educational needs considered and patient agreeable to plan of care and goals.         Plan: Plan to progress per tolerance within current POC    Goals:   Active       Functional outcome       Patient will show a significant change in FOTO score to 75 or better to demonstrate subjective improvement in overall function.        Start:  04/09/25    Expected End:  05/21/25            Patient will demonstrate independence in home program for support of progression       Start:  04/09/25    Expected End:  04/30/25               Pain       Patient will report pain of 5/10 at worst demonstrating a reduction of overall pain       Start:  04/09/25    Expected End:   04/30/25               Range of Motion       Patient will achieve shoulder AROM to stated goals in objective section of evaluation.       Start:  04/09/25    Expected End:  05/21/25               Strength       Patient will demonstrate strength improvements to stated goals in objective section of evaluation.       Start:  04/09/25    Expected End:  05/21/25                Radha Valle PT

## 2025-05-19 ENCOUNTER — CLINICAL SUPPORT (OUTPATIENT)
Facility: HOSPITAL | Age: 70
End: 2025-05-19
Payer: MEDICARE

## 2025-05-19 DIAGNOSIS — M54.31 SCIATICA OF RIGHT SIDE: Primary | ICD-10-CM

## 2025-05-19 PROCEDURE — 97813 ACUP 1/> W/ESTIM 1ST 15 MIN: CPT | Mod: HCNC | Performed by: ACUPUNCTURIST

## 2025-05-19 PROCEDURE — 97814 ACUP 1/> W/ESTIM EA ADDL 15: CPT | Mod: HCNC | Performed by: ACUPUNCTURIST
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
## 2025-05-20 ENCOUNTER — TELEPHONE (OUTPATIENT)
Dept: OPHTHALMOLOGY | Facility: CLINIC | Age: 70
End: 2025-05-20
Payer: MEDICARE

## 2025-05-20 NOTE — TELEPHONE ENCOUNTER
Pt was called and given optical extension to see if her glasses were in.    ----- Message from Janett sent at 5/20/2025 10:33 AM CDT -----  Contact: self  Type:  Patient Returning CallWho Called:Mouna Bhandari Left Message for Patient:Does the patient know what this is regarding?:Would the patient rather a call back or a response via [facility]? Call [facility] Call Back Number:[phone] Information: pt returning a phone call that may have came from office

## 2025-06-05 ENCOUNTER — CLINICAL SUPPORT (OUTPATIENT)
Dept: AUDIOLOGY | Facility: CLINIC | Age: 70
End: 2025-06-05
Payer: MEDICARE

## 2025-06-05 DIAGNOSIS — H90.3 HEARING LOSS, SENSORINEURAL, ASYMMETRICAL: ICD-10-CM

## 2025-06-05 PROCEDURE — 92567 TYMPANOMETRY: CPT | Mod: S$GLB,,, | Performed by: AUDIOLOGIST

## 2025-06-05 PROCEDURE — 92557 COMPREHENSIVE HEARING TEST: CPT | Mod: S$GLB,,, | Performed by: AUDIOLOGIST

## 2025-06-05 PROCEDURE — 99999 PR PBB SHADOW E&M-EST. PATIENT-LVL I: CPT | Mod: PBBFAC,,, | Performed by: AUDIOLOGIST

## 2025-06-05 NOTE — PROGRESS NOTES
"Subjective:      Patient ID: Mouna Chandra is a 70 y.o. female.    Chief Complaint: Follow-up (3 month f/u )      HPI  Here for follow up of medical problems.  Now caregiving again.  Left shoulder much better now.  Going to gym 2-3x per week- treadmill and machines.  No f/c/sw/cough.  No cp/sob/palp.  BMs nromal.  Depression doing well on rx.    Updated/ annual due 3/26:  HM: 10/24 fluvax, 6/24 RSV, 3/17 [id number], 6/21 [id number], 11/16 Tdap, 10/16 zostavax, 6/21 Shingrix x2, 7/24 BMD on fosamax x 2.5y then holiday starting 1/25 rep 2y, 4/25 Cscope rep due 5-10y, 7/24 MMG, Eye Dr. Tobin, 5/21 HCV neg.     Review of Systems   Constitutional:  Negative for chills, diaphoresis and fever.   Respiratory:  Negative for cough and shortness of breath.    Cardiovascular:  Negative for chest pain, palpitations and leg swelling.   Gastrointestinal:  Negative for blood in stool, constipation, diarrhea, nausea and vomiting.   Genitourinary:  Negative for dysuria, frequency and hematuria.   Psychiatric/Behavioral:  The patient is not nervous/anxious.          Objective:   /60 (BP Location: Right arm, Patient Position: Sitting)   Pulse 80   Temp 97.5 °F (36.4 °C) (Skin)   Ht 5' 1" (1.549 m)   Wt 84.1 kg (185 lb 4.8 oz)   SpO2 97%   BMI 35.01 kg/m²     Physical Exam  Constitutional:       Appearance: She is well-developed.   Neck:      Thyroid: No thyroid mass.      Vascular: No carotid bruit.   Cardiovascular:      Rate and Rhythm: Normal rate and regular rhythm.      Heart sounds: No murmur heard.     No friction rub. No gallop.   Pulmonary:      Effort: Pulmonary effort is normal.      Breath sounds: Normal breath sounds. No wheezing or rales.   Abdominal:      General: Bowel sounds are normal.      Palpations: Abdomen is soft. There is no mass.      Tenderness: There is no abdominal tenderness.   Musculoskeletal:      Cervical back: Neck supple.   Lymphadenopathy:      Cervical: No cervical adenopathy. "   Neurological:      Mental Status: She is alert and oriented to person, place, and time.             Assessment:       1. Recurrent major depressive disorder, in partial remission    2. Mixed hyperlipidemia    3. Elevated coronary artery calcium score    4. Age-related osteoporosis without current pathological fracture    5. Multiple lung nodules on CT    6. Primary insomnia    7. Seasonal allergic rhinitis due to pollen          Plan:     1. Recurrent major depressive disorder, in partial remission  Overview:  Paxil 40mg daily.    Assessment & Plan:  Doing well, cont rx.    2. Mixed hyperlipidemia  Overview:  Atorva 40mg daily,  Zetia 10mg daily.    Assessment & Plan:  LDL at goal 54, cont rx.    3. Elevated coronary artery calcium score  Overview:  7/23 CT calcium:  Your total calcium score is 754.    On statin/ ASA.    Assessment & Plan:  Cont statin/asa/exercise.    4. Age-related osteoporosis without current pathological fracture  Overview:  on fosamax x 2.5y then holiday starting 1/25 DUE TO GI SX.    5. Multiple lung nodules on CT  Overview:  Multiple right lung sub 6 mm pulmonary nodules.  Consider diagnostic CT chest for complete evaluation.        Electronically signed by:Robby Olivares MD  Date:                                            07/27/2023    3/25 CT:  No detrimental change.     Assessment & Plan:  No further w/u needed.    6. Primary insomnia  Overview:  Trazodone 75mg qhs.    Assessment & Plan:  Doing well, cont rx.    7. Seasonal allergic rhinitis due to pollen  Assessment & Plan:  Restart flonase.    Orders:  -     fluticasone propionate (FLONASE) 50 mcg/actuation nasal spray; 2 sprays (100 mcg total) by Each Nostril route once daily.  Dispense: 16 g; Refill: 6     RTC 6mo.

## 2025-06-09 ENCOUNTER — CLINICAL SUPPORT (OUTPATIENT)
Facility: HOSPITAL | Age: 70
End: 2025-06-09
Payer: MEDICARE

## 2025-06-09 DIAGNOSIS — M54.31 SCIATICA OF RIGHT SIDE: Primary | ICD-10-CM

## 2025-06-09 PROCEDURE — 97813 ACUP 1/> W/ESTIM 1ST 15 MIN: CPT | Performed by: ACUPUNCTURIST

## 2025-06-09 PROCEDURE — 97814 ACUP 1/> W/ESTIM EA ADDL 15: CPT | Performed by: ACUPUNCTURIST
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
## 2025-06-13 ENCOUNTER — OFFICE VISIT (OUTPATIENT)
Dept: PRIMARY CARE CLINIC | Facility: CLINIC | Age: 70
End: 2025-06-13
Payer: MEDICARE

## 2025-06-13 VITALS
SYSTOLIC BLOOD PRESSURE: 122 MMHG | DIASTOLIC BLOOD PRESSURE: 60 MMHG | BODY MASS INDEX: 34.99 KG/M2 | HEART RATE: 80 BPM | HEIGHT: 61 IN | WEIGHT: 185.31 LBS | TEMPERATURE: 98 F | OXYGEN SATURATION: 97 %

## 2025-06-13 DIAGNOSIS — E78.2 MIXED HYPERLIPIDEMIA: Chronic | ICD-10-CM

## 2025-06-13 DIAGNOSIS — R93.1 ELEVATED CORONARY ARTERY CALCIUM SCORE: ICD-10-CM

## 2025-06-13 DIAGNOSIS — F33.41 RECURRENT MAJOR DEPRESSIVE DISORDER, IN PARTIAL REMISSION: Primary | ICD-10-CM

## 2025-06-13 DIAGNOSIS — J30.1 SEASONAL ALLERGIC RHINITIS DUE TO POLLEN: ICD-10-CM

## 2025-06-13 DIAGNOSIS — M81.0 AGE-RELATED OSTEOPOROSIS WITHOUT CURRENT PATHOLOGICAL FRACTURE: ICD-10-CM

## 2025-06-13 DIAGNOSIS — R91.8 MULTIPLE LUNG NODULES ON CT: ICD-10-CM

## 2025-06-13 DIAGNOSIS — F51.01 PRIMARY INSOMNIA: ICD-10-CM

## 2025-06-13 PROCEDURE — 99999 PR PBB SHADOW E&M-EST. PATIENT-LVL IV: CPT | Mod: PBBFAC,,, | Performed by: INTERNAL MEDICINE

## 2025-06-13 RX ORDER — FLUTICASONE PROPIONATE 50 MCG
2 SPRAY, SUSPENSION (ML) NASAL DAILY
Qty: 16 G | Refills: 6 | Status: SHIPPED | OUTPATIENT
Start: 2025-06-13

## 2025-06-16 ENCOUNTER — CLINICAL SUPPORT (OUTPATIENT)
Facility: HOSPITAL | Age: 70
End: 2025-06-16
Payer: MEDICARE

## 2025-06-16 DIAGNOSIS — M54.31 SCIATICA OF RIGHT SIDE: Primary | ICD-10-CM

## 2025-06-16 PROCEDURE — 97813 ACUP 1/> W/ESTIM 1ST 15 MIN: CPT | Performed by: ACUPUNCTURIST

## 2025-06-16 PROCEDURE — 97814 ACUP 1/> W/ESTIM EA ADDL 15: CPT | Performed by: ACUPUNCTURIST
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
## 2025-06-30 ENCOUNTER — CLINICAL SUPPORT (OUTPATIENT)
Facility: HOSPITAL | Age: 70
End: 2025-06-30
Payer: MEDICARE

## 2025-06-30 DIAGNOSIS — M54.31 SCIATICA OF RIGHT SIDE: Primary | ICD-10-CM

## 2025-06-30 PROCEDURE — 97813 ACUP 1/> W/ESTIM 1ST 15 MIN: CPT | Performed by: ACUPUNCTURIST

## 2025-06-30 PROCEDURE — 97814 ACUP 1/> W/ESTIM EA ADDL 15: CPT | Performed by: ACUPUNCTURIST
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
## 2025-07-07 ENCOUNTER — HOSPITAL ENCOUNTER (OUTPATIENT)
Dept: RADIOLOGY | Facility: HOSPITAL | Age: 70
Discharge: HOME | End: 2025-07-07
Attending: STUDENT IN AN ORGANIZED HEALTH CARE EDUCATION/TRAINING PROGRAM
Payer: MEDICARE

## 2025-07-07 ENCOUNTER — RESULTS FOLLOW-UP (OUTPATIENT)
Dept: FAMILY MEDICINE | Facility: CLINIC | Age: 70
End: 2025-07-07

## 2025-07-07 ENCOUNTER — OFFICE VISIT (OUTPATIENT)
Dept: FAMILY MEDICINE | Facility: CLINIC | Age: 70
End: 2025-07-07
Payer: MEDICARE

## 2025-07-07 ENCOUNTER — PATIENT MESSAGE (OUTPATIENT)
Dept: FAMILY MEDICINE | Facility: CLINIC | Age: 70
End: 2025-07-07

## 2025-07-07 ENCOUNTER — NURSE TRIAGE (OUTPATIENT)
Dept: ADMINISTRATIVE | Facility: CLINIC | Age: 70
End: 2025-07-07
Payer: MEDICARE

## 2025-07-07 VITALS
DIASTOLIC BLOOD PRESSURE: 62 MMHG | BODY MASS INDEX: 34.15 KG/M2 | HEIGHT: 61 IN | OXYGEN SATURATION: 99 % | HEART RATE: 75 BPM | SYSTOLIC BLOOD PRESSURE: 124 MMHG | WEIGHT: 180.88 LBS

## 2025-07-07 DIAGNOSIS — M25.512 ACUTE PAIN OF LEFT SHOULDER: Primary | ICD-10-CM
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
## 2025-07-07 DIAGNOSIS — M25.512 ACUTE PAIN OF LEFT SHOULDER: ICD-10-CM
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
## 2025-07-07 PROCEDURE — 1157F ADVNC CARE PLAN IN RCRD: CPT | Mod: CPTII,HCNC,S$GLB, | Performed by: STUDENT IN AN ORGANIZED HEALTH CARE EDUCATION/TRAINING PROGRAM

## 2025-07-07 PROCEDURE — 73030 X-RAY EXAM OF SHOULDER: CPT | Mod: TC,HCNC,FY,PN,LT

## 2025-07-07 PROCEDURE — 3074F SYST BP LT 130 MM HG: CPT | Mod: CPTII,HCNC,S$GLB, | Performed by: STUDENT IN AN ORGANIZED HEALTH CARE EDUCATION/TRAINING PROGRAM

## 2025-07-07 PROCEDURE — 3008F BODY MASS INDEX DOCD: CPT | Mod: CPTII,HCNC,S$GLB, | Performed by: STUDENT IN AN ORGANIZED HEALTH CARE EDUCATION/TRAINING PROGRAM

## 2025-07-07 PROCEDURE — 3078F DIAST BP <80 MM HG: CPT | Mod: CPTII,HCNC,S$GLB, | Performed by: STUDENT IN AN ORGANIZED HEALTH CARE EDUCATION/TRAINING PROGRAM

## 2025-07-07 PROCEDURE — 1101F PT FALLS ASSESS-DOCD LE1/YR: CPT | Mod: CPTII,HCNC,S$GLB, | Performed by: STUDENT IN AN ORGANIZED HEALTH CARE EDUCATION/TRAINING PROGRAM

## 2025-07-07 PROCEDURE — 3288F FALL RISK ASSESSMENT DOCD: CPT | Mod: CPTII,HCNC,S$GLB, | Performed by: STUDENT IN AN ORGANIZED HEALTH CARE EDUCATION/TRAINING PROGRAM

## 2025-07-07 PROCEDURE — 1159F MED LIST DOCD IN RCRD: CPT | Mod: CPTII,HCNC,S$GLB, | Performed by: STUDENT IN AN ORGANIZED HEALTH CARE EDUCATION/TRAINING PROGRAM

## 2025-07-07 PROCEDURE — 96372 THER/PROPH/DIAG INJ SC/IM: CPT | Mod: HCNC,S$GLB,, | Performed by: STUDENT IN AN ORGANIZED HEALTH CARE EDUCATION/TRAINING PROGRAM

## 2025-07-07 PROCEDURE — 1125F AMNT PAIN NOTED PAIN PRSNT: CPT | Mod: CPTII,HCNC,S$GLB, | Performed by: STUDENT IN AN ORGANIZED HEALTH CARE EDUCATION/TRAINING PROGRAM

## 2025-07-07 PROCEDURE — 99214 OFFICE O/P EST MOD 30 MIN: CPT | Mod: HCNC,25,S$GLB, | Performed by: STUDENT IN AN ORGANIZED HEALTH CARE EDUCATION/TRAINING PROGRAM

## 2025-07-07 PROCEDURE — 99999 PR PBB SHADOW E&M-EST. PATIENT-LVL V: CPT | Mod: PBBFAC,HCNC,, | Performed by: STUDENT IN AN ORGANIZED HEALTH CARE EDUCATION/TRAINING PROGRAM

## 2025-07-07 PROCEDURE — 73030 X-RAY EXAM OF SHOULDER: CPT | Mod: 26,HCNC,LT, | Performed by: RADIOLOGY

## 2025-07-07 RX ORDER — CYCLOBENZAPRINE HCL 10 MG
10 TABLET ORAL NIGHTLY PRN
Qty: 10 TABLET | Refills: 0 | Status: SHIPPED | OUTPATIENT
Start: 2025-07-07 | End: 2025-07-17

## 2025-07-07 RX ORDER — NAPROXEN 500 MG/1
500 TABLET ORAL 2 TIMES DAILY PRN
Qty: 60 TABLET | Refills: 0 | Status: SHIPPED | OUTPATIENT
Start: 2025-07-07

## 2025-07-07 RX ORDER — TRAMADOL HYDROCHLORIDE 50 MG/1
50 TABLET, FILM COATED ORAL EVERY 12 HOURS PRN
Qty: 10 TABLET | Refills: 0 | Status: SHIPPED | OUTPATIENT
Start: 2025-07-07

## 2025-07-07 RX ORDER — METHYLPREDNISOLONE 4 MG/1
TABLET ORAL
Qty: 1 EACH | Refills: 0 | Status: SHIPPED | OUTPATIENT
Start: 2025-07-07

## 2025-07-07 RX ORDER — KETOROLAC TROMETHAMINE 30 MG/ML
30 INJECTION, SOLUTION INTRAMUSCULAR; INTRAVENOUS
Status: COMPLETED | OUTPATIENT
Start: 2025-07-07 | End: 2025-07-07

## 2025-07-07 RX ADMIN — KETOROLAC TROMETHAMINE 30 MG: 30 INJECTION, SOLUTION INTRAMUSCULAR; INTRAVENOUS at 09:07
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
## 2025-07-08 ENCOUNTER — PATIENT MESSAGE (OUTPATIENT)
Dept: PRIMARY CARE CLINIC | Facility: CLINIC | Age: 70
End: 2025-07-08
Payer: MEDICARE

## 2025-07-08 DIAGNOSIS — M25.512 ACUTE PAIN OF LEFT SHOULDER: Primary | ICD-10-CM

## 2025-07-09 DIAGNOSIS — M47.812 CERVICAL FACET JOINT SYNDROME: Primary | ICD-10-CM

## 2025-07-09 RX ORDER — HYDROCODONE BITARTRATE AND ACETAMINOPHEN 7.5; 325 MG/1; MG/1
1 TABLET ORAL EVERY 6 HOURS PRN
Qty: 12 TABLET | Refills: 0 | Status: CANCELLED | OUTPATIENT
Start: 2025-07-09

## 2025-07-09 RX ORDER — HYDROCODONE BITARTRATE AND ACETAMINOPHEN 5; 325 MG/1; MG/1
1-2 TABLET ORAL EVERY 6 HOURS PRN
Qty: 32 TABLET | Refills: 0 | Status: SHIPPED | OUTPATIENT
Start: 2025-07-09

## 2025-07-10 DIAGNOSIS — I25.10 CORONARY ARTERY CALCIFICATION SEEN ON CT SCAN: ICD-10-CM

## 2025-07-10 DIAGNOSIS — F41.9 ANXIETY AND DEPRESSION: ICD-10-CM

## 2025-07-10 DIAGNOSIS — F32.A ANXIETY AND DEPRESSION: ICD-10-CM

## 2025-07-10 RX ORDER — ATORVASTATIN CALCIUM 40 MG/1
40 TABLET, FILM COATED ORAL DAILY
Qty: 90 TABLET | Refills: 3 | Status: SHIPPED | OUTPATIENT
Start: 2025-07-10

## 2025-07-10 RX ORDER — PAROXETINE 40 MG/1
40 TABLET, FILM COATED ORAL EVERY MORNING
Qty: 90 TABLET | Refills: 3 | Status: SHIPPED | OUTPATIENT
Start: 2025-07-10

## 2025-07-16 ENCOUNTER — CLINICAL SUPPORT (OUTPATIENT)
Dept: REHABILITATION | Facility: HOSPITAL | Age: 70
End: 2025-07-16
Payer: MEDICARE

## 2025-07-16 DIAGNOSIS — R29.898 WEAKNESS OF LEFT SHOULDER: Primary | ICD-10-CM

## 2025-07-16 DIAGNOSIS — M25.512 ACUTE PAIN OF LEFT SHOULDER: ICD-10-CM

## 2025-07-16 DIAGNOSIS — R29.898 WEAKNESS OF LEFT UPPER EXTREMITY: ICD-10-CM

## 2025-07-16 PROCEDURE — 97530 THERAPEUTIC ACTIVITIES: CPT | Mod: HCNC,PN

## 2025-07-16 PROCEDURE — 97161 PT EVAL LOW COMPLEX 20 MIN: CPT | Mod: HCNC,PN

## 2025-07-16 NOTE — PROGRESS NOTES
Outpatient Rehab    Physical Therapy Evaluation    Patient Name: Mouna Chandra  MRN: [id number]  YOB: 1955  Encounter Date: 7/16/2025    Therapy Diagnosis:   Encounter Diagnoses   Name Primary?    Acute pain of left shoulder     Weakness of left shoulder Yes    Weakness of left upper extremity      Physician: Kacie Nguyen MD    Physician Orders: Eval and Treat  Medical Diagnosis: Acute pain of left shoulder  Surgical Diagnosis: Not applicable for this Episode   Surgical Date: Not applicable for this Episode  Days Since Last Surgery: Not applicable for this Episode    Visit # / Visits Authorized:  1 / 1  Insurance Authorization Period: 7/7/2025 to 7/7/2026  Date of Evaluation: 7/16/2025  Plan of Care Certification: 7/16/2025 to 8/13/2025     Time In: 1440   Time Out: 1525  Total Time (in minutes): 45   Total Billable Time (in minutes):  45 minutes 1:1     Intake Outcome Measure for FOTO Survey    Therapist reviewed FOTO scores for Mouna Chandra on 7/16/2025.   FOTO report - see Media section or FOTO account episode details.     Intake Score: 70%      Subjective   History of Present Illness  Mouna is a 70 y.o. female who reports to physical therapy with a chief concern of L shoulder.                 History of Present Condition/Illness: Patient reports with flare up in chronic L shoulder after hitting shoulder while tubing on 7/5/2025. Pt reports she reported to MD at end of last week and she was given medication that has helped with pain substantially. 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 Pt denies onset of numbness/ tingling at any point. Pt states pain did radiate down into her hand for a period after incident, but that has resolved.     Pain     Patient reports a current pain level of 0/10.  Pain at worst is reported as 10/10.   Location: L  shoulder  Pain Qualities: Aching, Tightness, Tenderness  Pain-Relieving Factors: Rest, Ice, Medications - prescription  Pain-Aggravating Factors: Sleeping, Cooking, Driving, Movement, Lifting, Reaching  Pain has been at a 1/10 at worst since MD visit on Friday of last week.       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    Mouna Chandra  has a past surgical history that includes Total abdominal hysterectomy w/ bilateral salpingoophorectomy (2002); Hysterectomy; Selective injection of anesthetic agent around lumbar spinal nerve root by transforaminal approach (Right, 02/25/2021); Injection of anesthetic agent around medial branch nerves innervating cervical facet joint (Bilateral, 04/14/2021); Colonoscopy; and Excision of ganglion cyst of hand (Right, 3/16/2023).    Mouna has a current medication list which includes the following prescription(s): aspirin, atorvastatin, buspirone, cholecalciferol (vitamin d3), cyclobenzaprine, ezetimibe, fluticasone propionate, gabapentin, hydrocodone-acetaminophen, lactobacillus rhamnosus gg, magnesium, methylprednisolone, naproxen, paroxetine, tramadol, trazodone, vit a/vit c/vit e/zinc/copper, and vitamin e.    Review of patient's allergies indicates:   Allergen Reactions    Adhesive Rash    Codeine Nausea And Vomiting    Iodine     Iodine containing multivitamin Nausea Only    Lanolin Hives    Latex, natural rubber Hives    Neosporin  [benzalkonium chloride] Hives    Shellfish containing products Nausea And Vomiting    Neosporin (neomycin-polymyx) Hives, Itching and Rash        Objective   RANGE OF MOTION:    Shoulder AROM  (Degrees)  Right Left Pain/Dysfunction with Movement Goal   Shoulder Flexion (180) 180 155 Pain left  at end range  180 L    Shoulder Abduction (180) 180 180 Painful left at end range  ---   Shoulder ER (90) T3/4 T3/4 --- ---   Shoulder IR (70) L3/4 T11/12 --- ---     STRENGTH:    U/E MMT Right Left Pain/Dysfunction with Movement Goal   Shoulder Flexion 4-/5 4-/5 --- 5/5 B   Shoulder Abduction  4-/5 4-/5 Pain increase L  5/5 B   Shoulder IR 4/5 4/5 -- 5/5 B   Shoulder ER 4/5 4-/5 -- 5/5 B   Elbow Flexion  5/5 5/5  ---   Elbow Extension 5/5 5/5  ---       MUSCLE LENGTH:     Muscle Tested  Left    Upper Trapezius  decreased   Pectoralis Minor  decreased   Pectoralis Major decreased       JOINT MOBILITY:     Joint Motion Tested Left    G-H joint inferior and posterior glides WNL and pain free        Palpation: TTP to L upper trapezius, pectoralis, biceps, and AC joint    Posture:  Pt presents with postural abnormalities which include: forward head and rounded shoulders         Treatment: (15 minutes 1:1)   Therapeutic Activity  TA 1: HEP and POC education      Assessment & Plan   Assessment  Mouna presents with a condition of Low complexity.   Presentation of Symptoms: Stable  Will Comorbidities Impact Care: Yes  See patient co-morbidities listed in patient past medical history above in subjective section of evaluation      Functional Limitations: Activity tolerance, Carrying objects, Pain with ADLs/IADLs, Pain when reaching, Performing household chores, Completing work/school activities  Impairments: Abnormal muscle firing, Abnormal or restricted range of motion, Activity intolerance, Impaired physical strength, Lack of appropriate home exercise program, Pain with functional activity    Patient Goal for Therapy (PT): return to  "PLOF  Prognosis: Good  Assessment Details: Patient is a 70 year old female presenting to outpatient physical therapy with diagnosed acute pain of left shoulder. Patient presents with deficits in strength, range of motion, flexibility, TTP, and posture. Patient is being limited with tolerance to functional pushing, pulling and lifting activity, especially when care-giving for family member, in relation to deficits and pain noted.      Plan  From a physical therapy perspective, the patient would benefit from: Skilled Rehab Services    Planned therapy interventions include: Therapeutic exercise, Therapeutic activities, Neuromuscular re-education, Manual therapy, ADLs/IADLs, Aquatic therapy, Canalith repositioning, Cognitive functional training, Community/work reintegration, Gait training, Group therapy, Orthotic management and training, Lymphatic compression wrapping, Prosthetic management and training, Sensory integration, Wheelchair management, Work conditioning, Work hardening, Wound care, and Other (Comment). Dry Needling  Planned modalities to include: Biofeedback, Cryotherapy (cold pack), Electrical stimulation - passive/unattended, Electrical stimulation - attended, Mechanical traction, Thermotherapy (hot pack), and Ultrasound.        Visit Frequency: 1 times Per Week for 4 Weeks.       This plan was discussed with Patient.   Discussion participants: Agreed Upon Plan of Care         The patient's spiritual, cultural, and educational needs were considered, and the patient is agreeable to the plan of care and goals.           Goals:   Active       Functional outcome       Patient will demonstrate independence in home program for support of progression       Start:  07/16/25    Expected End:  07/30/25               Lifting & carrying objects       Patient will lift 50lb bag from 16" box and carry across gym without pain onset and good observable mechanics to demonstrate safety and functional strength for " care-giving demands.        Start:  07/16/25    Expected End:  08/13/25               Range of Motion       Patient will achieve shoulder flexion AROM of 180 degrees.        Start:  07/16/25    Expected End:  08/13/25               Strength       Patient will demonstrate strength improvements to stated goals as listed in objective section of evaluation.        Start:  07/16/25    Expected End:  08/13/25                  Radha Valle PT

## 2025-07-21 ENCOUNTER — CLINICAL SUPPORT (OUTPATIENT)
Facility: HOSPITAL | Age: 70
End: 2025-07-21
Payer: MEDICARE

## 2025-07-21 DIAGNOSIS — M54.31 SCIATICA OF RIGHT SIDE: Primary | ICD-10-CM

## 2025-07-21 PROCEDURE — 97813 ACUP 1/> W/ESTIM 1ST 15 MIN: CPT | Mod: HCNC | Performed by: ACUPUNCTURIST

## 2025-07-21 PROCEDURE — 97814 ACUP 1/> W/ESTIM EA ADDL 15: CPT | Mod: HCNC | Performed by: ACUPUNCTURIST
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
## 2025-07-23 ENCOUNTER — CLINICAL SUPPORT (OUTPATIENT)
Dept: REHABILITATION | Facility: HOSPITAL | Age: 70
End: 2025-07-23
Payer: MEDICARE

## 2025-07-23 DIAGNOSIS — M25.512 ACUTE PAIN OF LEFT SHOULDER: Primary | ICD-10-CM

## 2025-07-23 PROCEDURE — 97112 NEUROMUSCULAR REEDUCATION: CPT | Mod: HCNC,PN,CQ

## 2025-07-23 PROCEDURE — 97110 THERAPEUTIC EXERCISES: CPT | Mod: HCNC,PN,CQ

## 2025-07-23 PROCEDURE — 97140 MANUAL THERAPY 1/> REGIONS: CPT | Mod: HCNC,PN,CQ
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
## 2025-07-28 ENCOUNTER — CLINICAL SUPPORT (OUTPATIENT)
Dept: REHABILITATION | Facility: HOSPITAL | Age: 70
End: 2025-07-28
Payer: MEDICARE

## 2025-07-28 DIAGNOSIS — R29.898 WEAKNESS OF LEFT UPPER EXTREMITY: Primary | ICD-10-CM

## 2025-07-28 PROCEDURE — 97112 NEUROMUSCULAR REEDUCATION: CPT | Mod: HCNC,PN

## 2025-07-28 PROCEDURE — 97110 THERAPEUTIC EXERCISES: CPT | Mod: HCNC,PN

## 2025-07-29 NOTE — PROGRESS NOTES
"  Outpatient Rehab    Physical Therapy Visit    Patient Name: Mouna Chandra  MRN: [id number]  YOB: 1955  Encounter Date: 7/28/2025    Therapy Diagnosis:   Encounter Diagnosis   Name Primary?    Weakness of left upper extremity Yes       Physician: Kacie Nguyen MD    Physician Orders: Eval and Treat  Medical Diagnosis: Acute pain of left shoulder  Surgical Diagnosis: Not applicable for this Episode   Surgical Date: Not applicable for this Episode  Days Since Last Surgery: Not applicable for this Episode    Visit # / Visits Authorized:  2 / 10  Insurance Authorization Period: 7/15/2025 to 10/10/2025  Date of Evaluation: 7/16/2025  Plan of Care Certification: 7/16/2025 to 8/13/2025        Time In: 1500   Time Out: 1600  Total Time (in minutes): 60   Total Billable Time (in minutes):  30 minutes 1:1     FOTO:  Intake Score (%): 70  Survey Score 2 (%): Not applicable for this Episode  Survey Score 3 (%): Not applicable for this Episode      Subjective   Pain has been low at rest, but does still reach higher levels with certian movements.  Pain reported as 3/10.      Objective            Treatment:      CPT Intervention  JointFocus Duration / Intensity  7/28/25 Duration / Intensity  7/23/25   TE Upper traps stretch 3x30" B 3x30" B   TE UBE 3' /3'     NMR Supine Bilateral shoulder ER Isometric 2 x 10 RTB  2x10 RTB (pain in shoulder)   NMR Shoulder ABCs 2x   2x   TE Supine shoulder flexion AAROM 2 x 10  2x10    NMR  Shoulder isometric walk out  YTB x 10 IR,ER  5" x 10    TE Doorway stretch 3x30" 10x10"     NMR  Supine SA punch 2 x 10 5" holds  2x10     NMR  Prone rows 2 x 10 each 2x10 each   NMR Prone extension 2 x 10  2x10   NMR Prone T 2x10     PLAN            Ice 10 minutes to left shoulder.     CPT Codes available for Billing:   (-) minutes of Manual therapy (MT) to improve pain and ROM.  (15) minutes of Therapeutic Exercise (TE) to develop strength, endurance, range of motion, and " "flexibility.  (15) minutes of Neuromuscular Re-Education (NMR)  to improve: Balance, Coordination, Kinesthetic, Sense, Proprioception, and Posture.  (0) minutes of Therapeutic Activities (TA) to improve functional performance.  (0) minutes of Gait Training (GT) to improve functional mobility and safety  Unattended Electrical Stimulation (ES) for muscle performance or pain modulation.  Vasopneumatic Device Therapy () for management of swelling/edema. (05473)  BFR: Blood flow restriction applied during exercise  NP or (-): Not Performed    Assessment & Plan   Assessment: Patient presents with lower grade pain except for with certain movements. Certain modifications were made during treatment due to a flare up in sciatic pain today.  Pt tolerates treatment well with this modification, allowing some progression of periscapular training for overall shoulder stability.        The patient will continue to benefit from skilled outpatient physical therapy in order to address the deficits listed in the problem list on the initial evaluation, provide patient and family education, and maximize the patients level of independence in the home and community environments.     The patient's spiritual, cultural, and educational needs were considered, and the patient is agreeable to the plan of care and goals.         Plan: Plan to progress per tolerance within current POC    Goals:   Active       Functional outcome       Patient will demonstrate independence in home program for support of progression       Start:  07/16/25    Expected End:  07/30/25               Lifting & carrying objects       Patient will lift 50lb bag from 16" box and carry across gym without pain onset and good observable mechanics to demonstrate safety and functional strength for care-giving demands.        Start:  07/16/25    Expected End:  08/13/25               Range of Motion       Patient will achieve shoulder flexion AROM of 180 degrees.        Start:  " 07/16/25    Expected End:  08/13/25               Strength       Patient will demonstrate strength improvements to stated goals as listed in objective section of evaluation.        Start:  07/16/25    Expected End:  08/13/25                Radha Valle PT

## 2025-08-04 ENCOUNTER — CLINICAL SUPPORT (OUTPATIENT)
Facility: HOSPITAL | Age: 70
End: 2025-08-04
Payer: MEDICARE

## 2025-08-04 DIAGNOSIS — M54.31 SCIATICA OF RIGHT SIDE: Primary | ICD-10-CM

## 2025-08-04 PROCEDURE — 97814 ACUP 1/> W/ESTIM EA ADDL 15: CPT | Mod: HCNC | Performed by: ACUPUNCTURIST

## 2025-08-04 PROCEDURE — 97813 ACUP 1/> W/ESTIM 1ST 15 MIN: CPT | Mod: HCNC | Performed by: ACUPUNCTURIST
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
## 2025-08-06 ENCOUNTER — CLINICAL SUPPORT (OUTPATIENT)
Dept: REHABILITATION | Facility: HOSPITAL | Age: 70
End: 2025-08-06
Payer: MEDICARE

## 2025-08-06 ENCOUNTER — TELEPHONE (OUTPATIENT)
Dept: FAMILY MEDICINE | Facility: CLINIC | Age: 70
End: 2025-08-06
Payer: MEDICARE

## 2025-08-06 DIAGNOSIS — R29.898 WEAKNESS OF LEFT SHOULDER: Primary | ICD-10-CM
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
## 2025-08-06 PROCEDURE — 97112 NEUROMUSCULAR REEDUCATION: CPT | Mod: HCNC,PN

## 2025-08-06 PROCEDURE — 97110 THERAPEUTIC EXERCISES: CPT | Mod: HCNC,PN

## 2025-08-06 RX ORDER — NAPROXEN 500 MG/1
500 TABLET ORAL 2 TIMES DAILY PRN
Qty: 60 TABLET | Refills: 0 | Status: SHIPPED | OUTPATIENT
Start: 2025-08-06

## 2025-08-06 NOTE — TELEPHONE ENCOUNTER
Copied from CRM #[id number]. Topic: Medications - Medication Refill  >> Aug 6, 2025  2:34 PM Aminta wrote:  Type:  RX Refill Request    Who Called: pharmacy  Refill or New Rx:refill  RX Name and Strength:naproxen (NAPROSYN) 500 MG [id number] sobthm1807/7/2025-NoSig - Route: Take 1 tablet (500 mg total) by mouth 2 (two) times daily as needed. - OralSent to pharmacy as: naproxen (NAPROSYN) 500 MG tabletClass: NormalOrder: [id number]/Time Signed: 7/7/2025 09:36E-Prescribing Status: Receipt confirmed by pharmacy (7/7/2025  9:37 AM CDT)     Preferred Pharmacy with phone number:  [id number] - [facility] [address] [address] AT [facility] [address] [place] [address] [address] [address]    Local or Mail Order:local  Ordering Provider:na  Would the patient rather a call back or a response via [facility]? call  Best Call Back Number:[phone]  Additional Information: requesting refill

## 2025-08-06 NOTE — PROGRESS NOTES
"  Outpatient Rehab    Physical Therapy Visit    Patient Name: Mouna Chandra  MRN: [id number]  YOB: 1955  Encounter Date: 8/6/2025    Therapy Diagnosis:   No diagnosis found.      Physician: Kacie Nguyen MD    Physician Orders: Eval and Treat  Medical Diagnosis: Acute pain of left shoulder  Surgical Diagnosis: Not applicable for this Episode   Surgical Date: Not applicable for this Episode  Days Since Last Surgery: Not applicable for this Episode    Visit # / Visits Authorized:  3 / 10  Insurance Authorization Period: 7/15/2025 to 10/10/2025  Date of Evaluation: 7/16/2025  Plan of Care Certification: 7/16/2025 to 8/13/2025        Time In: 1515   Time Out: 1610  Total Time (in minutes): 55   Total Billable Time (in minutes):  55 minutes 1:1     FOTO:  Intake Score (%): 70  Survey Score 2 (%): Not applicable for this Episode  Survey Score 3 (%): Not applicable for this Episode      Subjective   Patient reports her pain has been much better over the past few days..  Pain reported as 0/10.      Objective            Treatment:      CPT Intervention  [facility] Duration / Intensity  8/6/25 Duration / Intensity  7/28/25 Duration / Intensity  7/23/25   TE UBE 3'/3' 3' /3'     NMR Bilateral shoulder ER 3 x 10 GTB  Isometric 2 x 10 RTB  2x10 RTB (pain in shoulder)   NMR Shoulder ABCs 2x 2x   2x   NMR Rows  20 # 2 x 10        TA 's carry  5# 2 laps on turf       NMR  Shoulder isometric walk out  YTB x 15 IR, ER YTB x 10 IR,ER  5" x 10   TE SL shoulder ER  3  x 8 2#        NMR  Supine SA punch   2 x 10 5" holds  2x10     NMR  Prone rows 2 x 10 2# 2 x 10 each 2x10 each   NMR Prone extension 2 x 12  2 x 10  2x10   NMR Prone T 2 x 12  2x10     PLAN              Ice 10 minutes to left shoulder.     CPT Codes available for Billing:   (-) minutes of Manual therapy (MT) to improve pain and ROM.  (8) minutes of Therapeutic Exercise (TE) to develop strength, endurance, range of motion, and " "flexibility.  (43) minutes of Neuromuscular Re-Education (NMR)  to improve: Balance, Coordination, Kinesthetic, Sense, Proprioception, and Posture.  (4) minutes of Therapeutic Activities (TA) to improve functional performance.  (0) minutes of Gait Training (GT) to improve functional mobility and safety  Unattended Electrical Stimulation (ES) for muscle performance or pain modulation.  Vasopneumatic Device Therapy () for management of swelling/edema. (19967)  BFR: Blood flow restriction applied during exercise  NP or (-): Not Performed    Assessment & Plan   Assessment: Progressions were made to strength, and neuromuscular reeducation training today, along with introduction of functional activity training as symptoms improve. Pt tolerates this well with no onset of pain, and minimal cueing needed for form maintenance throughout.       The patient will continue to benefit from skilled outpatient physical therapy in order to address the deficits listed in the problem list on the initial evaluation, provide patient and family education, and maximize the patients level of independence in the home and community environments.     The patient's spiritual, cultural, and educational needs were considered, and the patient is agreeable to the plan of care and goals.         Plan: Plan to progress per tolerance within current POC    Goals:   Active       Functional outcome       Patient will demonstrate independence in home program for support of progression       Start:  07/16/25    Expected End:  07/30/25               Lifting & carrying objects       Patient will lift 50lb bag from 16" box and carry across gym without pain onset and good observable mechanics to demonstrate safety and functional strength for care-giving demands.        Start:  07/16/25    Expected End:  08/13/25               Range of Motion       Patient will achieve shoulder flexion AROM of 180 degrees.        Start:  07/16/25    Expected End:  08/13/25   "             Strength       Patient will demonstrate strength improvements to stated goals as listed in objective section of evaluation.        Start:  07/16/25    Expected End:  08/13/25                Radha Valle PT

## 2025-08-13 DIAGNOSIS — M54.30 SCIATIC LEG PAIN: ICD-10-CM

## 2025-08-13 RX ORDER — GABAPENTIN 100 MG/1
CAPSULE ORAL
Qty: 90 CAPSULE | Refills: 3 | Status: SHIPPED | OUTPATIENT
Start: 2025-08-13

## (undated) DEVICE — DRAPE HAND STERILE

## (undated) DEVICE — GOWN NONREINF SET-IN SLV 2XL

## (undated) DEVICE — SCRUB DYNA-HEX LIQ 4% CHG 4OZ

## (undated) DEVICE — TOURNIQUET SB QC DP 18X4IN

## (undated) DEVICE — COVER CAMERA OPERATING ROOM

## (undated) DEVICE — PAD CAST SPECIALIST STRL 3

## (undated) DEVICE — UNDERGLOVES BIOGEL PI SZ 7 LF

## (undated) DEVICE — GAUZE SPONGE 4X4 12PLY

## (undated) DEVICE — UNDERGLOVES BIOGEL PI SIZE 8.5

## (undated) DEVICE — POSITIONER HEAD DONUT 9IN FOAM

## (undated) DEVICE — BANDAGE ESMARK ELASTIC ST 4X9

## (undated) DEVICE — GOWN POLY REINF BRTH SLV XL

## (undated) DEVICE — SUT 4-0 ETHILON 18 PS-2

## (undated) DEVICE — DRAPE U SPLIT SHEET 54X76IN

## (undated) DEVICE — SUT VICRYL 4-0 18 P-3

## (undated) DEVICE — DRESSING XEROFORM FOIL PK 1X8

## (undated) DEVICE — COVER LIGHT HANDLE 80/CA

## (undated) DEVICE — PACK BASIC SETUP SC BR

## (undated) DEVICE — SYR 10CC LUER LOCK

## (undated) DEVICE — NDL SAFETY 25G X 1.5 ECLIPSE

## (undated) DEVICE — SEE L#120831

## (undated) DEVICE — GLOVE BIOGEL SZ 8 1/2

## (undated) DEVICE — DRAPE THREE-QTR REINF 53X77IN

## (undated) DEVICE — GLOVE SURGICAL LATEX SZ 7

## (undated) DEVICE — SUT VICRYL 3-0 27 RB-1

## (undated) DEVICE — SUPPORT ULNA NERVE PROTECTOR

## (undated) DEVICE — ALCOHOL 70% ISOP RUBBING 4OZ

## (undated) DEVICE — UNDERGLOVES BIOGEL PI SIZE 7.5

## (undated) DEVICE — PAD ABD 8X10 STERILE

## (undated) DEVICE — APPLICATOR CHLORAPREP ORN 26ML

## (undated) DEVICE — TOWEL OR DISP STRL BLUE 4/PK